# Patient Record
Sex: FEMALE | Race: WHITE | NOT HISPANIC OR LATINO | Employment: FULL TIME | URBAN - METROPOLITAN AREA
[De-identification: names, ages, dates, MRNs, and addresses within clinical notes are randomized per-mention and may not be internally consistent; named-entity substitution may affect disease eponyms.]

---

## 2017-01-18 ENCOUNTER — CONVERSION ENCOUNTER (OUTPATIENT)
Dept: FAMILY MEDICINE CLINIC | Facility: CLINIC | Age: 42
End: 2017-01-18

## 2017-06-16 ENCOUNTER — CONVERSION ENCOUNTER (OUTPATIENT)
Dept: FAMILY MEDICINE CLINIC | Facility: CLINIC | Age: 42
End: 2017-06-16

## 2017-07-18 ENCOUNTER — HOSPITAL ENCOUNTER (OUTPATIENT)
Dept: CARDIOLOGY | Facility: HOSPITAL | Age: 42
Discharge: HOME | End: 2017-07-18
Attending: FAMILY MEDICINE | Admitting: FAMILY MEDICINE

## 2018-03-22 ENCOUNTER — CONVERSION ENCOUNTER (OUTPATIENT)
Dept: FAMILY MEDICINE CLINIC | Facility: CLINIC | Age: 43
End: 2018-03-22

## 2018-03-30 ENCOUNTER — HOSPITAL ENCOUNTER (OUTPATIENT)
Dept: MAMMOGRAPHY | Facility: HOSPITAL | Age: 43
Discharge: HOME | End: 2018-03-30
Attending: FAMILY MEDICINE | Admitting: FAMILY MEDICINE

## 2018-04-05 ENCOUNTER — CONVERSION ENCOUNTER (OUTPATIENT)
Dept: FAMILY MEDICINE CLINIC | Facility: CLINIC | Age: 43
End: 2018-04-05

## 2018-04-06 ENCOUNTER — HOSPITAL ENCOUNTER (OUTPATIENT)
Dept: FAMILY MEDICINE CLINIC | Facility: CLINIC | Age: 43
Setting detail: SPECIMEN
Discharge: HOME | End: 2018-04-06
Attending: FAMILY MEDICINE | Admitting: FAMILY MEDICINE

## 2018-06-06 ENCOUNTER — HOSPITAL ENCOUNTER (OUTPATIENT)
Dept: LAB | Facility: HOSPITAL | Age: 43
Discharge: HOME | End: 2018-06-06
Attending: FAMILY MEDICINE | Admitting: FAMILY MEDICINE

## 2018-06-06 ENCOUNTER — CONVERSION ENCOUNTER (OUTPATIENT)
Dept: FAMILY MEDICINE CLINIC | Facility: CLINIC | Age: 43
End: 2018-06-06

## 2018-06-06 LAB — D DIMER PPP FEU-MCNC: 0.25 MG/L FEU (ref 0.17–0.59)

## 2019-02-06 ENCOUNTER — CONVERSION ENCOUNTER (OUTPATIENT)
Dept: FAMILY MEDICINE CLINIC | Facility: CLINIC | Age: 44
End: 2019-02-06

## 2019-06-03 VITALS
OXYGEN SATURATION: 97 % | DIASTOLIC BLOOD PRESSURE: 82 MMHG | BODY MASS INDEX: 28.88 KG/M2 | DIASTOLIC BLOOD PRESSURE: 79 MMHG | DIASTOLIC BLOOD PRESSURE: 87 MMHG | HEART RATE: 86 BPM | WEIGHT: 160 LBS | SYSTOLIC BLOOD PRESSURE: 125 MMHG | HEART RATE: 69 BPM | OXYGEN SATURATION: 100 % | WEIGHT: 159 LBS | HEIGHT: 63 IN | HEART RATE: 68 BPM | OXYGEN SATURATION: 100 % | WEIGHT: 165 LBS | WEIGHT: 162.13 LBS | DIASTOLIC BLOOD PRESSURE: 76 MMHG | HEART RATE: 77 BPM | BODY MASS INDEX: 28.35 KG/M2 | HEIGHT: 63 IN | HEIGHT: 63 IN | BODY MASS INDEX: 29.23 KG/M2 | DIASTOLIC BLOOD PRESSURE: 78 MMHG | OXYGEN SATURATION: 100 % | DIASTOLIC BLOOD PRESSURE: 91 MMHG | SYSTOLIC BLOOD PRESSURE: 130 MMHG | HEART RATE: 70 BPM | SYSTOLIC BLOOD PRESSURE: 123 MMHG | OXYGEN SATURATION: 99 % | SYSTOLIC BLOOD PRESSURE: 145 MMHG | SYSTOLIC BLOOD PRESSURE: 112 MMHG | WEIGHT: 163 LBS | SYSTOLIC BLOOD PRESSURE: 149 MMHG | HEIGHT: 63 IN | BODY MASS INDEX: 29.41 KG/M2 | WEIGHT: 166 LBS | HEART RATE: 93 BPM | OXYGEN SATURATION: 97 %

## 2019-08-14 ENCOUNTER — OFFICE VISIT (OUTPATIENT)
Dept: FAMILY MEDICINE CLINIC | Facility: CLINIC | Age: 44
End: 2019-08-14

## 2019-08-14 VITALS
SYSTOLIC BLOOD PRESSURE: 159 MMHG | HEIGHT: 63 IN | BODY MASS INDEX: 28.17 KG/M2 | OXYGEN SATURATION: 99 % | WEIGHT: 159 LBS | DIASTOLIC BLOOD PRESSURE: 112 MMHG | HEART RATE: 70 BPM

## 2019-08-14 DIAGNOSIS — R12 HEARTBURN: ICD-10-CM

## 2019-08-14 DIAGNOSIS — IMO0002 MASS: Primary | ICD-10-CM

## 2019-08-14 DIAGNOSIS — R07.9 CHEST PAIN, UNSPECIFIED TYPE: ICD-10-CM

## 2019-08-14 DIAGNOSIS — R21 RASH: ICD-10-CM

## 2019-08-14 PROBLEM — R87.613 PAPANICOLAOU SMEAR OF CERVIX WITH HIGH GRADE SQUAMOUS INTRAEPITHELIAL LESION (HGSIL): Status: ACTIVE | Noted: 2018-04-16

## 2019-08-14 PROBLEM — R60.0 EDEMA LEG: Status: ACTIVE | Noted: 2018-06-06

## 2019-08-14 PROBLEM — R42 DIZZINESS: Status: ACTIVE | Noted: 2018-03-22

## 2019-08-14 PROBLEM — M62.81 MUSCLE WEAKNESS OF LOWER EXTREMITY: Status: ACTIVE | Noted: 2017-06-16

## 2019-08-14 PROBLEM — N39.46 URINARY INCONTINENCE, MIXED: Status: ACTIVE | Noted: 2017-06-16

## 2019-08-14 PROBLEM — Z72.0 TOBACCO USE: Status: ACTIVE | Noted: 2017-06-16

## 2019-08-14 PROBLEM — R06.09 DYSPNEA ON EXERTION: Status: ACTIVE | Noted: 2017-06-16

## 2019-08-14 PROCEDURE — 99214 OFFICE O/P EST MOD 30 MIN: CPT | Performed by: NURSE PRACTITIONER

## 2019-08-14 RX ORDER — SULFAMETHOXAZOLE AND TRIMETHOPRIM 800; 160 MG/1; MG/1
1 TABLET ORAL 2 TIMES DAILY
Qty: 20 TABLET | Refills: 0 | Status: SHIPPED | OUTPATIENT
Start: 2019-08-14 | End: 2019-08-24

## 2019-08-14 RX ORDER — OMEPRAZOLE 40 MG/1
40 CAPSULE, DELAYED RELEASE ORAL DAILY
Qty: 30 CAPSULE | Refills: 0 | Status: SHIPPED | OUTPATIENT
Start: 2019-08-14 | End: 2019-09-08 | Stop reason: SDUPTHER

## 2019-08-14 NOTE — PATIENT INSTRUCTIONS
Finish antibiotic  Call if cyst does not go down   Apply OTC hydrocortisone cream to hands for rash  Complete echo  Start omeprazole  Avoid overeating, eat small portions at meals and snacks. Avoid chewing gum, chocolate, caffeine, spicy foods, alcohol, coffee, peppermint, and acidic foods.  Elevate head of bed. Avoid eating within 2 hours of bedtime.  Sit up for 30 minutes after eating meals.

## 2019-08-14 NOTE — PROGRESS NOTES
"Subjective   Eleanor Bonds is a 43 y.o. female.     Pt is here today with c/o of a nodule on her left back and rash on both hands that is itching. The nodule appeared about 1 month ago.  It started small and she initially thought it was a pimple.  It has since increased and is causing pain.  An pressure or movement affects it.  She does not recall any insect bites in that area. Denies any fever, chills, fatigue.  She also has has some itchy bumps on her hand for the last couple of days.  She had been working in the yard.  Patient also reports that she has been having increased chest pain at times.  She is also experiencing burning that is going up into her throat.  She has never been told that she has acid reflux in the past.  She does have a history of cardiomyopathy and states that she typically has an echo done completed yearly but has not had one in a couple of years.         The following portions of the patient's history were reviewed and updated as appropriate: allergies, current medications, past family history, past medical history, past social history, past surgical history and problem list.    Review of Systems   Constitutional: Negative for chills, fatigue and fever.   Eyes: Negative for blurred vision and double vision.   Respiratory: Negative for chest tightness and shortness of breath.    Cardiovascular: Positive for chest pain. Negative for palpitations.   Gastrointestinal: Positive for indigestion.   Musculoskeletal: Negative for myalgias.   Skin: Positive for rash and skin lesions.   Neurological: Negative for dizziness and headache.   Psychiatric/Behavioral: Negative for depressed mood.       Objective   BP (!) 159/112 (BP Location: Right arm, Patient Position: Sitting, Cuff Size: Adult)   Pulse 70   Ht 160 cm (63\")   Wt 72.1 kg (159 lb)   SpO2 99%   BMI 28.17 kg/m²   Physical Exam   Constitutional: She is oriented to person, place, and time. She appears well-developed and well-nourished. "   HENT:   Head: Normocephalic and atraumatic.   Neck: Normal range of motion. Neck supple.   Cardiovascular: Normal rate and regular rhythm.   Pulmonary/Chest: Effort normal and breath sounds normal.   Abdominal: Soft. Bowel sounds are normal.   Neurological: She is oriented to person, place, and time.   Skin: Skin is warm and dry. There is erythema.   3 to 4 cm Erithmatic mass on the left upper back  Small papule rash on fingers   Psychiatric: She has a normal mood and affect. Her behavior is normal.         Assessment/Plan   Problems Addressed this Visit     None      Visit Diagnoses     Mass    -  Primary    Appears to be an infected sebaceous cyst  We will treat with antibiotic  Call if symptoms do not improve    Relevant Medications    sulfamethoxazole-trimethoprim (BACTRIM DS) 800-160 MG per tablet    Rash        Papular rash of unknown origin  Use hydrocortisone cream  Call if symptoms not improving    Heartburn        Start omeprazole  GERD precautions given      Relevant Medications    omeprazole (priLOSEC) 40 MG capsule    Chest pain, unspecified type        Obtain echo due to history of cardiomyopathy    Relevant Orders    Adult Transthoracic Echo Complete W/ Cont if Necessary Per Protocol              Diagnoses and all orders for this visit:    1. Mass (Primary)  Comments:  Appears to be an infected sebaceous cyst  We will treat with antibiotic  Call if symptoms do not improve  Orders:  -     sulfamethoxazole-trimethoprim (BACTRIM DS) 800-160 MG per tablet; Take 1 tablet by mouth 2 (Two) Times a Day for 10 days.  Dispense: 20 tablet; Refill: 0    2. Rash  Comments:  Papular rash of unknown origin  Use hydrocortisone cream  Call if symptoms not improving    3. Heartburn  Comments:  Start omeprazole  GERD precautions given    Orders:  -     omeprazole (priLOSEC) 40 MG capsule; Take 1 capsule by mouth Daily.  Dispense: 30 capsule; Refill: 0    4. Chest pain, unspecified type  Comments:  Obtain echo due to  history of cardiomyopathy  Orders:  -     Adult Transthoracic Echo Complete W/ Cont if Necessary Per Protocol; Future

## 2019-08-23 ENCOUNTER — OFFICE VISIT (OUTPATIENT)
Dept: FAMILY MEDICINE CLINIC | Facility: CLINIC | Age: 44
End: 2019-08-23

## 2019-08-23 VITALS
TEMPERATURE: 97.9 F | BODY MASS INDEX: 29.59 KG/M2 | SYSTOLIC BLOOD PRESSURE: 133 MMHG | DIASTOLIC BLOOD PRESSURE: 94 MMHG | OXYGEN SATURATION: 99 % | WEIGHT: 167 LBS | HEART RATE: 74 BPM | HEIGHT: 63 IN

## 2019-08-23 DIAGNOSIS — IMO0002 MASS: Primary | ICD-10-CM

## 2019-08-23 PROCEDURE — 99213 OFFICE O/P EST LOW 20 MIN: CPT | Performed by: NURSE PRACTITIONER

## 2019-08-23 PROCEDURE — 87205 SMEAR GRAM STAIN: CPT | Performed by: NURSE PRACTITIONER

## 2019-08-23 PROCEDURE — 87070 CULTURE OTHR SPECIMN AEROBIC: CPT | Performed by: NURSE PRACTITIONER

## 2019-08-23 RX ORDER — TRAMADOL HYDROCHLORIDE 50 MG/1
50 TABLET ORAL EVERY 6 HOURS PRN
Qty: 10 TABLET | Refills: 0 | Status: SHIPPED | OUTPATIENT
Start: 2019-08-23 | End: 2019-08-24

## 2019-08-23 RX ORDER — SULFAMETHOXAZOLE AND TRIMETHOPRIM 800; 160 MG/1; MG/1
1 TABLET ORAL 2 TIMES DAILY
Qty: 20 TABLET | Refills: 0 | Status: SHIPPED | OUTPATIENT
Start: 2019-08-23 | End: 2019-08-29

## 2019-08-23 NOTE — PATIENT INSTRUCTIONS
Keep area clean and dry  Pull 1/2 inch of packing out each day.  Follow up early next week for reevaluation  Watch for signs of infection- increased pain, redness, swelling, fever  Use a clear tegaderm dressing when showering  Complete antibiotic  Tramadol as needed for pain

## 2019-08-23 NOTE — PROGRESS NOTES
"Subjective   Eleanor Bonds is a 43 y.o. female.     Pt is here today with a mass on her left upper back that appears to be an infected sebaceous cyst or boil.  She was seen last week and was treated with bactrim.  She has her last dose today.  She reports that it is getting more inflamed and more painful. She has not ran any fevers or had body aches.  Denies any itching          The following portions of the patient's history were reviewed and updated as appropriate: allergies, current medications, past family history, past medical history, past social history, past surgical history and problem list.    Review of Systems   Constitutional: Negative for chills and fever.   Respiratory: Negative for chest tightness and shortness of breath.    Cardiovascular: Positive for chest pain.   Skin: Positive for skin lesions.       Objective   /94 (BP Location: Right arm, Patient Position: Sitting, Cuff Size: Large Adult)   Pulse 74   Temp 97.9 °F (36.6 °C) (Oral)   Ht 160 cm (63\")   Wt 75.8 kg (167 lb)   SpO2 99%   BMI 29.58 kg/m²   Physical Exam   Constitutional: She is oriented to person, place, and time. She appears well-developed and well-nourished.   Cardiovascular: Normal rate and regular rhythm.   Pulmonary/Chest: Effort normal and breath sounds normal.   Musculoskeletal: Normal range of motion.   Neurological: She is alert and oriented to person, place, and time.   Skin: There is erythema.   Erythematic 3-4cm mass on left upper back.   Psychiatric: She has a normal mood and affect. Her behavior is normal. Judgment and thought content normal.         Assessment/Plan   Problems Addressed this Visit     None      Visit Diagnoses     Mass    -  Primary    infected sebaceous cyst vs boil  I&D and wound culture  purulent drainage.  packed with iodoform.  Return on Monday for reeval  10 more days bactrim    Relevant Medications    traMADol (ULTRAM) 50 MG tablet    sulfamethoxazole-trimethoprim (BACTRIM DS) " 800-160 MG per tablet    Other Relevant Orders    Wound Culture - Wound, Back, Upper (Completed)              Diagnoses and all orders for this visit:    1. Mass (Primary)  Comments:  infected sebaceous cyst vs boil  I&D and wound culture  purulent drainage.  packed with iodoform.  Return on Monday for reeval  10 more days bactrim  Orders:  -     traMADol (ULTRAM) 50 MG tablet; Take 1 tablet by mouth Every 6 (Six) Hours As Needed for Moderate Pain .  Dispense: 10 tablet; Refill: 0  -     sulfamethoxazole-trimethoprim (BACTRIM DS) 800-160 MG per tablet; Take 1 tablet by mouth 2 (Two) Times a Day for 10 days.  Dispense: 20 tablet; Refill: 0  -     Wound Culture - Wound, Back, Upper; Future  -     Wound Culture - Wound, Back, Upper      Incision & Drainage  Date/Time: 8/24/2019 9:06 AM  Performed by: Lizz Moraes APRN  Authorized by: Lizz Moraes APRN   Body area: trunk  Location details: back  Anesthesia: local infiltration    Anesthesia:  Local Anesthetic: lidocaine 1% with epinephrine    Sedation:  Patient sedated: no    Scalpel size: 15  Needle gauge: 22  Incision type: single straight  Drainage: purulent and  bloody  Wound treatment: wound left open  Packing material: 1/2 in iodoform gauze  Patient tolerance: Patient tolerated the procedure well with no immediate complications        Keep area clean and dry  Pull 1/2 inch of packing out each day.  Follow up early next week for reevaluation  Watch for signs of infection- increased pain, redness, swelling, fever  Use a clear tegaderm dressing when showering  Complete antibiotic  Tramadol as needed for pain

## 2019-08-24 RX ORDER — TRAMADOL HYDROCHLORIDE 50 MG/1
50 TABLET ORAL EVERY 6 HOURS PRN
Qty: 10 TABLET | Refills: 0 | Status: SHIPPED | OUTPATIENT
Start: 2019-08-24 | End: 2020-01-29

## 2019-08-27 ENCOUNTER — OFFICE VISIT (OUTPATIENT)
Dept: FAMILY MEDICINE CLINIC | Facility: CLINIC | Age: 44
End: 2019-08-27

## 2019-08-27 VITALS
HEART RATE: 72 BPM | TEMPERATURE: 98 F | BODY MASS INDEX: 29.59 KG/M2 | HEIGHT: 63 IN | SYSTOLIC BLOOD PRESSURE: 114 MMHG | DIASTOLIC BLOOD PRESSURE: 78 MMHG | OXYGEN SATURATION: 98 % | WEIGHT: 167 LBS

## 2019-08-27 DIAGNOSIS — IMO0002 MASS: Primary | ICD-10-CM

## 2019-08-27 LAB
BACTERIA SPEC AEROBE CULT: ABNORMAL
GRAM STN SPEC: ABNORMAL
GRAM STN SPEC: ABNORMAL

## 2019-08-27 PROCEDURE — 99212 OFFICE O/P EST SF 10 MIN: CPT | Performed by: NURSE PRACTITIONER

## 2019-08-27 NOTE — PROGRESS NOTES
"Subjective   Eleanor Bonds is a 43 y.o. female.     Patient is here today for reevaluation of her I&D that was done late last week to a mass on her left upper back.  Packing was to be removed and dressing changed.  Patient denies any fevers chills.  She has been changing the dressing every day.  No complaints at this time other than the incision itching some.         The following portions of the patient's history were reviewed and updated as appropriate: allergies, current medications, past family history, past medical history, past social history, past surgical history and problem list.    Review of Systems   Constitutional: Negative for chills, fatigue and fever.   Respiratory: Negative for cough.    Gastrointestinal: Negative for abdominal pain.   Skin: Positive for skin lesions.   Neurological: Negative for dizziness and headache.       Objective   /78 (BP Location: Right arm, Patient Position: Sitting, Cuff Size: Large Adult)   Pulse 72   Temp 98 °F (36.7 °C) (Oral)   Ht 160 cm (63\")   Wt 75.8 kg (167 lb)   SpO2 98%   BMI 29.58 kg/m²   Physical Exam   Constitutional: She is oriented to person, place, and time. She appears well-developed and well-nourished. No distress.   Cardiovascular: Normal rate and regular rhythm.   Pulmonary/Chest: Effort normal and breath sounds normal. No respiratory distress.   Neurological: She is alert and oriented to person, place, and time.   Skin:        Packing was removed from the left upper back I&D site.  Purulent drainage noted on packing.  Surrounding erythema resolved.  Wound seems to be healing well.  Reapplied packing and coverderm.  Advised patient to come back later this week for dressing change and reevaluation.   Psychiatric: She has a normal mood and affect. Her behavior is normal. Judgment and thought content normal.         Assessment/Plan   Problems Addressed this Visit     None      Visit Diagnoses     Mass    -  Primary    Healing well  Packing " removed and repacked  No surrounding erythema  Continue to change external dressing daily  Come back later this week for packing removal                Diagnoses and all orders for this visit:    1. Mass (Primary)  Comments:  Healing well  Packing removed and repacked  No surrounding erythema  Continue to change external dressing daily  Come back later this week for packing removal        Area cleaned with Betadine.  Wound packed with half-inch iodoform.  Cover Derm applied over wound

## 2019-08-29 ENCOUNTER — OFFICE VISIT (OUTPATIENT)
Dept: FAMILY MEDICINE CLINIC | Facility: CLINIC | Age: 44
End: 2019-08-29

## 2019-08-29 VITALS
OXYGEN SATURATION: 100 % | SYSTOLIC BLOOD PRESSURE: 137 MMHG | WEIGHT: 176 LBS | HEIGHT: 63 IN | HEART RATE: 88 BPM | BODY MASS INDEX: 31.18 KG/M2 | DIASTOLIC BLOOD PRESSURE: 82 MMHG

## 2019-08-29 DIAGNOSIS — IMO0002 MASS: Primary | ICD-10-CM

## 2019-08-29 PROCEDURE — 99212 OFFICE O/P EST SF 10 MIN: CPT | Performed by: NURSE PRACTITIONER

## 2019-08-29 NOTE — PROGRESS NOTES
"Subjective   Eleanor Bonds is a 43 y.o. female.     Pt is here today for her second follow up on a boil that had been lanced and packed.  Pt's wound culture came back indicating normal bacteria so she was advised to stop taking her antibiotic.  She is doing well and denies any fever or chills. She reports that the wound has been itchy.  She has been changing her dressing daily.  Denies any surrounding redness    Pt reports that her heartburn is doing better since she started taking omeprazole.  She is having a echo completed next week to monitor her cardiomyopathy.         The following portions of the patient's history were reviewed and updated as appropriate: allergies, current medications, past family history, past medical history, past social history, past surgical history and problem list.    Review of Systems   Constitutional: Negative for chills, fatigue and fever.   Respiratory: Positive for shortness of breath (with exertion). Negative for chest tightness.    Cardiovascular: Negative for chest pain and palpitations.   Gastrointestinal: Negative for diarrhea, nausea and vomiting.   Musculoskeletal: Negative for myalgias.   Neurological: Negative for dizziness and headache.       Objective   /82 (BP Location: Left arm, Patient Position: Sitting, Cuff Size: Adult)   Pulse 88   Ht 160 cm (63\")   Wt 79.8 kg (176 lb)   SpO2 100%   BMI 31.18 kg/m²   Physical Exam   Constitutional: She is oriented to person, place, and time. She appears well-developed and well-nourished. No distress.   Cardiovascular: Normal rate and regular rhythm.   Pulmonary/Chest: Effort normal and breath sounds normal.   Abdominal: Soft.   Neurological: She is alert and oriented to person, place, and time.   Skin: No erythema.        Packing was removed from the left upper back I&D site.  Purulent drainage noted on packing.  No surrounding erythema.  Wound seems to be healing well. Flushed with normal saline  Reapplied packing " and coverderm.  Advised patient to remove packing on Sunday and to cont to place guaze over site.     Psychiatric: She has a normal mood and affect. Her behavior is normal. Judgment and thought content normal.         Assessment/Plan   Problems Addressed this Visit     None      Visit Diagnoses     Mass    -  Primary    healing well  packing removed  cleaned with normal saline  reaaplied 1/2 iodoform guaze  pt to remove on sunday morn  apply dry dressing  call for signs of inf              Diagnoses and all orders for this visit:    1. Mass (Primary)  Comments:  healing well  packing removed  cleaned with normal saline  reaaplied 1/2 iodoform guaze  pt to remove on sunday morn  apply dry dressing  call for signs of inf

## 2019-08-29 NOTE — PATIENT INSTRUCTIONS
Ok to cancel appointment next week  Remove packing on Sunday  Apply dry dressing daily  Call for any signs of infection- redness, purulent drainage, fever

## 2019-09-04 ENCOUNTER — HOSPITAL ENCOUNTER (OUTPATIENT)
Dept: CARDIOLOGY | Facility: HOSPITAL | Age: 44
Discharge: HOME | End: 2019-09-04
Admitting: NURSE PRACTITIONER

## 2019-09-04 VITALS
HEIGHT: 63 IN | BODY MASS INDEX: 31.36 KG/M2 | SYSTOLIC BLOOD PRESSURE: 146 MMHG | DIASTOLIC BLOOD PRESSURE: 84 MMHG | WEIGHT: 177 LBS

## 2019-09-04 DIAGNOSIS — R07.9 CHEST PAIN, UNSPECIFIED TYPE: ICD-10-CM

## 2019-09-04 LAB
BH CV ECHO MEAS - % IVS THICK: 53.3 %
BH CV ECHO MEAS - % LVPW THICK: 61 %
BH CV ECHO MEAS - ACS: 2 CM
BH CV ECHO MEAS - AO MAX PG (FULL): 2.6 MMHG
BH CV ECHO MEAS - AO MAX PG: 5.9 MMHG
BH CV ECHO MEAS - AO MEAN PG (FULL): 0.99 MMHG
BH CV ECHO MEAS - AO MEAN PG: 2.8 MMHG
BH CV ECHO MEAS - AO ROOT AREA (BSA CORRECTED): 1.7
BH CV ECHO MEAS - AO ROOT AREA: 7.4 CM^2
BH CV ECHO MEAS - AO ROOT DIAM: 3.1 CM
BH CV ECHO MEAS - AO V2 MAX: 121.5 CM/SEC
BH CV ECHO MEAS - AO V2 MEAN: 77.9 CM/SEC
BH CV ECHO MEAS - AO V2 VTI: 24 CM
BH CV ECHO MEAS - ASC AORTA: 2.6 CM
BH CV ECHO MEAS - AVA(I,A): 2.5 CM^2
BH CV ECHO MEAS - AVA(I,D): 2.5 CM^2
BH CV ECHO MEAS - AVA(V,A): 2.1 CM^2
BH CV ECHO MEAS - AVA(V,D): 2.1 CM^2
BH CV ECHO MEAS - BSA(HAYCOCK): 1.9 M^2
BH CV ECHO MEAS - BSA: 1.8 M^2
BH CV ECHO MEAS - BZI_BMI: 31.4 KILOGRAMS/M^2
BH CV ECHO MEAS - BZI_METRIC_HEIGHT: 160 CM
BH CV ECHO MEAS - BZI_METRIC_WEIGHT: 80.3 KG
BH CV ECHO MEAS - EDV(CUBED): 202.6 ML
BH CV ECHO MEAS - EDV(MOD-SP2): 71 ML
BH CV ECHO MEAS - EDV(MOD-SP4): 90.3 ML
BH CV ECHO MEAS - EDV(TEICH): 171.4 ML
BH CV ECHO MEAS - EF(CUBED): 67.4 %
BH CV ECHO MEAS - EF(MOD-BP): 55 %
BH CV ECHO MEAS - EF(MOD-SP2): 48.8 %
BH CV ECHO MEAS - EF(MOD-SP4): 61.9 %
BH CV ECHO MEAS - EF(TEICH): 58.1 %
BH CV ECHO MEAS - ESV(CUBED): 66.1 ML
BH CV ECHO MEAS - ESV(MOD-SP2): 36.3 ML
BH CV ECHO MEAS - ESV(MOD-SP4): 34.4 ML
BH CV ECHO MEAS - ESV(TEICH): 71.8 ML
BH CV ECHO MEAS - FS: 31.1 %
BH CV ECHO MEAS - IVS/LVPW: 0.96
BH CV ECHO MEAS - IVSD: 0.83 CM
BH CV ECHO MEAS - IVSS: 1.3 CM
BH CV ECHO MEAS - LA DIMENSION(2D): 4 CM
BH CV ECHO MEAS - LV DIASTOLIC VOL/BSA (35-75): 49.2 ML/M^2
BH CV ECHO MEAS - LV MASS(C)D: 192.1 GRAMS
BH CV ECHO MEAS - LV MASS(C)DI: 104.6 GRAMS/M^2
BH CV ECHO MEAS - LV MASS(C)S: 196.1 GRAMS
BH CV ECHO MEAS - LV MASS(C)SI: 106.8 GRAMS/M^2
BH CV ECHO MEAS - LV MAX PG: 3.3 MMHG
BH CV ECHO MEAS - LV MEAN PG: 1.8 MMHG
BH CV ECHO MEAS - LV SYSTOLIC VOL/BSA (12-30): 18.7 ML/M^2
BH CV ECHO MEAS - LV V1 MAX: 90.6 CM/SEC
BH CV ECHO MEAS - LV V1 MEAN: 64.6 CM/SEC
BH CV ECHO MEAS - LV V1 VTI: 21.2 CM
BH CV ECHO MEAS - LVIDD: 5.9 CM
BH CV ECHO MEAS - LVIDS: 4 CM
BH CV ECHO MEAS - LVOT AREA: 2.8 CM^2
BH CV ECHO MEAS - LVOT DIAM: 1.9 CM
BH CV ECHO MEAS - LVPWD: 0.86 CM
BH CV ECHO MEAS - LVPWS: 1.4 CM
BH CV ECHO MEAS - MV A MAX VEL: 55.6 CM/SEC
BH CV ECHO MEAS - MV DEC SLOPE: 500.8 CM/SEC^2
BH CV ECHO MEAS - MV DEC TIME: 0.15 SEC
BH CV ECHO MEAS - MV E MAX VEL: 72.9 CM/SEC
BH CV ECHO MEAS - MV E/A: 1.3
BH CV ECHO MEAS - MV MAX PG: 3.5 MMHG
BH CV ECHO MEAS - MV MEAN PG: 1.2 MMHG
BH CV ECHO MEAS - MV V2 MAX: 94.2 CM/SEC
BH CV ECHO MEAS - MV V2 MEAN: 50.1 CM/SEC
BH CV ECHO MEAS - MV V2 VTI: 28.6 CM
BH CV ECHO MEAS - MVA(VTI): 2.1 CM^2
BH CV ECHO MEAS - PA ACC TIME: 0.11 SEC
BH CV ECHO MEAS - PA MAX PG (FULL): 1.1 MMHG
BH CV ECHO MEAS - PA MAX PG: 2.9 MMHG
BH CV ECHO MEAS - PA MEAN PG (FULL): 0.88 MMHG
BH CV ECHO MEAS - PA MEAN PG: 1.7 MMHG
BH CV ECHO MEAS - PA PR(ACCEL): 29.2 MMHG
BH CV ECHO MEAS - PA V2 MAX: 85.3 CM/SEC
BH CV ECHO MEAS - PA V2 MEAN: 61.5 CM/SEC
BH CV ECHO MEAS - PA V2 VTI: 21.1 CM
BH CV ECHO MEAS - PI END-D VEL: 108 CM/SEC
BH CV ECHO MEAS - PI MAX PG: 8.3 MMHG
BH CV ECHO MEAS - PI MAX VEL: 144.2 CM/SEC
BH CV ECHO MEAS - PULM A REVS DUR: 0.09 SEC
BH CV ECHO MEAS - PULM A REVS VEL: 28.8 CM/SEC
BH CV ECHO MEAS - PULM DIAS VEL: 46.7 CM/SEC
BH CV ECHO MEAS - PULM S/D: 0.77
BH CV ECHO MEAS - PULM SYS VEL: 35.9 CM/SEC
BH CV ECHO MEAS - PVA(I,A): 3.4 CM^2
BH CV ECHO MEAS - PVA(I,D): 3.4 CM^2
BH CV ECHO MEAS - PVA(V,A): 3.6 CM^2
BH CV ECHO MEAS - PVA(V,D): 3.6 CM^2
BH CV ECHO MEAS - QP/QS: 1.2
BH CV ECHO MEAS - RAP SYSTOLE: 8 MMHG
BH CV ECHO MEAS - RV MAX PG: 1.8 MMHG
BH CV ECHO MEAS - RV MEAN PG: 0.79 MMHG
BH CV ECHO MEAS - RV V1 MAX: 67.8 CM/SEC
BH CV ECHO MEAS - RV V1 MEAN: 40.8 CM/SEC
BH CV ECHO MEAS - RV V1 VTI: 15.8 CM
BH CV ECHO MEAS - RVDD: 2.6 CM
BH CV ECHO MEAS - RVOT AREA: 4.5 CM^2
BH CV ECHO MEAS - RVOT DIAM: 2.4 CM
BH CV ECHO MEAS - RVSP: 31.8 MMHG
BH CV ECHO MEAS - SI(AO): 96.5 ML/M^2
BH CV ECHO MEAS - SI(CUBED): 74.3 ML/M^2
BH CV ECHO MEAS - SI(LVOT): 32.4 ML/M^2
BH CV ECHO MEAS - SI(MOD-SP2): 18.9 ML/M^2
BH CV ECHO MEAS - SI(MOD-SP4): 30.5 ML/M^2
BH CV ECHO MEAS - SI(TEICH): 54.2 ML/M^2
BH CV ECHO MEAS - SV(AO): 177.2 ML
BH CV ECHO MEAS - SV(CUBED): 136.5 ML
BH CV ECHO MEAS - SV(LVOT): 59.5 ML
BH CV ECHO MEAS - SV(MOD-SP2): 34.7 ML
BH CV ECHO MEAS - SV(MOD-SP4): 55.9 ML
BH CV ECHO MEAS - SV(RVOT): 70.9 ML
BH CV ECHO MEAS - SV(TEICH): 99.6 ML
BH CV ECHO MEAS - TR MAX VEL: 243.7 CM/SEC

## 2019-09-04 PROCEDURE — 93306 TTE W/DOPPLER COMPLETE: CPT

## 2019-09-04 PROCEDURE — 93306 TTE W/DOPPLER COMPLETE: CPT | Performed by: INTERNAL MEDICINE

## 2019-09-08 DIAGNOSIS — R12 HEARTBURN: ICD-10-CM

## 2019-09-08 RX ORDER — OMEPRAZOLE 40 MG/1
40 CAPSULE, DELAYED RELEASE ORAL DAILY
Qty: 30 CAPSULE | Refills: 0 | Status: SHIPPED | OUTPATIENT
Start: 2019-09-08 | End: 2021-09-08

## 2020-01-29 ENCOUNTER — LAB (OUTPATIENT)
Dept: FAMILY MEDICINE CLINIC | Facility: CLINIC | Age: 45
End: 2020-01-29

## 2020-01-29 ENCOUNTER — OFFICE VISIT (OUTPATIENT)
Dept: FAMILY MEDICINE CLINIC | Facility: CLINIC | Age: 45
End: 2020-01-29

## 2020-01-29 VITALS
TEMPERATURE: 97.9 F | BODY MASS INDEX: 29.41 KG/M2 | RESPIRATION RATE: 16 BRPM | SYSTOLIC BLOOD PRESSURE: 151 MMHG | HEIGHT: 63 IN | HEART RATE: 71 BPM | WEIGHT: 166 LBS | DIASTOLIC BLOOD PRESSURE: 89 MMHG | OXYGEN SATURATION: 98 %

## 2020-01-29 DIAGNOSIS — R39.198 DIFFICULTY URINATING: ICD-10-CM

## 2020-01-29 DIAGNOSIS — M25.521 RIGHT ELBOW PAIN: Primary | ICD-10-CM

## 2020-01-29 LAB
ALBUMIN SERPL-MCNC: 4.4 G/DL (ref 3.5–5.2)
ALBUMIN/GLOB SERPL: 1.6 G/DL
ALP SERPL-CCNC: 74 U/L (ref 39–117)
ALT SERPL W P-5'-P-CCNC: 15 U/L (ref 1–33)
ANION GAP SERPL CALCULATED.3IONS-SCNC: 15.8 MMOL/L (ref 5–15)
AST SERPL-CCNC: 16 U/L (ref 1–32)
BASOPHILS # BLD AUTO: 0.05 10*3/MM3 (ref 0–0.2)
BASOPHILS NFR BLD AUTO: 0.4 % (ref 0–1.5)
BILIRUB BLD-MCNC: NEGATIVE MG/DL
BILIRUB SERPL-MCNC: 0.4 MG/DL (ref 0.2–1.2)
BUN BLD-MCNC: 8 MG/DL (ref 6–20)
BUN/CREAT SERPL: 12.3 (ref 7–25)
CALCIUM SPEC-SCNC: 8.8 MG/DL (ref 8.6–10.5)
CHLORIDE SERPL-SCNC: 98 MMOL/L (ref 98–107)
CHOLEST SERPL-MCNC: 166 MG/DL (ref 0–200)
CLARITY, POC: ABNORMAL
CO2 SERPL-SCNC: 24.2 MMOL/L (ref 22–29)
COLOR UR: ABNORMAL
CREAT BLD-MCNC: 0.65 MG/DL (ref 0.57–1)
DEPRECATED RDW RBC AUTO: 41.1 FL (ref 37–54)
EOSINOPHIL # BLD AUTO: 0.33 10*3/MM3 (ref 0–0.4)
EOSINOPHIL NFR BLD AUTO: 2.6 % (ref 0.3–6.2)
ERYTHROCYTE [DISTWIDTH] IN BLOOD BY AUTOMATED COUNT: 13.2 % (ref 12.3–15.4)
GFR SERPL CREATININE-BSD FRML MDRD: 99 ML/MIN/1.73
GLOBULIN UR ELPH-MCNC: 2.7 GM/DL
GLUCOSE BLD-MCNC: 102 MG/DL (ref 65–99)
GLUCOSE UR STRIP-MCNC: NEGATIVE MG/DL
HBA1C MFR BLD: 5 % (ref 3.5–5.6)
HCT VFR BLD AUTO: 42 % (ref 34–46.6)
HDLC SERPL-MCNC: 47 MG/DL (ref 40–60)
HGB BLD-MCNC: 13.7 G/DL (ref 12–15.9)
IMM GRANULOCYTES # BLD AUTO: 0.04 10*3/MM3 (ref 0–0.05)
IMM GRANULOCYTES NFR BLD AUTO: 0.3 % (ref 0–0.5)
KETONES UR QL: NEGATIVE
LDLC SERPL CALC-MCNC: 98 MG/DL (ref 0–100)
LDLC/HDLC SERPL: 2.09 {RATIO}
LEUKOCYTE EST, POC: ABNORMAL
LYMPHOCYTES # BLD AUTO: 2.57 10*3/MM3 (ref 0.7–3.1)
LYMPHOCYTES NFR BLD AUTO: 20.4 % (ref 19.6–45.3)
MCH RBC QN AUTO: 27.7 PG (ref 26.6–33)
MCHC RBC AUTO-ENTMCNC: 32.6 G/DL (ref 31.5–35.7)
MCV RBC AUTO: 85 FL (ref 79–97)
MONOCYTES # BLD AUTO: 0.67 10*3/MM3 (ref 0.1–0.9)
MONOCYTES NFR BLD AUTO: 5.3 % (ref 5–12)
NEUTROPHILS # BLD AUTO: 8.96 10*3/MM3 (ref 1.7–7)
NEUTROPHILS NFR BLD AUTO: 71 % (ref 42.7–76)
NITRITE UR-MCNC: NEGATIVE MG/ML
NRBC BLD AUTO-RTO: 0 /100 WBC (ref 0–0.2)
PH UR: 5.5 [PH] (ref 5–8)
PLATELET # BLD AUTO: 377 10*3/MM3 (ref 140–450)
PMV BLD AUTO: 11.7 FL (ref 6–12)
POTASSIUM BLD-SCNC: 3.7 MMOL/L (ref 3.5–5.2)
PROT SERPL-MCNC: 7.1 G/DL (ref 6–8.5)
PROT UR STRIP-MCNC: NEGATIVE MG/DL
RBC # BLD AUTO: 4.94 10*6/MM3 (ref 3.77–5.28)
RBC # UR STRIP: NEGATIVE /UL
SODIUM BLD-SCNC: 138 MMOL/L (ref 136–145)
SP GR UR: 1.03 (ref 1–1.03)
TRIGL SERPL-MCNC: 104 MG/DL (ref 0–150)
UROBILINOGEN UR QL: NORMAL
VLDLC SERPL-MCNC: 20.8 MG/DL (ref 5–40)
WBC NRBC COR # BLD: 12.62 10*3/MM3 (ref 3.4–10.8)

## 2020-01-29 PROCEDURE — 99214 OFFICE O/P EST MOD 30 MIN: CPT | Performed by: NURSE PRACTITIONER

## 2020-01-29 PROCEDURE — 85025 COMPLETE CBC W/AUTO DIFF WBC: CPT | Performed by: NURSE PRACTITIONER

## 2020-01-29 PROCEDURE — 83036 HEMOGLOBIN GLYCOSYLATED A1C: CPT | Performed by: NURSE PRACTITIONER

## 2020-01-29 PROCEDURE — 80061 LIPID PANEL: CPT | Performed by: NURSE PRACTITIONER

## 2020-01-29 PROCEDURE — 87086 URINE CULTURE/COLONY COUNT: CPT | Performed by: NURSE PRACTITIONER

## 2020-01-29 PROCEDURE — 80053 COMPREHEN METABOLIC PANEL: CPT | Performed by: NURSE PRACTITIONER

## 2020-01-29 PROCEDURE — 81003 URINALYSIS AUTO W/O SCOPE: CPT | Performed by: NURSE PRACTITIONER

## 2020-01-29 PROCEDURE — 36415 COLL VENOUS BLD VENIPUNCTURE: CPT

## 2020-01-29 RX ORDER — MELOXICAM 15 MG/1
15 TABLET ORAL DAILY
Qty: 30 TABLET | Refills: 0 | Status: SHIPPED | OUTPATIENT
Start: 2020-01-29 | End: 2020-02-18 | Stop reason: SDUPTHER

## 2020-01-29 NOTE — PROGRESS NOTES
"Subjective   Eleanor Bonds is a 44 y.o. female.     Pt is here today with c/o right elbow pain and difficulty with urination.  Reports that pain started 3 weeks ago.  Denies any known injury, but states that she repetitively lifts heavy objects at work.  She woke up one morning with it hurting.  She has tried applying a support brace and has tried taking ibuprofen without relief.  The lateral side of the elbow is tender to touch.  Has some swelling while she is working.  She has difficulty gripping objects and experiences pain with straightening of arm.  Pt reports that she has been having difficulties with urination.  She states that it is hard to get her urine started at times.  She does not have any dysuria at this time.  Denies fever or chills.  She is concerned something could be off with her kidneys.  She has not had blood work in a long time.  Patient reports that she consumes a large amount of sodas and sugary drinks.       The following portions of the patient's history were reviewed and updated as appropriate: allergies, current medications, past family history, past medical history, past social history, past surgical history and problem list.    Review of Systems   Constitutional: Negative for chills, fatigue and fever.   Respiratory: Negative for chest tightness and shortness of breath.    Cardiovascular: Negative for chest pain and palpitations.   Genitourinary: Positive for difficulty urinating. Negative for dysuria and urgency.   Musculoskeletal: Positive for joint swelling.   Neurological: Positive for headache. Negative for dizziness.       Objective   /89 (BP Location: Left arm, Patient Position: Sitting, Cuff Size: Large Adult)   Pulse 71   Temp 97.9 °F (36.6 °C) (Oral)   Resp 16   Ht 160 cm (63\")   Wt 75.3 kg (166 lb)   LMP 01/03/2020   SpO2 98%   Breastfeeding No   BMI 29.41 kg/m²   Physical Exam   Constitutional: She is oriented to person, place, and time. She appears " well-developed and well-nourished. No distress.   HENT:   Head: Normocephalic and atraumatic.   Cardiovascular: Normal rate, regular rhythm and normal heart sounds.   Pulmonary/Chest: Effort normal and breath sounds normal. No respiratory distress. She has no wheezes.   Abdominal: Soft. Bowel sounds are normal. There is no tenderness.   Musculoskeletal: Normal range of motion. She exhibits tenderness (lateral right elbow). She exhibits no edema.   Neurological: She is alert and oriented to person, place, and time.   Skin: Skin is warm and dry.   Psychiatric: She has a normal mood and affect. Her behavior is normal. Judgment and thought content normal.         Assessment/Plan     Diagnoses and all orders for this visit:    1. Right elbow pain (Primary)  Comments:  likely lateral epicondylitis  mobic- no other NSAIDs  get brace for elbow  work restrictions x1wk  if no imp, send to sports med    Orders:  -     meloxicam (MOBIC) 15 MG tablet; Take 1 tablet by mouth Daily.  Dispense: 30 tablet; Refill: 0    2. Difficulty urinating  Comments:  UA- trace leuks  send for culture  check labs  push water intake  Orders:  -     CBC & Differential  -     Comprehensive Metabolic Panel; Future  -     Lipid Panel; Future  -     POC Urinalysis Dipstick, Automated  -     Hemoglobin A1c; Future  -     Urine Culture - Urine, Urine, Clean Catch; Future  -     Urine Culture - Urine, Urine, Clean Catch        Brief Urine Lab Results  (Last result in the past 365 days)      Color   Clarity   Blood   Leuk Est   Nitrite   Protein   CREAT   Urine HCG        01/29/20 1020 Dark Yellow Cloudy Negative Trace Negative Negative

## 2020-01-29 NOTE — PATIENT INSTRUCTIONS
Start taking meloxicam daily-avoid other NSAIDs  Perform home exercises  Avoid heavy lifting-work restrictions until January 10  Wear brace  Obtain labs

## 2020-01-30 LAB — BACTERIA SPEC AEROBE CULT: NORMAL

## 2020-02-06 ENCOUNTER — TELEPHONE (OUTPATIENT)
Dept: FAMILY MEDICINE CLINIC | Facility: CLINIC | Age: 45
End: 2020-02-06

## 2020-02-06 ENCOUNTER — CLINICAL SUPPORT (OUTPATIENT)
Dept: FAMILY MEDICINE CLINIC | Facility: CLINIC | Age: 45
End: 2020-02-06

## 2020-02-06 DIAGNOSIS — R39.198 DIFFICULTY URINATING: Primary | ICD-10-CM

## 2020-02-06 LAB
BILIRUB BLD-MCNC: NEGATIVE MG/DL
CLARITY, POC: CLEAR
COLOR UR: YELLOW
GLUCOSE UR STRIP-MCNC: NEGATIVE MG/DL
KETONES UR QL: NEGATIVE
LEUKOCYTE EST, POC: ABNORMAL
NITRITE UR-MCNC: NEGATIVE MG/ML
PH UR: 7 [PH] (ref 5–8)
PROT UR STRIP-MCNC: ABNORMAL MG/DL
RBC # UR STRIP: ABNORMAL /UL
SP GR UR: 1.02 (ref 1–1.03)
UROBILINOGEN UR QL: NORMAL

## 2020-02-06 PROCEDURE — 81003 URINALYSIS AUTO W/O SCOPE: CPT | Performed by: NURSE PRACTITIONER

## 2020-02-06 PROCEDURE — 87086 URINE CULTURE/COLONY COUNT: CPT | Performed by: NURSE PRACTITIONER

## 2020-02-06 RX ORDER — NITROFURANTOIN 25; 75 MG/1; MG/1
100 CAPSULE ORAL 2 TIMES DAILY
Qty: 10 CAPSULE | Refills: 0 | Status: SHIPPED | OUTPATIENT
Start: 2020-02-06 | End: 2020-02-11

## 2020-02-06 NOTE — TELEPHONE ENCOUNTER
Please let pt know that her urine still showed some blood and some trace leukocytes.  We are going to culture it and I will send in antibiotic to start covering for a UTI

## 2020-02-07 LAB — BACTERIA SPEC AEROBE CULT: NORMAL

## 2020-02-18 ENCOUNTER — TELEPHONE (OUTPATIENT)
Dept: FAMILY MEDICINE CLINIC | Facility: CLINIC | Age: 45
End: 2020-02-18

## 2020-02-18 ENCOUNTER — OFFICE VISIT (OUTPATIENT)
Dept: FAMILY MEDICINE CLINIC | Facility: CLINIC | Age: 45
End: 2020-02-18

## 2020-02-18 VITALS
SYSTOLIC BLOOD PRESSURE: 141 MMHG | HEART RATE: 77 BPM | HEIGHT: 63 IN | DIASTOLIC BLOOD PRESSURE: 85 MMHG | RESPIRATION RATE: 16 BRPM | OXYGEN SATURATION: 100 % | BODY MASS INDEX: 30.16 KG/M2 | WEIGHT: 170.2 LBS | TEMPERATURE: 97.7 F

## 2020-02-18 DIAGNOSIS — M25.521 RIGHT ELBOW PAIN: Primary | ICD-10-CM

## 2020-02-18 PROCEDURE — 99213 OFFICE O/P EST LOW 20 MIN: CPT | Performed by: NURSE PRACTITIONER

## 2020-02-18 RX ORDER — MELOXICAM 15 MG/1
15 TABLET ORAL DAILY
Qty: 30 TABLET | Refills: 0 | Status: SHIPPED | OUTPATIENT
Start: 2020-02-18 | End: 2020-08-19

## 2020-02-18 NOTE — PROGRESS NOTES
"Subjective   Eleanor Bonds is a 44 y.o. female.     Patient is here today with continued complaints of right elbow pain.  Last visit she was started on meloxicam.  She reports that she continues to pain.  She reports that she can hardly move her elbow in the mornings.  She is having to wear the elbow brace, which helps.  She reports that the meloxicam helps as well.  She has had work restrictions, but those are up.  She is willing to go see a specialist at this time.  Pain comes and goes and she rates it a 7/10.  Reports decreased , she drops items at work.  She tried doing the home exercises she was given, but they have not helped.       The following portions of the patient's history were reviewed and updated as appropriate: allergies, current medications, past family history, past medical history, past social history, past surgical history and problem list.    Review of Systems   Constitutional: Negative for chills, fatigue and fever.   Eyes: Negative for blurred vision and double vision.   Respiratory: Negative for chest tightness and shortness of breath.    Musculoskeletal:        Right elbow pain with movement   Neurological: Positive for weakness (right arm) and numbness (right fingers). Negative for dizziness and headache.       Objective   /85 (BP Location: Left arm, Patient Position: Sitting, Cuff Size: Large Adult)   Pulse 77   Temp 97.7 °F (36.5 °C) (Oral)   Resp 16   Ht 160 cm (63\")   Wt 77.2 kg (170 lb 3.2 oz)   LMP 02/01/2020   SpO2 100%   Breastfeeding No   BMI 30.15 kg/m²   Physical Exam   Constitutional: She is oriented to person, place, and time. She appears well-developed and well-nourished.   HENT:   Head: Normocephalic and atraumatic.   Right Ear: External ear normal.   Left Ear: External ear normal.   Eyes: Pupils are equal, round, and reactive to light. EOM are normal.   Neck: Normal range of motion. Neck supple.   Musculoskeletal: Normal range of motion. She exhibits " tenderness. She exhibits no edema.   Pain with reaching and trying to touch hair.   Neurological: She is alert and oriented to person, place, and time.   Skin: Skin is warm.   Psychiatric: She has a normal mood and affect. Her behavior is normal. Judgment and thought content normal.         Assessment/Plan     Diagnoses and all orders for this visit:    1. Right elbow pain (Primary)  Comments:  likely lateral epicondylitis  continue meloxicam  PT ordered  referral to sports med for further eval  Orders:  -     Ambulatory Referral to Orthopedic Surgery  -     Ambulatory Referral to Physical Therapy Evaluate and treat  -     meloxicam (MOBIC) 15 MG tablet; Take 1 tablet by mouth Daily.  Dispense: 30 tablet; Refill: 0

## 2020-02-18 NOTE — TELEPHONE ENCOUNTER
[facility]. THEY HAS A THERAPIST THAT SPECIALIZES IN THE [place]OW. THEY WOULD LIKE THE REFERRAL CHANGED TO OCCUPATIONAL THERAPY. SHE WILL BE GOING TO [facility].

## 2020-02-21 ENCOUNTER — OFFICE VISIT (OUTPATIENT)
Dept: ORTHOPEDIC SURGERY | Facility: CLINIC | Age: 45
End: 2020-02-21

## 2020-02-21 VITALS
SYSTOLIC BLOOD PRESSURE: 148 MMHG | BODY MASS INDEX: 29.95 KG/M2 | HEIGHT: 63 IN | WEIGHT: 169 LBS | HEART RATE: 76 BPM | DIASTOLIC BLOOD PRESSURE: 90 MMHG

## 2020-02-21 DIAGNOSIS — M77.11 LATERAL EPICONDYLITIS OF RIGHT ELBOW: ICD-10-CM

## 2020-02-21 DIAGNOSIS — M25.521 RIGHT ELBOW PAIN: Primary | ICD-10-CM

## 2020-02-21 PROCEDURE — 99213 OFFICE O/P EST LOW 20 MIN: CPT | Performed by: FAMILY MEDICINE

## 2020-02-21 RX ORDER — METHYLPREDNISOLONE 4 MG/1
TABLET ORAL
Qty: 21 TABLET | Refills: 0 | Status: SHIPPED | OUTPATIENT
Start: 2020-02-21 | End: 2020-03-03

## 2020-02-21 NOTE — PROGRESS NOTES
Primary Care Sports Medicine Office Visit Note     Patient ID: Eleanor Bonds is a 44 y.o. female.    Chief Complaint:  Chief Complaint   Patient presents with   • Right Elbow - Initial Evaluation     HPI:    Ms. Eleanor Bonds is a 44 y.o. female who presents to the clinic today for R elbow pain. Started insidiously. Works at a [employer], and does repetitive pulling and twisting motions daily. She has been on work restrictions with this arm for one month now from her PCP, but unfortunately this elbow is still causing her pain. Is taking meloxicam and wearing tennis elbow strap brace as well. Is attempting exercises she was given as well. Seems to help at work, but unfortunately pain returns fairly quickly with use and     Past Medical History:   Diagnosis Date   • GERD (gastroesophageal reflux disease)    • Low back pain        Past Surgical History:   Procedure Laterality Date   • BACK SURGERY         Family History   Problem Relation Age of Onset   • Diabetes Mother    • Hyperlipidemia Mother    • Hypertension Mother    • Arthritis Mother    • Heart disease Father    • Hyperlipidemia Father    • Hypertension Father    • COPD Father    • Emphysema Father      Social History     Occupational History   • Not on file   Tobacco Use   • Smoking status: Former Smoker   • Smokeless tobacco: Never Used   Substance and Sexual Activity   • Alcohol use: Yes     Comment: occ   • Drug use: No   • Sexual activity: Yes      Review of Systems   Constitutional: Negative for activity change and fever.   Respiratory: Negative for cough and shortness of breath.    Cardiovascular: Negative for chest pain.   Gastrointestinal: Negative for constipation, diarrhea, nausea and vomiting.   Musculoskeletal: Positive for arthralgias.   Skin: Negative for color change and rash.   Neurological: Negative for weakness.   Hematological: Does not bruise/bleed easily.       Objective:    /90 (BP Location: Left arm, Patient Position:  "Sitting, Cuff Size: Large Adult)   Pulse 76   Ht 160 cm (63\")   Wt 76.7 kg (169 lb)   LMP 02/01/2020   BMI 29.94 kg/m²     Physical Examination:  Physical Exam   Constitutional: She appears well-developed and well-nourished. No distress.   HENT:   Head: Normocephalic and atraumatic.   Eyes: Conjunctivae are normal.   Cardiovascular: Intact distal pulses.   Pulmonary/Chest: Effort normal. No respiratory distress.   Neurological: She is alert.   Skin: Skin is warm. Capillary refill takes less than 2 seconds. She is not diaphoretic.   Nursing note and vitals reviewed.    Right Elbow Exam     Tenderness   The patient is experiencing tenderness in the lateral epicondyle.     Range of Motion   Extension: normal   Flexion: normal   Pronation: normal   Supination: normal     Muscle Strength   Pronation:  5/5   Supination:  5/5     Tests   Tinel's sign (cubital tunnel): positive    Other   Erythema: absent  Sensation: normal          Imaging and other tests:  Three-view XR of the right elbow shows no obvious fracture, malalignment, or dislocation.    Assessment and Plan:    1. Right elbow pain  - XR Elbow 2 View Right  - methylPREDNISolone (MEDROL) 4 MG tablet; Take as directed on package instructions.  Dispense: 21 tablet; Refill: 0    2. Lateral epicondylitis of right elbow    I discussed treatment options with this patient today.  She is already doing excellent and the fact that she has discontinued significant use at work, wearing a lateral tennis elbow strap, and taking anti-inflammatories.  We could improve this with a corticosteroid taper as I feel this would not only help her lateral epicondylitis, but some mild ulnar neuropathy that she is having on exam today. RTC 1 month, could consider injection at that time if symptoms have not improved.    Adam AHUMADA \"Chance\" En ANGUIANO DO, CAQSM  02/25/20  11:35 AM    Disclaimer: Please note that areas of this note were completed with computer voice recognition software.  " Quite often unanticipated grammatical, syntax, homophones, and other interpretive errors are inadvertently transcribed by the computer software. Please excuse any errors that have escaped final proofreading.

## 2020-02-24 ENCOUNTER — TREATMENT (OUTPATIENT)
Dept: PHYSICAL THERAPY | Facility: CLINIC | Age: 45
End: 2020-02-24

## 2020-02-24 DIAGNOSIS — M25.521 RIGHT ELBOW PAIN: Primary | ICD-10-CM

## 2020-02-24 PROCEDURE — 97166 OT EVAL MOD COMPLEX 45 MIN: CPT | Performed by: OCCUPATIONAL THERAPIST

## 2020-02-24 PROCEDURE — 97140 MANUAL THERAPY 1/> REGIONS: CPT | Performed by: OCCUPATIONAL THERAPIST

## 2020-02-24 NOTE — PROGRESS NOTES
Occupational Therapy Initial Evaluation    Patient: Eleanor Bonds   : 1975  Diagnosis/ICD-10 Code:  Right elbow pain [M25.521]  Referring practitioner: HOLLEY Valdez  Date of Initial Visit: 2020  Today's Date: 2020  Patient seen for 1 sessions               Subjective Questionnaire: QuickDASH: 56      Subjective pt is a 44 yr old female.   She reports that she awoke when day and her elbow hurt and it has hurt ever since   She has seen orthopedic doctor who feels that she has tendonitis or possible nerve issue.    She is wearing an elbow splint and she reports that she does exercises even at work.  She reports that she is on restricted duty at work   She works at [employer] as a  ,she has been there 3 yrs.    She is also on anti inflammatory meds Moloxin 15 mg       Objective   Wrist range of motion is WFL   Supination /pronation and forearm all WFL   MMT of the wrist is 3t/5  Forearm also 3t/5  During MMT of forearm pt did report pain at 3-4/10   strength on the right is average is 30 lbs but with reported pain      Left  is average is 45 lbs   Dexterity on 9 hole on right is 24 sec and left is 21 sec   Prehension   Right             Left   10   Lat       17  6     3pt    10  circumferential measurements at elbow are same at 26 cm         Assessment & Plan     Assessment  Impairments: activity intolerance, impaired physical strength, pain with function and weight-bearing intolerance  Assessment details: Pt reports that she has difficulty with all those movements at work   She reports that her elbow does not bother her much at home, Maybe a little when she vacuums.  Pt did report pain with MMT of forearm and gripping   She also reported tenderness at elbow with palpation but no pain with pressure from US      Barriers to therapy: pain   Prognosis: fair  Functional Limitations: lifting, pulling, pushing, uncomfortable because of pain and unable to perform repetitive  tasks  Goals  Plan Goals: STG   Review  HEP   STG   Isometric exercises for forearm     LTG nerve glides   LTG ergonomic  review for work   LTG   Pain relief measures     Pt is indicated for skilled OT    Plan  Planned modality interventions: ultrasound  Planned therapy interventions: body mechanics training, fine motor coordination training, flexibility, home exercise program, strengthening, stretching, therapeutic activities, postural training and manual therapy  Frequency: 2x week  Plan details: 30        History # of Personal Factors and/or Comorbidities: MODERATE (1-2)  Examination of Body System(s): # of elements: MODERATE (3)  Clinical Presentation: EVOLVING  Clinical Decision Making: MODERATE      Timed Codes        Manual Therapy:    15     mins  10420;    Therapeutic Exercise:         mins  92309;     Neuromuscular Nell:        mins  31411;    Therapeutic Activity:          mins  13349;      Ultrasound:          mins  19384;    Community/ work         mins  22518  Self Care/ Sharon         mins  38411  Sensory Re-Int.                  mins   51487  Cognitve Re-train         mins  79673     Un-timed Codes  Electrical Stimulation:         mins 9 88032 ( );  OT low eval          mins  15683  OT mod eval.              45     mins   00253  OT high eval          mins 13344        Timed Treatment:   15   mins   Total Treatment:     60   mins    OT SIGNATURE: Rita Cunningham OT   DATE TREATMENT INITIATED: 2/24/2020    Initial Certification  Certification Period: 5/24/2020  I certify that the therapy services are furnished while this patient is under my care.  The services outlined above are required by this patient, and will be reviewed every 90 days.     PHYSICIAN: Lizz Moraes, HOLLEY      DATE:     Please sign and return via fax to [phone].. Thank you, [facility] Occupational Therapy.

## 2020-03-02 ENCOUNTER — TREATMENT (OUTPATIENT)
Dept: PHYSICAL THERAPY | Facility: CLINIC | Age: 45
End: 2020-03-02

## 2020-03-02 DIAGNOSIS — M25.521 RIGHT ELBOW PAIN: Primary | ICD-10-CM

## 2020-03-02 PROCEDURE — 97110 THERAPEUTIC EXERCISES: CPT | Performed by: OCCUPATIONAL THERAPIST

## 2020-03-02 PROCEDURE — 97140 MANUAL THERAPY 1/> REGIONS: CPT | Performed by: OCCUPATIONAL THERAPIST

## 2020-03-02 NOTE — PROGRESS NOTES
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   Pt reports pain and issues with squeezing    Continue with isometric strengthening around elbow fle/ext   Use of interferential for pain control     Assessment/Plan    Progress strengthening /stabilization /functional activity           Timed Codes        Manual Therapy:    15     mins  56983;    Therapeutic Exercise:    15     mins  70795;     Neuromuscular Nell:        mins  92879;    Therapeutic Activity:          mins  53530;      Ultrasound:          mins  79289;    Community/ work         mins  50909  Self Care/ Sharon         mins  60308  Sensory Re-Int.                  mins   05435  Cognitve Re-train         mins  97279     Un-timed Codes  Electrical Stimulation:         mins 9 90859 ( );      Timed Treatment:  30   mins   Total Treatment:    30   mins    Rita Cunningham OT  Occupational Therapist

## 2020-03-03 ENCOUNTER — OFFICE VISIT (OUTPATIENT)
Dept: FAMILY MEDICINE CLINIC | Facility: CLINIC | Age: 45
End: 2020-03-03

## 2020-03-03 VITALS
TEMPERATURE: 98.2 F | DIASTOLIC BLOOD PRESSURE: 84 MMHG | RESPIRATION RATE: 16 BRPM | OXYGEN SATURATION: 100 % | HEIGHT: 63 IN | BODY MASS INDEX: 28.74 KG/M2 | WEIGHT: 162.2 LBS | SYSTOLIC BLOOD PRESSURE: 125 MMHG | HEART RATE: 74 BPM

## 2020-03-03 DIAGNOSIS — R50.9 FEVER AND CHILLS: ICD-10-CM

## 2020-03-03 DIAGNOSIS — R11.10 VOMITING AND DIARRHEA: Primary | ICD-10-CM

## 2020-03-03 DIAGNOSIS — R19.7 VOMITING AND DIARRHEA: Primary | ICD-10-CM

## 2020-03-03 LAB
EXPIRATION DATE: NORMAL
FLUAV AG NPH QL: NEGATIVE
FLUBV AG NPH QL: NEGATIVE
INTERNAL CONTROL: NORMAL
Lab: NORMAL

## 2020-03-03 PROCEDURE — 99213 OFFICE O/P EST LOW 20 MIN: CPT | Performed by: NURSE PRACTITIONER

## 2020-03-03 PROCEDURE — 87804 INFLUENZA ASSAY W/OPTIC: CPT | Performed by: NURSE PRACTITIONER

## 2020-03-03 RX ORDER — ONDANSETRON 4 MG/1
4 TABLET, ORALLY DISINTEGRATING ORAL EVERY 8 HOURS PRN
Qty: 30 TABLET | Refills: 0 | Status: SHIPPED | OUTPATIENT
Start: 2020-03-03 | End: 2020-08-19

## 2020-03-03 NOTE — PATIENT INSTRUCTIONS
Please give note for work for yesterday and today  Push fluid intake  Follow BRAT diet- bananas, rice, applesauce, and toast  Call if no improvement  zofran as needed

## 2020-03-03 NOTE — PROGRESS NOTES
"Subjective   Eleanor Bonds is a 44 y.o. female.     Patient is here today with complaints of vomiting, nausea, diarrhea, and chills.  Symptoms started yesterday.  Last night she had a fever of 101.4.  She reports that she started vomiting after lunch yesterday around 1pm and continued vomiting every couple of hours until midnight.  She has not vomited since.  Diarrhea developed yesterday around 4pm.  She had diarrhea early this morning.  She continues to be nauseas.  She has not been able to eat or drink because she is nervous to get sick.  She is now starting to have hot flashes.  She was exposed to Flu B and a recent GI virus.  She is started to feel a little better.  She has tried pepto bismuth, but vomited it.         The following portions of the patient's history were reviewed and updated as appropriate: allergies, current medications, past family history, past medical history, past social history, past surgical history and problem list.    Review of Systems   Constitutional: Positive for chills, diaphoresis and fatigue. Negative for fever.   HENT: Negative for congestion, ear pain, sinus pressure and sore throat.    Respiratory: Negative for chest tightness and shortness of breath.    Cardiovascular: Negative for chest pain and palpitations.   Gastrointestinal: Positive for abdominal pain (before vomiting), diarrhea, nausea and vomiting.   Musculoskeletal: Positive for myalgias.   Neurological: Positive for headache. Negative for dizziness.       Objective   /84 (BP Location: Left arm, Patient Position: Sitting, Cuff Size: Large Adult)   Pulse 74   Temp 98.2 °F (36.8 °C) (Oral)   Resp 16   Ht 160 cm (63\")   Wt 73.6 kg (162 lb 3.2 oz)   LMP 03/03/2020   SpO2 100%   Breastfeeding No   BMI 28.73 kg/m²   Physical Exam   Constitutional: She is oriented to person, place, and time. She appears well-developed and well-nourished. No distress.   HENT:   Head: Normocephalic and atraumatic. "   Cardiovascular: Normal rate, regular rhythm and normal heart sounds.   Pulmonary/Chest: Effort normal and breath sounds normal. No respiratory distress. She has no wheezes.   Abdominal: Soft. She exhibits no mass. There is no tenderness.   Hyperactive bowel sounds   Neurological: She is alert and oriented to person, place, and time.   Psychiatric: She has a normal mood and affect. Her behavior is normal. Judgment and thought content normal.         Assessment/Plan     Diagnoses and all orders for this visit:    1. Vomiting and diarrhea (Primary)  Comments:  appears to be viral  improving  zofran as needed  push fluids  BRAT diet  return if no better  Orders:  -     ondansetron ODT (ZOFRAN ODT) 4 MG disintegrating tablet; Take 1 tablet by mouth Every 8 (Eight) Hours As Needed for Nausea or Vomiting.  Dispense: 30 tablet; Refill: 0    2. Fever and chills  Comments:  likely due to GI virus  ibuprofen or tylenol prn  push fluids  Orders:  -     POCT Influenza A/B

## 2020-03-09 ENCOUNTER — TREATMENT (OUTPATIENT)
Dept: PHYSICAL THERAPY | Facility: CLINIC | Age: 45
End: 2020-03-09

## 2020-03-09 DIAGNOSIS — M25.521 RIGHT ELBOW PAIN: Primary | ICD-10-CM

## 2020-03-09 PROCEDURE — 97110 THERAPEUTIC EXERCISES: CPT | Performed by: OCCUPATIONAL THERAPIST

## 2020-03-09 PROCEDURE — 97140 MANUAL THERAPY 1/> REGIONS: CPT | Performed by: OCCUPATIONAL THERAPIST

## 2020-03-09 NOTE — PROGRESS NOTES
OccupationalTherapy Daily Progress Note        Patient: Eleanor Bonds   : 1975  Diagnosis/ICD-10 Code:  Right elbow pain [M25.521]  Referring practitioner: HOLLEY Valdez  Date of Initial Visit: Type: THERAPY  Noted: 2020  Today's Date: 3/9/2020  Patient seen for 3 sessions               Subjective  Pt stating that maybe she did too much over the weekend   She is tired and sore in the elbow   Objective   Pt reporting upon arrival that she is 7/10  Following outlined program of strengthening   Pt reports 7/10 pain    Reduced to 5/10   Ended program with pain relief measures Ice compression n  See Exercise, Manual, and Modality Logs for complete treatment.       Assessment/Plan    Progress per Plan of Care           Timed Codes        Manual Therapy:    15     mins  61929;    Therapeutic Exercise:    15     mins  78510;     Neuromuscular Nell:        mins  54879;    Therapeutic Activity:          mins  11024;      Ultrasound:          mins  44278;    Community/ work         mins  41702  Self Care/ Sharon         mins  23747  Sensory Re-Int.                  mins   61674  Cognitve Re-train         mins  26047     Un-timed Codes  Electrical Stimulation:         mins 9 41505 ( );      Timed Treatment:   30   mins   Total Treatment:    30    mins    Rita Cunningham OT  Occupational Therapist

## 2020-03-16 ENCOUNTER — TREATMENT (OUTPATIENT)
Dept: PHYSICAL THERAPY | Facility: CLINIC | Age: 45
End: 2020-03-16

## 2020-03-16 DIAGNOSIS — M25.521 RIGHT ELBOW PAIN: Primary | ICD-10-CM

## 2020-03-16 PROCEDURE — 97110 THERAPEUTIC EXERCISES: CPT | Performed by: OCCUPATIONAL THERAPIST

## 2020-03-16 PROCEDURE — 97140 MANUAL THERAPY 1/> REGIONS: CPT | Performed by: OCCUPATIONAL THERAPIST

## 2020-03-16 NOTE — PROGRESS NOTES
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       Assessment/Plan    Progress per Plan of Care           Timed Codes        Manual Therapy:    15     mins  10946;    Therapeutic Exercise:    15     mins  83603;     Neuromuscular Nell:        mins  02816;    Therapeutic Activity:          mins  74408;      Ultrasound:          mins  88352;    Community/ work         mins  03136  Self Care/ Sharon         mins  18841  Sensory Re-Int.                  mins   47849  Cognitve Re-train         mins  79632     Un-timed Codes  Electrical Stimulation:         mins 9 28239 ( );      Timed Treatment:  30    mins   Total Treatment:     30   mins    Rita Cunningham OT  Occupational Therapist

## 2020-03-20 ENCOUNTER — OFFICE VISIT (OUTPATIENT)
Dept: ORTHOPEDIC SURGERY | Facility: CLINIC | Age: 45
End: 2020-03-20

## 2020-03-20 VITALS
BODY MASS INDEX: 29.41 KG/M2 | SYSTOLIC BLOOD PRESSURE: 128 MMHG | WEIGHT: 166 LBS | HEART RATE: 62 BPM | TEMPERATURE: 98.1 F | HEIGHT: 63 IN | DIASTOLIC BLOOD PRESSURE: 85 MMHG

## 2020-03-20 DIAGNOSIS — G56.01 CARPAL TUNNEL SYNDROME ON RIGHT: ICD-10-CM

## 2020-03-20 DIAGNOSIS — M77.11 LATERAL EPICONDYLITIS OF RIGHT ELBOW: Primary | ICD-10-CM

## 2020-03-20 PROCEDURE — 20550 NJX 1 TENDON SHEATH/LIGAMENT: CPT | Performed by: FAMILY MEDICINE

## 2020-03-20 PROCEDURE — 99214 OFFICE O/P EST MOD 30 MIN: CPT | Performed by: FAMILY MEDICINE

## 2020-03-20 RX ORDER — TRIAMCINOLONE ACETONIDE 40 MG/ML
40 INJECTION, SUSPENSION INTRA-ARTICULAR; INTRAMUSCULAR
Status: COMPLETED | OUTPATIENT
Start: 2020-03-20 | End: 2020-03-20

## 2020-03-20 RX ADMIN — TRIAMCINOLONE ACETONIDE 40 MG: 40 INJECTION, SUSPENSION INTRA-ARTICULAR; INTRAMUSCULAR at 12:07

## 2020-03-20 NOTE — PROGRESS NOTES
Procedure   Injection Tendon or Ligament  Date/Time: 3/20/2020 12:07 PM  Performed by: Adam Gatica II, DO  Authorized by: Adam Gatica II, DO   Local anesthesia used: no    Anesthesia:  Local anesthesia used: no    Sedation:  Patient sedated: no    Comments: A R lateral epicondyle ECRB tendon injection was performed today.  After risks and benefits were discussed, and patient was identified, the R lateral elbow was prepped and draped in usual fashion.  Alcohol and Betadine was used to cleanse the skin.  Ethyl chloride spray was used for skin anesthesia. A 22-gauge 1/2 inch needle was then used to infiltrate the Tendinous insertion.  1cc of 1% lidocaine without epinephrine and 1cc of Kenalog 40 mg was then injected into the insertion. Due to calcific changes felt on needle inflitration, it was left inserted and 60 sec was passed for anesthesia. The needle was then used to fenestrate the calcific debris within the tendon. It was then removed without complication.  Blood loss negligible.  Patient admits to near immediate pain relief with gentle range of motion post injection.     Medications administered: 40 mg triamcinolone acetonide 40 MG/ML

## 2020-03-20 NOTE — PROGRESS NOTES
"Primary Care Sports Medicine Office Visit Note     Patient ID: Eleanor Bonds is a 44 y.o. female.    Chief Complaint:  Chief Complaint   Patient presents with   • Right Elbow - Follow-up     HPI:    Ms. Eleanor Bonds is a 44 y.o. female who returns to the clinic today for right elbow pain.  She states that after her visit 1 month ago, she took a Medrol Dosepak without much relief.  She continues to have moderate achy pain about the lateral aspect of the right elbow.  She also now is having some tingling and numbness in the third and fourth digits of this finger as well.  This is a new symptom.  We discussed radiating pain from her lateral elbow in a radial nerve fashion, but at this time she also is having some radiating pain from her wrist as well.  Worse at night, waking her up from sleep.    Past Medical History:   Diagnosis Date   • GERD (gastroesophageal reflux disease)    • Low back pain        Past Surgical History:   Procedure Laterality Date   • BACK SURGERY         Family History   Problem Relation Age of Onset   • Diabetes Mother    • Hyperlipidemia Mother    • Hypertension Mother    • Arthritis Mother    • Heart disease Father    • Hyperlipidemia Father    • Hypertension Father    • COPD Father    • Emphysema Father      Social History     Occupational History   • Not on file   Tobacco Use   • Smoking status: Former Smoker   • Smokeless tobacco: Never Used   Substance and Sexual Activity   • Alcohol use: Yes     Comment: occ   • Drug use: No   • Sexual activity: Yes      Review of Systems   Constitutional: Negative for activity change and fever.   Musculoskeletal: Positive for arthralgias.   Skin: Negative for color change and rash.   Neurological: Positive for numbness. Negative for weakness.       Objective:    /85   Pulse 62   Temp 98.1 °F (36.7 °C) (Oral)   Ht 160 cm (63\")   Wt 75.3 kg (166 lb)   LMP 03/03/2020   BMI 29.41 kg/m²     Physical Examination:  Physical Exam " "  Constitutional: She appears well-developed and well-nourished. No distress.   HENT:   Head: Normocephalic and atraumatic.   Eyes: Conjunctivae are normal.   Cardiovascular: Intact distal pulses.   Pulmonary/Chest: Effort normal. No respiratory distress.   Neurological: She is alert.   Skin: Skin is warm. Capillary refill takes less than 2 seconds. She is not diaphoretic.   Nursing note and vitals reviewed.    Right Hand Exam     Tests   Phalen’s sign: positive  Tinel's sign (median nerve): negative      Right Elbow Exam     Tenderness   The patient is experiencing tenderness in the lateral epicondyle.     Range of Motion   Extension: normal   Flexion: normal   Pronation: normal   Supination: normal     Muscle Strength   Pronation:  5/5   Supination:  5/5     Tests   Tinel's sign (cubital tunnel): positive    Other   Erythema: absent  Sensation: normal    Comments:  Pain with resisted external rotation, resisted middle finger extension.          Imaging and other tests:  No new imaging today.    Assessment and Plan:    1. Lateral epicondylitis of right elbow    2. Carpal tunnel syndrome on right    After discussing risk and benefits, and the fact that the patient has relatively failed conservative management, she elected to proceed with corticosteroid injection to the lateral condyle.  We discussed the fact that this is not a repetitive procedure, and this will likely be her only injection today.  She is comfortable with this.  She tolerated the procedure well without complaints or problems.  RTC in 4 weeks if no improvement.    Otherwise we did also discuss the numbness that she is having, which I feel is related to more of a median neuropathy.  I would like to see her attempt at nighttime bracing for the next 1 month until follow-up.    Adam AHUMADA \"Chance\" En ANGUIANO DO, CAQSM  03/20/20  11:53    Disclaimer: Please note that areas of this note were completed with computer voice recognition software.  Quite often " unanticipated grammatical, syntax, homophones, and other interpretive errors are inadvertently transcribed by the computer software. Please excuse any errors that have escaped final proofreading.

## 2020-04-27 ENCOUNTER — OFFICE VISIT (OUTPATIENT)
Dept: ORTHOPEDIC SURGERY | Facility: CLINIC | Age: 45
End: 2020-04-27

## 2020-04-27 VITALS
WEIGHT: 172.2 LBS | HEIGHT: 63 IN | TEMPERATURE: 98 F | BODY MASS INDEX: 30.51 KG/M2 | SYSTOLIC BLOOD PRESSURE: 130 MMHG | HEART RATE: 80 BPM | DIASTOLIC BLOOD PRESSURE: 82 MMHG

## 2020-04-27 DIAGNOSIS — M77.11 LATERAL EPICONDYLITIS OF RIGHT ELBOW: Primary | ICD-10-CM

## 2020-04-27 PROCEDURE — 99213 OFFICE O/P EST LOW 20 MIN: CPT | Performed by: FAMILY MEDICINE

## 2020-04-27 NOTE — PROGRESS NOTES
"Primary Care Sports Medicine Office Visit Note     Patient ID: Eleanor Bonds is a 44 y.o. female.    Chief Complaint:  Chief Complaint   Patient presents with   • Right Elbow - Follow-up     HPI:    Ms. Eleanor Bonds is a 44 y.o. female who returns to the clinic today for tennis elbow follow-up.  The patient states she is doing incredibly well, and injection helped significantly.  She has not returned to any strenuous amount of physical activity, as currently she has been furloughed from her position during COVID-19 pandemic.  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  Otherwise she seems to be doing well.    Past Medical History:   Diagnosis Date   • GERD (gastroesophageal reflux disease)    • Low back pain        Past Surgical History:   Procedure Laterality Date   • BACK SURGERY         Family History   Problem Relation Age of Onset   • Diabetes Mother    • Hyperlipidemia Mother    • Hypertension Mother    • Arthritis Mother    • Heart disease Father    • Hyperlipidemia Father    • Hypertension Father    • COPD Father    • Emphysema Father      Social History     Occupational History   • Not on file   Tobacco Use   • Smoking status: Former Smoker   • Smokeless tobacco: Never Used   Substance and Sexual Activity   • Alcohol use: Yes     Comment: occ   • Drug use: No   • Sexual activity: Yes      Review of Systems   Constitutional: Negative for activity change and fever.   Musculoskeletal: Negative for arthralgias.   Skin: Negative for color change and rash.   Neurological: Negative for weakness.       Objective:    /82 (BP Location: Left arm, Patient Position: Sitting, Cuff Size: Adult)   Pulse 80   Temp 98 °F (36.7 °C) (Oral)   Ht 160 cm (63\")   Wt 78.1 kg (172 lb 3.2 oz)   BMI 30.50 kg/m²     Physical Examination:  Physical Exam   Constitutional: She appears well-developed and well-nourished. No " "distress.   HENT:   Head: Normocephalic and atraumatic.   Eyes: Conjunctivae are normal.   Cardiovascular: Intact distal pulses.   Pulmonary/Chest: Effort normal. No respiratory distress.   Neurological: She is alert.   Skin: Skin is warm. Capillary refill takes less than 2 seconds. She is not diaphoretic.   Nursing note and vitals reviewed.    Right Elbow Exam     Tenderness   The patient is experiencing no tenderness.     Range of Motion   Extension: normal   Flexion: normal   Pronation: normal   Supination: normal     Muscle Strength   Supination:  5/5     Tests   Tinel's sign (cubital tunnel): negative    Other   Erythema: absent  Sensation: normal  Pulse: present          Imaging and other tests:  No new imaging today.    Assessment and Plan:    1. Lateral epicondylitis of right elbow    Patient seems to be doing incredibly well status post injection.  Continue conservative measures.  Fortunately she will not be returning to as much physical labor at work with her promotion.  Hopefully she will continue to do well.  We discussed reasons for return.  RTC PRN.    Adam AHUMADA \"Chance\" En ANGUIANO DO, CAQSM  04/27/20  15:21    Disclaimer: Please note that areas of this note were completed with computer voice recognition software.  Quite often unanticipated grammatical, syntax, homophones, and other interpretive errors are inadvertently transcribed by the computer software. Please excuse any errors that have escaped final proofreading.  "

## 2020-08-19 ENCOUNTER — OFFICE VISIT (OUTPATIENT)
Dept: ORTHOPEDIC SURGERY | Facility: CLINIC | Age: 45
End: 2020-08-19

## 2020-08-19 VITALS
SYSTOLIC BLOOD PRESSURE: 148 MMHG | DIASTOLIC BLOOD PRESSURE: 101 MMHG | HEIGHT: 63 IN | HEART RATE: 62 BPM | BODY MASS INDEX: 30.48 KG/M2 | WEIGHT: 172 LBS

## 2020-08-19 DIAGNOSIS — M77.12 LATERAL EPICONDYLITIS OF LEFT ELBOW: ICD-10-CM

## 2020-08-19 DIAGNOSIS — M25.522 LEFT ELBOW PAIN: Primary | ICD-10-CM

## 2020-08-19 PROCEDURE — 99213 OFFICE O/P EST LOW 20 MIN: CPT | Performed by: FAMILY MEDICINE

## 2020-08-19 PROCEDURE — 20550 NJX 1 TENDON SHEATH/LIGAMENT: CPT | Performed by: FAMILY MEDICINE

## 2020-08-19 RX ORDER — TRIAMCINOLONE ACETONIDE 40 MG/ML
40 INJECTION, SUSPENSION INTRA-ARTICULAR; INTRAMUSCULAR
Status: COMPLETED | OUTPATIENT
Start: 2020-08-19 | End: 2020-08-19

## 2020-08-19 RX ADMIN — TRIAMCINOLONE ACETONIDE 40 MG: 40 INJECTION, SUSPENSION INTRA-ARTICULAR; INTRAMUSCULAR at 14:48

## 2020-08-19 NOTE — PROGRESS NOTES
Procedure   Injection Tendon or Ligament  Date/Time: 8/19/2020 2:48 PM  Performed by: Adam Gatica II, DO  Authorized by: Adam Gatica II, DO   Preparation: Patient was prepped and draped in the usual sterile fashion.  Local anesthesia used: no    Anesthesia:  Local anesthesia used: no    Sedation:  Patient sedated: no    Medications administered: 40 mg triamcinolone acetonide 40 MG/ML      A L lateral epicondyle ECRB tendon injection was performed today.  After risks and benefits were discussed, and patient was identified, the L lateral elbow was prepped and draped in usual fashion.  Alcohol and Betadine was used to cleanse the skin.  Ethyl chloride spray was used for skin anesthesia. A 22-gauge 1/2 inch needle was then used to infiltrate the Tendinous insertion.  1cc of 1% lidocaine without epinephrine and 1cc of Kenalog 40 mg was then injected into the insertion. Due to calcific changes felt on needle inflitration, it was left inserted and 60 sec was passed for anesthesia. The needle was then used to fenestrate the calcific debris within the tendon. It was then removed without complication.  Blood loss negligible.  Patient admits to near immediate pain relief with gentle range of motion post injection.

## 2020-08-19 NOTE — PROGRESS NOTES
"[facility] Sports Medicine Office Visit Note     Patient ID: Eleanor Bonds is a 44 y.o. female.    Chief Complaint:  Chief Complaint   Patient presents with   • Left Elbow - Follow-up, Initial Evaluation     HPI:    Ms. Eleanor Bonds is a 44 y.o. female who presents to the clinic today for new L sided elbow pain. She states that pain in her lateral L elbow is near exactly the same as the pain was in her R elbow. She was previously seen and treated for lateral epicondylitis, and received corticosteroid injection. This seemed to work incredibly well and she has no had any further pain or problems with R elbow at all. Unfortunately now she has a significant amount of pain in her L elbow. Has attempted braces, IBU, icing etc, but little to no relief. Requests repeat injection today.     Past Medical History:   Diagnosis Date   • GERD (gastroesophageal reflux disease)    • Low back pain        Past Surgical History:   Procedure Laterality Date   • BACK SURGERY         Family History   Problem Relation Age of Onset   • Diabetes Mother    • Hyperlipidemia Mother    • Hypertension Mother    • Arthritis Mother    • Heart disease Father    • Hyperlipidemia Father    • Hypertension Father    • COPD Father    • Emphysema Father      Social History     Occupational History   • Not on file   Tobacco Use   • Smoking status: Former Smoker   • Smokeless tobacco: Never Used   Substance and Sexual Activity   • Alcohol use: Yes     Comment: occ   • Drug use: No   • Sexual activity: Yes      Review of Systems   Constitutional: Negative for activity change and fever.   Musculoskeletal: Positive for arthralgias.   Skin: Negative for color change and rash.   Neurological: Negative for weakness.       Objective:    BP (!) 148/101 (BP Location: Right arm, Patient Position: Sitting, Cuff Size: Adult)   Pulse 62   Ht 160 cm (63\")   Wt 78 kg (172 lb)   BMI 30.47 kg/m²     Physical Examination:  Physical Exam   Constitutional: " "She appears well-developed and well-nourished. No distress.   HENT:   Head: Normocephalic and atraumatic.   Eyes: Conjunctivae are normal.   Cardiovascular: Intact distal pulses.   Pulmonary/Chest: Effort normal. No respiratory distress.   Neurological: She is alert.   Skin: Skin is warm. Capillary refill takes less than 2 seconds. She is not diaphoretic.   Nursing note and vitals reviewed.    Left Elbow Exam     Tenderness   The patient is experiencing no tenderness.     Range of Motion   Extension: normal   Flexion: normal   Pronation: normal   Supination: normal     Muscle Strength   Pronation:  5/5   Supination:  5/5     Other   Erythema: absent  Sensation: normal  Pulse: present    Comments:  Pain with resisted middle finger extension, supination        Imaging and other tests:  3v XR L elbow shows no obvious fracture, malalignment, or dislocation. Essentially negative XR L Elbow.     Assessment and Plan:    1. Left elbow pain  - XR Elbow 2 View Left    2. Lateral epicondylitis of left elbow    After discussion of risks and benefits, the patient elected to proceed with corticosteroid injection to the lateral epicondyle.  The patient tolerated this procedure well without any complaints or problems.  I recommended continuation of conservative management as previous, RTC in 3-6 months or sooner if symptoms recur.    Adam AHUMADA \"Chance\" En ANGUIANO DO, CAQSM  08/19/20  14:46    Disclaimer: Please note that areas of this note were completed with computer voice recognition software.  Quite often unanticipated grammatical, syntax, homophones, and other interpretive errors are inadvertently transcribed by the computer software. Please excuse any errors that have escaped final proofreading.  "

## 2020-09-21 ENCOUNTER — DOCUMENTATION (OUTPATIENT)
Dept: PHYSICAL THERAPY | Facility: CLINIC | Age: 45
End: 2020-09-21

## 2020-09-21 NOTE — PROGRESS NOTES
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

## 2020-11-19 ENCOUNTER — TRANSCRIBE ORDERS (OUTPATIENT)
Dept: ADMINISTRATIVE | Facility: HOSPITAL | Age: 45
End: 2020-11-19

## 2020-11-19 ENCOUNTER — LAB (OUTPATIENT)
Dept: LAB | Facility: HOSPITAL | Age: 45
End: 2020-11-19

## 2020-11-19 DIAGNOSIS — Z01.818 PREOP EXAMINATION: Primary | ICD-10-CM

## 2020-11-19 DIAGNOSIS — Z01.818 PREOP EXAMINATION: ICD-10-CM

## 2020-11-19 LAB
ABO GROUP BLD: NORMAL
BACTERIA UR QL AUTO: ABNORMAL /HPF
BASOPHILS # BLD AUTO: 0.04 10*3/MM3 (ref 0–0.2)
BASOPHILS NFR BLD AUTO: 0.4 % (ref 0–1.5)
BILIRUB UR QL STRIP: NEGATIVE
BLD GP AB SCN SERPL QL: NEGATIVE
CLARITY UR: ABNORMAL
COLOR UR: YELLOW
DEPRECATED RDW RBC AUTO: 39.4 FL (ref 37–54)
EOSINOPHIL # BLD AUTO: 0.22 10*3/MM3 (ref 0–0.4)
EOSINOPHIL NFR BLD AUTO: 1.9 % (ref 0.3–6.2)
ERYTHROCYTE [DISTWIDTH] IN BLOOD BY AUTOMATED COUNT: 13.4 % (ref 12.3–15.4)
GLUCOSE UR STRIP-MCNC: NEGATIVE MG/DL
HCT VFR BLD AUTO: 34.8 % (ref 34–46.6)
HGB BLD-MCNC: 11.6 G/DL (ref 12–15.9)
HGB UR QL STRIP.AUTO: NEGATIVE
HYALINE CASTS UR QL AUTO: ABNORMAL /LPF
IMM GRANULOCYTES # BLD AUTO: 0.03 10*3/MM3 (ref 0–0.05)
IMM GRANULOCYTES NFR BLD AUTO: 0.3 % (ref 0–0.5)
KETONES UR QL STRIP: ABNORMAL
LEUKOCYTE ESTERASE UR QL STRIP.AUTO: ABNORMAL
LYMPHOCYTES # BLD AUTO: 3.31 10*3/MM3 (ref 0.7–3.1)
LYMPHOCYTES NFR BLD AUTO: 29.1 % (ref 19.6–45.3)
MCH RBC QN AUTO: 27.4 PG (ref 26.6–33)
MCHC RBC AUTO-ENTMCNC: 33.3 G/DL (ref 31.5–35.7)
MCV RBC AUTO: 82.1 FL (ref 79–97)
MONOCYTES # BLD AUTO: 0.64 10*3/MM3 (ref 0.1–0.9)
MONOCYTES NFR BLD AUTO: 5.6 % (ref 5–12)
NEUTROPHILS NFR BLD AUTO: 62.7 % (ref 42.7–76)
NEUTROPHILS NFR BLD AUTO: 7.14 10*3/MM3 (ref 1.7–7)
NITRITE UR QL STRIP: NEGATIVE
NRBC BLD AUTO-RTO: 0 /100 WBC (ref 0–0.2)
PH UR STRIP.AUTO: 6.5 [PH] (ref 5–8)
PLATELET # BLD AUTO: 353 10*3/MM3 (ref 140–450)
PMV BLD AUTO: 11.8 FL (ref 6–12)
PROT UR QL STRIP: ABNORMAL
RBC # BLD AUTO: 4.24 10*6/MM3 (ref 3.77–5.28)
RBC # UR: ABNORMAL /HPF
REF LAB TEST METHOD: ABNORMAL
RH BLD: POSITIVE
SP GR UR STRIP: 1.03 (ref 1–1.03)
SQUAMOUS #/AREA URNS HPF: ABNORMAL /HPF
T&S EXPIRATION DATE: NORMAL
UROBILINOGEN UR QL STRIP: ABNORMAL
WBC # BLD AUTO: 11.38 10*3/MM3 (ref 3.4–10.8)
WBC UR QL AUTO: ABNORMAL /HPF

## 2020-11-19 PROCEDURE — 85025 COMPLETE CBC W/AUTO DIFF WBC: CPT

## 2020-11-19 PROCEDURE — 87086 URINE CULTURE/COLONY COUNT: CPT

## 2020-11-19 PROCEDURE — 86901 BLOOD TYPING SEROLOGIC RH(D): CPT

## 2020-11-19 PROCEDURE — 86922 COMPATIBILITY TEST ANTIGLOB: CPT

## 2020-11-19 PROCEDURE — 81001 URINALYSIS AUTO W/SCOPE: CPT

## 2020-11-19 PROCEDURE — 86900 BLOOD TYPING SEROLOGIC ABO: CPT

## 2020-11-19 PROCEDURE — 86850 RBC ANTIBODY SCREEN: CPT

## 2020-11-19 PROCEDURE — 36415 COLL VENOUS BLD VENIPUNCTURE: CPT

## 2020-11-19 PROCEDURE — 86902 BLOOD TYPE ANTIGEN DONOR EA: CPT

## 2020-11-21 LAB — BACTERIA SPEC AEROBE CULT: NORMAL

## 2020-12-02 PROCEDURE — 86902 BLOOD TYPE ANTIGEN DONOR EA: CPT

## 2020-12-03 PROCEDURE — 88309 TISSUE EXAM BY PATHOLOGIST: CPT | Performed by: OBSTETRICS & GYNECOLOGY

## 2020-12-04 ENCOUNTER — LAB REQUISITION (OUTPATIENT)
Dept: LAB | Facility: HOSPITAL | Age: 45
End: 2020-12-04

## 2020-12-04 DIAGNOSIS — N39.3 STRESS INCONTINENCE (FEMALE) (MALE): ICD-10-CM

## 2020-12-04 LAB
BASOPHILS # BLD AUTO: 0.1 10*3/MM3 (ref 0–0.2)
BASOPHILS NFR BLD AUTO: 0.4 % (ref 0–1.5)
DEPRECATED RDW RBC AUTO: 41.6 FL (ref 37–54)
EOSINOPHIL # BLD AUTO: 0 10*3/MM3 (ref 0–0.4)
EOSINOPHIL NFR BLD AUTO: 0 % (ref 0.3–6.2)
ERYTHROCYTE [DISTWIDTH] IN BLOOD BY AUTOMATED COUNT: 14.4 % (ref 12.3–15.4)
HCT VFR BLD AUTO: 34.9 % (ref 34–46.6)
HGB BLD-MCNC: 11.3 G/DL (ref 12–15.9)
LYMPHOCYTES # BLD AUTO: 1.5 10*3/MM3 (ref 0.7–3.1)
LYMPHOCYTES NFR BLD AUTO: 7.5 % (ref 19.6–45.3)
MCH RBC QN AUTO: 26.5 PG (ref 26.6–33)
MCHC RBC AUTO-ENTMCNC: 32.4 G/DL (ref 31.5–35.7)
MCV RBC AUTO: 81.7 FL (ref 79–97)
MONOCYTES # BLD AUTO: 0.8 10*3/MM3 (ref 0.1–0.9)
MONOCYTES NFR BLD AUTO: 4 % (ref 5–12)
NEUTROPHILS NFR BLD AUTO: 17.5 10*3/MM3 (ref 1.7–7)
NEUTROPHILS NFR BLD AUTO: 88.1 % (ref 42.7–76)
NRBC BLD AUTO-RTO: 0 /100 WBC (ref 0–0.2)
PLATELET # BLD AUTO: 366 10*3/MM3 (ref 140–450)
PMV BLD AUTO: 9.6 FL (ref 6–12)
RBC # BLD AUTO: 4.28 10*6/MM3 (ref 3.77–5.28)
WBC # BLD AUTO: 19.9 10*3/MM3 (ref 3.4–10.8)

## 2020-12-04 PROCEDURE — 85025 COMPLETE CBC W/AUTO DIFF WBC: CPT | Performed by: OBSTETRICS & GYNECOLOGY

## 2020-12-09 LAB
LAB AP CASE REPORT: NORMAL
LAB AP SYNOPTIC CHECKLIST: NORMAL
PATH REPORT.FINAL DX SPEC: NORMAL
PATH REPORT.GROSS SPEC: NORMAL

## 2021-05-28 ENCOUNTER — HOSPITAL ENCOUNTER (OUTPATIENT)
Dept: CARDIOLOGY | Facility: HOSPITAL | Age: 46
Discharge: HOME | End: 2021-05-28
Admitting: PHYSICIAN ASSISTANT

## 2021-05-28 ENCOUNTER — OFFICE VISIT (OUTPATIENT)
Dept: FAMILY MEDICINE CLINIC | Facility: CLINIC | Age: 46
End: 2021-05-28

## 2021-05-28 VITALS
TEMPERATURE: 97.7 F | SYSTOLIC BLOOD PRESSURE: 157 MMHG | BODY MASS INDEX: 31.18 KG/M2 | OXYGEN SATURATION: 100 % | HEART RATE: 73 BPM | WEIGHT: 176 LBS | DIASTOLIC BLOOD PRESSURE: 91 MMHG

## 2021-05-28 DIAGNOSIS — M79.89 PAIN AND SWELLING OF LEFT LOWER LEG: Primary | ICD-10-CM

## 2021-05-28 DIAGNOSIS — R03.0 BLOOD PRESSURE ELEVATED WITHOUT HISTORY OF HTN: ICD-10-CM

## 2021-05-28 DIAGNOSIS — M79.662 PAIN AND SWELLING OF LEFT LOWER LEG: Primary | ICD-10-CM

## 2021-05-28 LAB
BH CV LOW VAS LEFT COMMON FEMORAL SPONT: 1
BH CV LOW VAS LEFT POPLITEAL SPONT: 1
BH CV LOW VAS LEFT PROXIMAL FEMORAL SPONT: 1
BH CV LOWER VASCULAR LEFT COMMON FEMORAL AUGMENT: NORMAL
BH CV LOWER VASCULAR LEFT COMMON FEMORAL COMPETENT: NORMAL
BH CV LOWER VASCULAR LEFT COMMON FEMORAL COMPRESS: NORMAL
BH CV LOWER VASCULAR LEFT COMMON FEMORAL PHASIC: NORMAL
BH CV LOWER VASCULAR LEFT COMMON FEMORAL SPONT: NORMAL
BH CV LOWER VASCULAR LEFT COMMON FEMORAL THROMBUS: NORMAL
BH CV LOWER VASCULAR LEFT DISTAL FEMORAL COMPRESS: NORMAL
BH CV LOWER VASCULAR LEFT GASTRONEMIUS COMPRESS: NORMAL
BH CV LOWER VASCULAR LEFT GREATER SAPH AK COMPRESS: NORMAL
BH CV LOWER VASCULAR LEFT GREATER SAPH BK COMPRESS: NORMAL
BH CV LOWER VASCULAR LEFT LESSER SAPH COMPRESS: NORMAL
BH CV LOWER VASCULAR LEFT MID FEMORAL AUGMENT: NORMAL
BH CV LOWER VASCULAR LEFT MID FEMORAL COMPETENT: NORMAL
BH CV LOWER VASCULAR LEFT MID FEMORAL COMPRESS: NORMAL
BH CV LOWER VASCULAR LEFT MID FEMORAL PHASIC: NORMAL
BH CV LOWER VASCULAR LEFT MID FEMORAL SPONT: NORMAL
BH CV LOWER VASCULAR LEFT PERONEAL COMPRESS: NORMAL
BH CV LOWER VASCULAR LEFT POPLITEAL AUGMENT: NORMAL
BH CV LOWER VASCULAR LEFT POPLITEAL COMPETENT: NORMAL
BH CV LOWER VASCULAR LEFT POPLITEAL COMPRESS: NORMAL
BH CV LOWER VASCULAR LEFT POPLITEAL PHASIC: NORMAL
BH CV LOWER VASCULAR LEFT POPLITEAL SPONT: NORMAL
BH CV LOWER VASCULAR LEFT POPLITEAL THROMBUS: NORMAL
BH CV LOWER VASCULAR LEFT POSTERIOR TIBIAL COMPRESS: NORMAL
BH CV LOWER VASCULAR LEFT PROXIMAL FEMORAL AUGMENT: NORMAL
BH CV LOWER VASCULAR LEFT PROXIMAL FEMORAL COMPETENT: NORMAL
BH CV LOWER VASCULAR LEFT PROXIMAL FEMORAL COMPRESS: NORMAL
BH CV LOWER VASCULAR LEFT PROXIMAL FEMORAL PHASIC: NORMAL
BH CV LOWER VASCULAR LEFT PROXIMAL FEMORAL SPONT: NORMAL
BH CV LOWER VASCULAR LEFT PROXIMAL FEMORAL THROMBUS: NORMAL
BH CV LOWER VASCULAR LEFT SAPHENOFEMORAL JUNCTION COMPRESS: NORMAL
BH CV LOWER VASCULAR LEFT VARICOSITY BK COMPRESS: NORMAL
BH CV LOWER VASCULAR RIGHT COMMON FEMORAL AUGMENT: NORMAL
BH CV LOWER VASCULAR RIGHT COMMON FEMORAL COMPETENT: NORMAL
BH CV LOWER VASCULAR RIGHT COMMON FEMORAL COMPRESS: NORMAL
BH CV LOWER VASCULAR RIGHT COMMON FEMORAL PHASIC: NORMAL
BH CV LOWER VASCULAR RIGHT COMMON FEMORAL SPONT: NORMAL
MAXIMAL PREDICTED HEART RATE: 175 BPM
STRESS TARGET HR: 149 BPM

## 2021-05-28 PROCEDURE — 99214 OFFICE O/P EST MOD 30 MIN: CPT | Performed by: PHYSICIAN ASSISTANT

## 2021-05-28 PROCEDURE — 93971 EXTREMITY STUDY: CPT

## 2021-05-28 NOTE — PROGRESS NOTES
Subjective   Eleanor Bonds is a 45 y.o. female.     Has had swelling in left lower leg for past 2.5 weeks and it has gradually become more painful.  She had full hysterectomy in December due to cervical cancer stage 1.  She didn't have to do any radiation or chemo.  She sees oncologist annually.  She had kidney stones after that and has had to have surgery to remove stones. Last surgery in March was to remove stents.  Hasn't smoked in 5 years.  She denies any chest pain or shortness of breath.  No swelling in right leg.       The following portions of the patient's history were reviewed and updated as appropriate: allergies, current medications, past family history, past medical history, past social history, past surgical history and problem list.  Past Medical History:   Diagnosis Date   • Cervical cancer (CMS/HCC)    • GERD (gastroesophageal reflux disease)    • Low back pain      Past Surgical History:   Procedure Laterality Date   • BACK SURGERY     • HYSTERECTOMY  12/2020     Family History   Problem Relation Age of Onset   • Diabetes Mother    • Hyperlipidemia Mother    • Hypertension Mother    • Arthritis Mother    • Heart disease Father    • Hyperlipidemia Father    • Hypertension Father    • COPD Father    • Emphysema Father      Social History     Socioeconomic History   • Marital status:      Spouse name: Not on file   • Number of children: Not on file   • Years of education: Not on file   • Highest education level: Not on file   Tobacco Use   • Smoking status: Former Smoker   • Smokeless tobacco: Never Used   Substance and Sexual Activity   • Alcohol use: Yes     Comment: occ   • Drug use: No   • Sexual activity: Yes         Current Outpatient Medications:   •  omeprazole (priLOSEC) 40 MG capsule, TAKE 1 CAPSULE BY MOUTH DAILY, Disp: 30 capsule, Rfl: 0    Review of Systems   Constitutional: Negative for activity change, appetite change, chills, diaphoresis, fatigue, fever, unexpected weight  gain and unexpected weight loss.   Eyes: Negative for visual disturbance.   Respiratory: Negative for chest tightness, shortness of breath and wheezing.    Cardiovascular: Positive for leg swelling. Negative for chest pain and palpitations.   Musculoskeletal: Positive for myalgias. Negative for gait problem.   Skin: Negative for rash.   Neurological: Negative for dizziness, weakness, light-headedness, numbness, headache and confusion.     /91   Pulse 73   Temp 97.7 °F (36.5 °C) (Temporal)   Wt 79.8 kg (176 lb)   SpO2 100%   BMI 31.18 kg/m²       Objective   Physical Exam  Vitals and nursing note reviewed.   Constitutional:       Appearance: Normal appearance.   Cardiovascular:      Rate and Rhythm: Normal rate and regular rhythm.      Pulses: Normal pulses.      Heart sounds: Normal heart sounds.   Pulmonary:      Effort: Pulmonary effort is normal.      Breath sounds: Normal breath sounds.   Musculoskeletal:      Right lower leg: No edema.      Left lower leg: Tenderness present. No deformity or lacerations. 2+ Pitting Edema present.      Comments: + Garrett's test left leg   Skin:     General: Skin is warm and dry.   Neurological:      Mental Status: She is alert and oriented to person, place, and time.   Psychiatric:         Mood and Affect: Mood normal.         Behavior: Behavior normal.         Thought Content: Thought content normal.         Procedures     Assessment/Plan   Diagnoses and all orders for this visit:    1. Pain and swelling of left lower leg (Primary)  -     Duplex Venous Lower Extremity - Left CAR    2. Blood pressure elevated without history of HTN          Pt to get STAT ultrasound of left leg to r/o DVT due to exam and hx of DVT 18 years ago.  Pt doesn't want to start blood pressure medication due to going too low when she was put on medication in the past.  Encouraged to work on low sodium diet and monitor blood pressure over the weekend.  She will let me know how it is doing early  next week and if still elevated, will try on very low dose medication.        I spent 30 minutes of patient care including reviewing pt's previous hx, reviewing current symptoms, performing exam, ordering tests, discussing treatment plan and completing my note.

## 2021-05-28 NOTE — PATIENT INSTRUCTIONS
Please get ultrasound of left lower leg done to rule out blood clot.  We will call you with results.  Please monitor blood pressure over the weekend and let me know how it is doing next week.

## 2021-05-31 DIAGNOSIS — I82.592 CHRONIC DEEP VEIN THROMBOSIS (DVT) OF OTHER VEIN OF LEFT LOWER EXTREMITY (HCC): Primary | ICD-10-CM

## 2021-07-20 ENCOUNTER — APPOINTMENT (OUTPATIENT)
Dept: CT IMAGING | Facility: HOSPITAL | Age: 46
End: 2021-07-20

## 2021-07-20 ENCOUNTER — HOSPITAL ENCOUNTER (EMERGENCY)
Facility: HOSPITAL | Age: 46
Discharge: HOME | End: 2021-07-20
Attending: EMERGENCY MEDICINE | Admitting: EMERGENCY MEDICINE

## 2021-07-20 VITALS
BODY MASS INDEX: 31.8 KG/M2 | HEART RATE: 60 BPM | HEIGHT: 63 IN | SYSTOLIC BLOOD PRESSURE: 150 MMHG | TEMPERATURE: 98.3 F | WEIGHT: 179.45 LBS | DIASTOLIC BLOOD PRESSURE: 83 MMHG | OXYGEN SATURATION: 100 % | RESPIRATION RATE: 15 BRPM

## 2021-07-20 DIAGNOSIS — M54.50 ACUTE LEFT-SIDED LOW BACK PAIN WITHOUT SCIATICA: Primary | ICD-10-CM

## 2021-07-20 LAB
ALBUMIN SERPL-MCNC: 4.1 G/DL (ref 3.5–5.2)
ALBUMIN/GLOB SERPL: 1.5 G/DL
ALP SERPL-CCNC: 74 U/L (ref 39–117)
ALT SERPL W P-5'-P-CCNC: 16 U/L (ref 1–33)
ANION GAP SERPL CALCULATED.3IONS-SCNC: 13 MMOL/L (ref 5–15)
AST SERPL-CCNC: 14 U/L (ref 1–32)
BASOPHILS # BLD AUTO: 0.1 10*3/MM3 (ref 0–0.2)
BASOPHILS NFR BLD AUTO: 0.4 % (ref 0–1.5)
BILIRUB SERPL-MCNC: 0.3 MG/DL (ref 0–1.2)
BILIRUB UR QL STRIP: NEGATIVE
BUN SERPL-MCNC: 8 MG/DL (ref 6–20)
BUN/CREAT SERPL: 10.3 (ref 7–25)
CALCIUM SPEC-SCNC: 8.6 MG/DL (ref 8.6–10.5)
CHLORIDE SERPL-SCNC: 103 MMOL/L (ref 98–107)
CLARITY UR: CLEAR
CO2 SERPL-SCNC: 22 MMOL/L (ref 22–29)
COLOR UR: YELLOW
CREAT SERPL-MCNC: 0.78 MG/DL (ref 0.57–1)
DEPRECATED RDW RBC AUTO: 42.9 FL (ref 37–54)
EOSINOPHIL # BLD AUTO: 0.2 10*3/MM3 (ref 0–0.4)
EOSINOPHIL NFR BLD AUTO: 1.4 % (ref 0.3–6.2)
ERYTHROCYTE [DISTWIDTH] IN BLOOD BY AUTOMATED COUNT: 14.4 % (ref 12.3–15.4)
GFR SERPL CREATININE-BSD FRML MDRD: 80 ML/MIN/1.73
GLOBULIN UR ELPH-MCNC: 2.8 GM/DL
GLUCOSE SERPL-MCNC: 107 MG/DL (ref 65–99)
GLUCOSE UR STRIP-MCNC: NEGATIVE MG/DL
HCT VFR BLD AUTO: 43.7 % (ref 34–46.6)
HGB BLD-MCNC: 14.9 G/DL (ref 12–15.9)
HGB UR QL STRIP.AUTO: NEGATIVE
KETONES UR QL STRIP: ABNORMAL
LEUKOCYTE ESTERASE UR QL STRIP.AUTO: NEGATIVE
LIPASE SERPL-CCNC: 26 U/L (ref 13–60)
LYMPHOCYTES # BLD AUTO: 2.5 10*3/MM3 (ref 0.7–3.1)
LYMPHOCYTES NFR BLD AUTO: 17.8 % (ref 19.6–45.3)
MCH RBC QN AUTO: 28.6 PG (ref 26.6–33)
MCHC RBC AUTO-ENTMCNC: 34.2 G/DL (ref 31.5–35.7)
MCV RBC AUTO: 83.8 FL (ref 79–97)
MONOCYTES # BLD AUTO: 0.7 10*3/MM3 (ref 0.1–0.9)
MONOCYTES NFR BLD AUTO: 5.2 % (ref 5–12)
NEUTROPHILS NFR BLD AUTO: 10.5 10*3/MM3 (ref 1.7–7)
NEUTROPHILS NFR BLD AUTO: 75.2 % (ref 42.7–76)
NITRITE UR QL STRIP: NEGATIVE
NRBC BLD AUTO-RTO: 0 /100 WBC (ref 0–0.2)
PH UR STRIP.AUTO: 7.5 [PH] (ref 5–8)
PLATELET # BLD AUTO: 374 10*3/MM3 (ref 140–450)
PMV BLD AUTO: 8.6 FL (ref 6–12)
POTASSIUM SERPL-SCNC: 4.1 MMOL/L (ref 3.5–5.2)
PROT SERPL-MCNC: 6.9 G/DL (ref 6–8.5)
PROT UR QL STRIP: NEGATIVE
RBC # BLD AUTO: 5.22 10*6/MM3 (ref 3.77–5.28)
SODIUM SERPL-SCNC: 138 MMOL/L (ref 136–145)
SP GR UR STRIP: 1.01 (ref 1–1.03)
UROBILINOGEN UR QL STRIP: ABNORMAL
WBC # BLD AUTO: 13.9 10*3/MM3 (ref 3.4–10.8)

## 2021-07-20 PROCEDURE — 99283 EMERGENCY DEPT VISIT LOW MDM: CPT

## 2021-07-20 PROCEDURE — 96375 TX/PRO/DX INJ NEW DRUG ADDON: CPT

## 2021-07-20 PROCEDURE — 80053 COMPREHEN METABOLIC PANEL: CPT | Performed by: EMERGENCY MEDICINE

## 2021-07-20 PROCEDURE — 74176 CT ABD & PELVIS W/O CONTRAST: CPT

## 2021-07-20 PROCEDURE — 83690 ASSAY OF LIPASE: CPT | Performed by: EMERGENCY MEDICINE

## 2021-07-20 PROCEDURE — 85025 COMPLETE CBC W/AUTO DIFF WBC: CPT | Performed by: EMERGENCY MEDICINE

## 2021-07-20 PROCEDURE — 25010000002 ONDANSETRON PER 1 MG: Performed by: EMERGENCY MEDICINE

## 2021-07-20 PROCEDURE — 96374 THER/PROPH/DIAG INJ IV PUSH: CPT

## 2021-07-20 PROCEDURE — 25010000002 MORPHINE PER 10 MG: Performed by: EMERGENCY MEDICINE

## 2021-07-20 PROCEDURE — 81003 URINALYSIS AUTO W/O SCOPE: CPT | Performed by: EMERGENCY MEDICINE

## 2021-07-20 RX ORDER — MORPHINE SULFATE 4 MG/ML
4 INJECTION, SOLUTION INTRAMUSCULAR; INTRAVENOUS ONCE
Status: COMPLETED | OUTPATIENT
Start: 2021-07-20 | End: 2021-07-20

## 2021-07-20 RX ORDER — SODIUM CHLORIDE 0.9 % (FLUSH) 0.9 %
10 SYRINGE (ML) INJECTION AS NEEDED
Status: DISCONTINUED | OUTPATIENT
Start: 2021-07-20 | End: 2021-07-20 | Stop reason: HOSPADM

## 2021-07-20 RX ORDER — HYDROCODONE BITARTRATE AND ACETAMINOPHEN 5; 325 MG/1; MG/1
1 TABLET ORAL EVERY 6 HOURS PRN
Qty: 12 TABLET | Refills: 0 | Status: SHIPPED | OUTPATIENT
Start: 2021-07-20 | End: 2021-08-08

## 2021-07-20 RX ORDER — ONDANSETRON 2 MG/ML
4 INJECTION INTRAMUSCULAR; INTRAVENOUS ONCE
Status: COMPLETED | OUTPATIENT
Start: 2021-07-20 | End: 2021-07-20

## 2021-07-20 RX ORDER — CYCLOBENZAPRINE HCL 10 MG
10 TABLET ORAL 3 TIMES DAILY PRN
Qty: 15 TABLET | Refills: 0 | Status: SHIPPED | OUTPATIENT
Start: 2021-07-20 | End: 2021-07-27 | Stop reason: SDUPTHER

## 2021-07-20 RX ADMIN — MORPHINE SULFATE 4 MG: 4 INJECTION INTRAVENOUS at 12:28

## 2021-07-20 RX ADMIN — ONDANSETRON 4 MG: 2 INJECTION INTRAMUSCULAR; INTRAVENOUS at 12:28

## 2021-07-20 RX ADMIN — SODIUM CHLORIDE 1000 ML: 9 INJECTION, SOLUTION INTRAVENOUS at 12:23

## 2021-07-20 NOTE — DISCHARGE INSTRUCTIONS
Follow-up as directed above.  He has some tiny little renal stones and some gallstones.  But otherwise kidney looked okay.  He has a disc protrusion at L3-L4 please follow-up with the spine back doctor as listed above.  Return for fever redness swelling rash vomiting blood black or bloody stool uncontrolled pain or any other new or worse problems or concerns.  Norco and Flexeril sent to your pharmacy  Heating pad Salonpas patches

## 2021-07-20 NOTE — ED PROVIDER NOTES
Subjective   Chief complaint left back pain    History of present illness a 45-year-old female with 3-week history of pain in her left flank nonradiating continuous severe in nature.  Worse with movement better with rest.  Denies any abdominal pain no urinary symptoms no chest pain neck arm jaw pain or shortness of breath.  No recent long car ride plane or immobilization no leg pain or swelling.  No associated fever chills weight loss or night sweats.  No black or bloody stool.  No other associated symptoms.          Review of Systems   Constitutional: Negative for chills and fever.   HENT: Negative for congestion and sinus pressure.    Eyes: Negative for photophobia and visual disturbance.   Respiratory: Negative for chest tightness and shortness of breath.    Cardiovascular: Negative for chest pain and leg swelling.   Gastrointestinal: Positive for constipation and nausea. Negative for abdominal pain and vomiting.   Endocrine: Negative for cold intolerance and heat intolerance.   Genitourinary: Positive for flank pain. Negative for difficulty urinating and dysuria.   Musculoskeletal: Positive for back pain. Negative for arthralgias.   Skin: Negative for color change and rash.   Neurological: Negative for dizziness and light-headedness.   Psychiatric/Behavioral: Negative for agitation and behavioral problems.       Past Medical History:   Diagnosis Date   • Cervical cancer (CMS/HCC)    • GERD (gastroesophageal reflux disease)    • Low back pain        Allergies   Allergen Reactions   • Aspirin GI Intolerance       Past Surgical History:   Procedure Laterality Date   • BACK SURGERY     • HYSTERECTOMY  12/2020       Family History   Problem Relation Age of Onset   • Diabetes Mother    • Hyperlipidemia Mother    • Hypertension Mother    • Arthritis Mother    • Heart disease Father    • Hyperlipidemia Father    • Hypertension Father    • COPD Father    • Emphysema Father        Social History     Socioeconomic History    • Marital status:      Spouse name: Not on file   • Number of children: Not on file   • Years of education: Not on file   • Highest education level: Not on file   Tobacco Use   • Smoking status: Former Smoker   • Smokeless tobacco: Never Used   Substance and Sexual Activity   • Alcohol use: Yes     Comment: occ   • Drug use: No   • Sexual activity: Yes     Prior to Admission medications    Medication Sig Start Date End Date Taking? Authorizing Provider   omeprazole (priLOSEC) 40 MG capsule TAKE 1 CAPSULE BY MOUTH DAILY 9/8/19   Maryam Houser MD           Objective   Physical Exam  45-year-old female awake alert no acute distress.  HEENT extraocular muscles are intact pupils equal react sclera clear mouth clear  neck supple no adenopathy no meningeal signs no JVD  lungs clear no retraction  heart regular without murmur  abdomen soft nontender good bowel sounds no peritoneal findings or pulsatile mass or bruits.  Extremities pulses are equal throughout upper and lower extremities no edema cords or Homans' sign or evidence of DVT.  Skin warm dry without rashes.  No cellulitic changes  back no acute cervical rest lumbar spine tenderness palpation percussion she has tenderness to the left paralumbar area and left CVA area there is no crepitus or subcutaneous air there is no redness or rash or signs of infection.  Neck straight leg testing toes up down including big toes dorsiflex plantarflex that difficulty.  No saddle anesthesia.  Reflexes 2+ symmetrical throughout knees and ankles.  No weakness in extremities.  Negative straight leg testing and negative cross leg straight testing.  Pain is positional and hurts to move and sit up.  Seems to be somewhat reproducible.  Neurologic awake alert orientated x4 no facial asymmetry normal speech without focal deficits  Procedures           ED Course      Results for orders placed or performed during the hospital encounter of 07/20/21   Comprehensive  Metabolic Panel    Specimen: Blood   Result Value Ref Range    Glucose 107 (H) 65 - 99 mg/dL    BUN 8 6 - 20 mg/dL    Creatinine 0.78 0.57 - 1.00 mg/dL    Sodium 138 136 - 145 mmol/L    Potassium 4.1 3.5 - 5.2 mmol/L    Chloride 103 98 - 107 mmol/L    CO2 22.0 22.0 - 29.0 mmol/L    Calcium 8.6 8.6 - 10.5 mg/dL    Total Protein 6.9 6.0 - 8.5 g/dL    Albumin 4.10 3.50 - 5.20 g/dL    ALT (SGPT) 16 1 - 33 U/L    AST (SGOT) 14 1 - 32 U/L    Alkaline Phosphatase 74 39 - 117 U/L    Total Bilirubin 0.3 0.0 - 1.2 mg/dL    eGFR Non African Amer 80 >60 mL/min/1.73    Globulin 2.8 gm/dL    A/G Ratio 1.5 g/dL    BUN/Creatinine Ratio 10.3 7.0 - 25.0    Anion Gap 13.0 5.0 - 15.0 mmol/L   Lipase    Specimen: Blood   Result Value Ref Range    Lipase 26 13 - 60 U/L   Urinalysis With Microscopic If Indicated (No Culture) - Urine, Clean Catch    Specimen: Urine, Clean Catch   Result Value Ref Range    Color, UA Yellow Yellow, Straw    Appearance, UA Clear Clear    pH, UA 7.5 5.0 - 8.0    Specific Gravity, UA 1.014 1.005 - 1.030    Glucose, UA Negative Negative    Ketones, UA Trace (A) Negative    Bilirubin, UA Negative Negative    Blood, UA Negative Negative    Protein, UA Negative Negative    Leuk Esterase, UA Negative Negative    Nitrite, UA Negative Negative    Urobilinogen, UA 0.2 E.U./dL 0.2 - 1.0 E.U./dL   CBC Auto Differential    Specimen: Blood   Result Value Ref Range    WBC 13.90 (H) 3.40 - 10.80 10*3/mm3    RBC 5.22 3.77 - 5.28 10*6/mm3    Hemoglobin 14.9 12.0 - 15.9 g/dL    Hematocrit 43.7 34.0 - 46.6 %    MCV 83.8 79.0 - 97.0 fL    MCH 28.6 26.6 - 33.0 pg    MCHC 34.2 31.5 - 35.7 g/dL    RDW 14.4 12.3 - 15.4 %    RDW-SD 42.9 37.0 - 54.0 fl    MPV 8.6 6.0 - 12.0 fL    Platelets 374 140 - 450 10*3/mm3    Neutrophil % 75.2 42.7 - 76.0 %    Lymphocyte % 17.8 (L) 19.6 - 45.3 %    Monocyte % 5.2 5.0 - 12.0 %    Eosinophil % 1.4 0.3 - 6.2 %    Basophil % 0.4 0.0 - 1.5 %    Neutrophils, Absolute 10.50 (H) 1.70 - 7.00 10*3/mm3     Lymphocytes, Absolute 2.50 0.70 - 3.10 10*3/mm3    Monocytes, Absolute 0.70 0.10 - 0.90 10*3/mm3    Eosinophils, Absolute 0.20 0.00 - 0.40 10*3/mm3    Basophils, Absolute 0.10 0.00 - 0.20 10*3/mm3    nRBC 0.0 0.0 - 0.2 /100 WBC     CT Abdomen Pelvis Stone Protocol    Result Date: 7/20/2021   1.  Punctate bilateral nonobstructing renal stones.  No evidence of ureteral stone or hydronephrosis. 2.  Cholelithiasis but no CT evidence of acute cholecystitis or biliary tract obstruction. 3.  Interval hysterectomy.  Electronically Signed By-Geo Fishman MD On:7/20/2021 2:45 PM This report was finalized on [id number] by  Geo Fishman MD.    Medications   sodium chloride 0.9 % bolus 1,000 mL (0 mL Intravenous Stopped 7/20/21 1253)   Morphine sulfate (PF) injection 4 mg (4 mg Intravenous Given 7/20/21 1228)   ondansetron (ZOFRAN) injection 4 mg (4 mg Intravenous Given 7/20/21 1228)                                            MDM  Number of Diagnoses or Management Options  Acute left-sided low back pain without sciatica: new and requires workup  Diagnosis management comments: Medical decision making.  Patient IV established given a liter saline morphine 4 IV for Zofran IV and had the above evaluation.  BC white count was 13.9 hemoglobin 14.9 urine lipase liver enzymes chemistries unremarkable gentleman CT scan showed punctate bilateral nonobstructing renal stones no evidence of a urethral stone or hydronephrosis patient had cholelithiasis but no CT evidence of acute cholecystitis or biliary tract obstruction and patient's had interval hysterectomy since last CT scan.  On repeat exam patient was resting comfortably she has a slightly elevated white count not sure where that is coming from at this point she has had symptoms for about 3 weeks this is reproducible seems to be musculoskeletal in nature CT scan does show some evidence of an L3-L4 disc which may be responsible for symptoms my clinical exam does not suggest any  type of epidural abscess no fever chills or night sweats.  No other risk factors for this.  He has had some previous back surgery..  I do not see any pneumonia I see no intra-abdominal process no UTI.  The patient has no saddle anesthesias she has no weakness in extremities reflexes are all intact motor strength normal no evidence of cord compression or cauda equina.  We talked about signs and symptoms of these and what to return for.  She voiced understanding she will follow-up as referred to outpatient back doctor and family doctor.  Inspect reviewed by my pharmacy staff which was otherwise unremarkable stable unremarkable improved ER course.  We did talk about this and she referred back to her primary care doctor and may need outpatient MRI to further delineate this out.       Amount and/or Complexity of Data Reviewed  Clinical lab tests: reviewed  Tests in the radiology section of CPT®: reviewed    Risk of Complications, Morbidity, and/or Mortality  Presenting problems: high  Diagnostic procedures: high  Management options: high    Patient Progress  Patient progress: stable      Final diagnoses:   Acute left-sided low back pain without sciatica       ED Disposition  ED Disposition     ED Disposition Condition Comment    Discharge Stable           Lizz Moraes, APRN  [address] [facility]  [address]  [place] [address]  [phone]    In 1 day      Zachariah Graham MD  [address] [facility]  [address]  [place] [address]  [phone]    In 1 week           Medication List      New Prescriptions    cyclobenzaprine 10 MG tablet  Commonly known as: FLEXERIL  Take 1 tablet by mouth 3 (Three) Times a Day As Needed for Muscle Spasms.     HYDROcodone-acetaminophen 5-325 MG per tablet  Commonly known as: NORCO  Take 1 tablet by mouth Every 6 (Six) Hours As Needed for Severe Pain .           Where to Get Your Medications      These medications were sent to [id number] - [place], [place] - [address]  AT [facility] [address]  [place] - [phone]  - [phone] FX  [address] [place] [address]    Phone: [phone]   · cyclobenzaprine 10 MG tablet  · HYDROcodone-acetaminophen 5-325 MG per tablet          Javi Corley MD  07/21/21 0017       Jaiv Corley MD  07/21/21 0017

## 2021-07-20 NOTE — ED NOTES
Pt reports left side pain x3 weeks, worse today. Pt has an hx of kidney stones and stent placement.     Susan Burton, RN  07/20/21 2005

## 2021-07-23 ENCOUNTER — OFFICE VISIT (OUTPATIENT)
Dept: FAMILY MEDICINE CLINIC | Facility: CLINIC | Age: 46
End: 2021-07-23

## 2021-07-23 VITALS
HEART RATE: 81 BPM | OXYGEN SATURATION: 99 % | TEMPERATURE: 96.8 F | DIASTOLIC BLOOD PRESSURE: 85 MMHG | SYSTOLIC BLOOD PRESSURE: 123 MMHG | WEIGHT: 179 LBS | BODY MASS INDEX: 31.71 KG/M2

## 2021-07-23 DIAGNOSIS — M54.42 ACUTE LEFT-SIDED LOW BACK PAIN WITH LEFT-SIDED SCIATICA: Primary | ICD-10-CM

## 2021-07-23 PROCEDURE — 99214 OFFICE O/P EST MOD 30 MIN: CPT | Performed by: NURSE PRACTITIONER

## 2021-07-23 RX ORDER — METHYLPREDNISOLONE 4 MG/1
TABLET ORAL
Qty: 1 EACH | Refills: 0 | Status: SHIPPED | OUTPATIENT
Start: 2021-07-23 | End: 2021-08-08

## 2021-07-23 NOTE — PROGRESS NOTES
Subjective   Eleanor Bonds is a 45 y.o. female.     Patient is here today for [facility] ER follow-up.  She went to the hospital on July 20 with complaints of left flank pain.  She reports that it was continuous and severe.  Symptoms have been persistent for 3 weeks at that time.  She had a CT stone protocol completed which showed nonobstructing stones and no hydronephrosis.  It did show some degenerative disc disease at L3-L4.  She was given morphine and Zofran, which helped with her symptoms.  White blood cell count was mildly elevated with an unknown cause.  She was sent home and instructed to follow-up with PCP for possible MRI of spine.  She has an appointment with spine surgery in mid august.  Pain is in left lower back and radiates into the left leg and buttocks.   Rates the pain 7/10.  Worse with movement.  She has had a lumbar discectomy on L4-5 previously- 8 yrs ago       The following portions of the patient's history were reviewed and updated as appropriate: allergies, current medications, past family history, past medical history, past social history, past surgical history and problem list.    Review of Systems   Constitutional: Negative for chills, fatigue and fever.   Respiratory: Negative for chest tightness and shortness of breath.    Cardiovascular: Negative for chest pain and palpitations.   Genitourinary: Negative for frequency and urgency.   Musculoskeletal: Positive for arthralgias and back pain.   Neurological: Positive for weakness and numbness. Negative for dizziness and headache.       Objective   /85 (BP Location: Left arm, Patient Position: Sitting, Cuff Size: Adult)   Pulse 81   Temp 96.8 °F (36 °C) (Tympanic)   Wt 81.2 kg (179 lb)   LMP 03/03/2020   SpO2 99%   BMI 31.71 kg/m²   Physical Exam  Constitutional:       Appearance: Normal appearance.   HENT:      Head: Normocephalic and atraumatic.   Pulmonary:      Effort: Pulmonary effort is normal. No respiratory  distress.   Musculoskeletal:         General: Tenderness (lower back) present.   Skin:     General: Skin is warm and dry.   Neurological:      General: No focal deficit present.      Mental Status: She is alert and oriented to person, place, and time.   Psychiatric:         Mood and Affect: Mood normal.         Behavior: Behavior normal.         Thought Content: Thought content normal.         Judgment: Judgment normal.           Assessment/Plan     Diagnoses and all orders for this visit:    1. Acute left-sided low back pain with left-sided sciatica (Primary)  Comments:  CT- partially calcified disc protrusion at the L3/4 level resulting in at  least moderate spinal canal stenosis  tx with medrol  flexeril  sees spine in Aug  PT  MRI spine ordered  Orders:  -     MRI Lumbar Spine Without Contrast; Future  -     methylPREDNISolone (MEDROL) 4 MG dose pack; Take as directed on package instructions.  Dispense: 1 each; Refill: 0  -     Ambulatory Referral to Physical Therapy Evaluate and treat

## 2021-07-27 ENCOUNTER — APPOINTMENT (OUTPATIENT)
Dept: VASCULAR SURGERY | Facility: HOSPITAL | Age: 46
End: 2021-07-27

## 2021-07-27 PROCEDURE — G0463 HOSPITAL OUTPT CLINIC VISIT: HCPCS

## 2021-07-27 RX ORDER — CYCLOBENZAPRINE HCL 10 MG
10 TABLET ORAL 3 TIMES DAILY PRN
Qty: 30 TABLET | Refills: 0 | Status: SHIPPED | OUTPATIENT
Start: 2021-07-27 | End: 2021-12-29

## 2021-08-08 ENCOUNTER — APPOINTMENT (OUTPATIENT)
Dept: CT IMAGING | Facility: HOSPITAL | Age: 46
End: 2021-08-08

## 2021-08-08 ENCOUNTER — HOSPITAL ENCOUNTER (OUTPATIENT)
Facility: HOSPITAL | Age: 46
Discharge: HOME | End: 2021-08-09
Attending: INTERNAL MEDICINE | Admitting: UROLOGY

## 2021-08-08 ENCOUNTER — ANESTHESIA EVENT (OUTPATIENT)
Dept: PERIOP | Facility: HOSPITAL | Age: 46
End: 2021-08-08

## 2021-08-08 ENCOUNTER — ANESTHESIA (OUTPATIENT)
Dept: PERIOP | Facility: HOSPITAL | Age: 46
End: 2021-08-08

## 2021-08-08 DIAGNOSIS — N20.1 RIGHT URETERAL STONE: ICD-10-CM

## 2021-08-08 DIAGNOSIS — D72.829 LEUKOCYTOSIS, UNSPECIFIED TYPE: ICD-10-CM

## 2021-08-08 DIAGNOSIS — N39.0 URINARY TRACT INFECTION WITH HEMATURIA, SITE UNSPECIFIED: ICD-10-CM

## 2021-08-08 DIAGNOSIS — R31.9 URINARY TRACT INFECTION WITH HEMATURIA, SITE UNSPECIFIED: ICD-10-CM

## 2021-08-08 DIAGNOSIS — Q62.11 HYDRONEPHROSIS WITH URETEROPELVIC JUNCTION (UPJ) OBSTRUCTION: Primary | ICD-10-CM

## 2021-08-08 LAB
ALBUMIN SERPL-MCNC: 4.5 G/DL (ref 3.5–5.2)
ALBUMIN/GLOB SERPL: 1.3 G/DL
ALP SERPL-CCNC: 93 U/L (ref 39–117)
ALT SERPL W P-5'-P-CCNC: 29 U/L (ref 1–33)
ANION GAP SERPL CALCULATED.3IONS-SCNC: 21 MMOL/L (ref 5–15)
AST SERPL-CCNC: 21 U/L (ref 1–32)
BACTERIA UR QL AUTO: ABNORMAL /HPF
BASOPHILS # BLD AUTO: 0.1 10*3/MM3 (ref 0–0.2)
BASOPHILS NFR BLD AUTO: 0.5 % (ref 0–1.5)
BILIRUB SERPL-MCNC: 1 MG/DL (ref 0–1.2)
BILIRUB UR QL STRIP: NEGATIVE
BUN SERPL-MCNC: 11 MG/DL (ref 6–20)
BUN/CREAT SERPL: 11.1 (ref 7–25)
CALCIUM SPEC-SCNC: 9.4 MG/DL (ref 8.6–10.5)
CHLORIDE SERPL-SCNC: 97 MMOL/L (ref 98–107)
CLARITY UR: ABNORMAL
CO2 SERPL-SCNC: 18 MMOL/L (ref 22–29)
COLOR UR: ABNORMAL
CREAT SERPL-MCNC: 0.99 MG/DL (ref 0.57–1)
DEPRECATED RDW RBC AUTO: 40.7 FL (ref 37–54)
EOSINOPHIL # BLD AUTO: 0 10*3/MM3 (ref 0–0.4)
EOSINOPHIL NFR BLD AUTO: 0 % (ref 0.3–6.2)
ERYTHROCYTE [DISTWIDTH] IN BLOOD BY AUTOMATED COUNT: 13.9 % (ref 12.3–15.4)
GFR SERPL CREATININE-BSD FRML MDRD: 61 ML/MIN/1.73
GLOBULIN UR ELPH-MCNC: 3.4 GM/DL
GLUCOSE SERPL-MCNC: 166 MG/DL (ref 65–99)
GLUCOSE UR STRIP-MCNC: NEGATIVE MG/DL
GRAN CASTS URNS QL MICRO: ABNORMAL /LPF
HCT VFR BLD AUTO: 43.8 % (ref 34–46.6)
HGB BLD-MCNC: 15 G/DL (ref 12–15.9)
HGB UR QL STRIP.AUTO: ABNORMAL
HOLD SPECIMEN: NORMAL
HOLD SPECIMEN: NORMAL
HYALINE CASTS UR QL AUTO: ABNORMAL /LPF
KETONES UR QL STRIP: ABNORMAL
LEUKOCYTE ESTERASE UR QL STRIP.AUTO: ABNORMAL
LIPASE SERPL-CCNC: 12 U/L (ref 13–60)
LYMPHOCYTES # BLD AUTO: 1.9 10*3/MM3 (ref 0.7–3.1)
LYMPHOCYTES NFR BLD AUTO: 8.9 % (ref 19.6–45.3)
MAGNESIUM SERPL-MCNC: 1.5 MG/DL (ref 1.6–2.6)
MCH RBC QN AUTO: 28.2 PG (ref 26.6–33)
MCHC RBC AUTO-ENTMCNC: 34.3 G/DL (ref 31.5–35.7)
MCV RBC AUTO: 82.3 FL (ref 79–97)
MONOCYTES # BLD AUTO: 0.9 10*3/MM3 (ref 0.1–0.9)
MONOCYTES NFR BLD AUTO: 4.2 % (ref 5–12)
NEUTROPHILS NFR BLD AUTO: 18.2 10*3/MM3 (ref 1.7–7)
NEUTROPHILS NFR BLD AUTO: 86.4 % (ref 42.7–76)
NITRITE UR QL STRIP: NEGATIVE
NRBC BLD AUTO-RTO: 0.1 /100 WBC (ref 0–0.2)
PH UR STRIP.AUTO: 5.5 [PH] (ref 5–8)
PLATELET # BLD AUTO: 483 10*3/MM3 (ref 140–450)
PMV BLD AUTO: 8.4 FL (ref 6–12)
POTASSIUM SERPL-SCNC: 3.2 MMOL/L (ref 3.5–5.2)
PROT SERPL-MCNC: 7.9 G/DL (ref 6–8.5)
PROT UR QL STRIP: ABNORMAL
RBC # BLD AUTO: 5.32 10*6/MM3 (ref 3.77–5.28)
RBC # UR: ABNORMAL /HPF
REF LAB TEST METHOD: ABNORMAL
SARS-COV-2 RNA PNL SPEC NAA+PROBE: NOT DETECTED
SODIUM SERPL-SCNC: 136 MMOL/L (ref 136–145)
SP GR UR STRIP: 1.03 (ref 1–1.03)
SQUAMOUS #/AREA URNS HPF: ABNORMAL /HPF
UROBILINOGEN UR QL STRIP: ABNORMAL
WBC # BLD AUTO: 21 10*3/MM3 (ref 3.4–10.8)
WBC UR QL AUTO: ABNORMAL /HPF

## 2021-08-08 PROCEDURE — 63710000001 ONDANSETRON PER 8 MG: Performed by: INTERNAL MEDICINE

## 2021-08-08 PROCEDURE — 25010000002 PROPOFOL 10 MG/ML EMULSION: Performed by: ANESTHESIOLOGY

## 2021-08-08 PROCEDURE — 0 IOHEXOL 300 MG/ML SOLUTION: Performed by: UROLOGY

## 2021-08-08 PROCEDURE — G0378 HOSPITAL OBSERVATION PER HR: HCPCS

## 2021-08-08 PROCEDURE — 25010000002 CEFTRIAXONE PER 250 MG: Performed by: PHYSICIAN ASSISTANT

## 2021-08-08 PROCEDURE — 25010000002 KETOROLAC TROMETHAMINE PER 15 MG: Performed by: PHYSICIAN ASSISTANT

## 2021-08-08 PROCEDURE — P9612 CATHETERIZE FOR URINE SPEC: HCPCS

## 2021-08-08 PROCEDURE — 87635 SARS-COV-2 COVID-19 AMP PRB: CPT | Performed by: PHYSICIAN ASSISTANT

## 2021-08-08 PROCEDURE — 25010000002 DEXAMETHASONE PER 1 MG: Performed by: ANESTHESIOLOGY

## 2021-08-08 PROCEDURE — 25010000002 FENTANYL CITRATE (PF) 50 MCG/ML SOLUTION: Performed by: ANESTHESIOLOGY

## 2021-08-08 PROCEDURE — C1758 CATHETER, URETERAL: HCPCS | Performed by: UROLOGY

## 2021-08-08 PROCEDURE — 25010000002 MORPHINE PER 10 MG: Performed by: UROLOGY

## 2021-08-08 PROCEDURE — 25010000002 FENTANYL CITRATE (PF) 100 MCG/2ML SOLUTION: Performed by: ANESTHESIOLOGY

## 2021-08-08 PROCEDURE — 74176 CT ABD & PELVIS W/O CONTRAST: CPT

## 2021-08-08 PROCEDURE — 87086 URINE CULTURE/COLONY COUNT: CPT | Performed by: PHYSICIAN ASSISTANT

## 2021-08-08 PROCEDURE — C9803 HOPD COVID-19 SPEC COLLECT: HCPCS

## 2021-08-08 PROCEDURE — 96374 THER/PROPH/DIAG INJ IV PUSH: CPT

## 2021-08-08 PROCEDURE — 85025 COMPLETE CBC W/AUTO DIFF WBC: CPT | Performed by: PHYSICIAN ASSISTANT

## 2021-08-08 PROCEDURE — 99220 PR INITIAL OBSERVATION CARE/DAY 70 MINUTES: CPT | Performed by: INTERNAL MEDICINE

## 2021-08-08 PROCEDURE — 96361 HYDRATE IV INFUSION ADD-ON: CPT

## 2021-08-08 PROCEDURE — C1769 GUIDE WIRE: HCPCS | Performed by: UROLOGY

## 2021-08-08 PROCEDURE — 99284 EMERGENCY DEPT VISIT MOD MDM: CPT

## 2021-08-08 PROCEDURE — 80053 COMPREHEN METABOLIC PANEL: CPT | Performed by: PHYSICIAN ASSISTANT

## 2021-08-08 PROCEDURE — 83690 ASSAY OF LIPASE: CPT | Performed by: PHYSICIAN ASSISTANT

## 2021-08-08 PROCEDURE — 96375 TX/PRO/DX INJ NEW DRUG ADDON: CPT

## 2021-08-08 PROCEDURE — 83735 ASSAY OF MAGNESIUM: CPT | Performed by: INTERNAL MEDICINE

## 2021-08-08 PROCEDURE — 81001 URINALYSIS AUTO W/SCOPE: CPT | Performed by: PHYSICIAN ASSISTANT

## 2021-08-08 PROCEDURE — 82365 CALCULUS SPECTROSCOPY: CPT | Performed by: UROLOGY

## 2021-08-08 PROCEDURE — 25010000002 ONDANSETRON PER 1 MG: Performed by: PHYSICIAN ASSISTANT

## 2021-08-08 PROCEDURE — 25010000002 ONDANSETRON PER 1 MG: Performed by: ANESTHESIOLOGY

## 2021-08-08 PROCEDURE — 25010000002 MIDAZOLAM PER 1 MG: Performed by: ANESTHESIOLOGY

## 2021-08-08 PROCEDURE — C2617 STENT, NON-COR, TEM W/O DEL: HCPCS | Performed by: UROLOGY

## 2021-08-08 PROCEDURE — 25010000002 HYDROMORPHONE PER 4 MG: Performed by: PHYSICIAN ASSISTANT

## 2021-08-08 DEVICE — VARIABLE LENGTH URETERAL STENT
Type: IMPLANTABLE DEVICE | Site: URETER | Status: FUNCTIONAL
Brand: CONTOUR VL™

## 2021-08-08 RX ORDER — SODIUM CHLORIDE 0.9 % (FLUSH) 0.9 %
3-10 SYRINGE (ML) INJECTION AS NEEDED
Status: DISCONTINUED | OUTPATIENT
Start: 2021-08-08 | End: 2021-08-09 | Stop reason: HOSPADM

## 2021-08-08 RX ORDER — MAGNESIUM SULFATE HEPTAHYDRATE 40 MG/ML
2 INJECTION, SOLUTION INTRAVENOUS AS NEEDED
Status: DISCONTINUED | OUTPATIENT
Start: 2021-08-08 | End: 2021-08-09 | Stop reason: HOSPADM

## 2021-08-08 RX ORDER — ACETAMINOPHEN 650 MG/1
650 SUPPOSITORY RECTAL EVERY 4 HOURS PRN
Status: DISCONTINUED | OUTPATIENT
Start: 2021-08-08 | End: 2021-08-09 | Stop reason: HOSPADM

## 2021-08-08 RX ORDER — ONDANSETRON 2 MG/ML
4 INJECTION INTRAMUSCULAR; INTRAVENOUS EVERY 6 HOURS PRN
Status: DISCONTINUED | OUTPATIENT
Start: 2021-08-08 | End: 2021-08-09 | Stop reason: HOSPADM

## 2021-08-08 RX ORDER — PHENAZOPYRIDINE HYDROCHLORIDE 200 MG/1
200 TABLET, FILM COATED ORAL
Status: DISCONTINUED | OUTPATIENT
Start: 2021-08-08 | End: 2021-08-09 | Stop reason: HOSPADM

## 2021-08-08 RX ORDER — SODIUM CHLORIDE 0.9 % (FLUSH) 0.9 %
3 SYRINGE (ML) INJECTION EVERY 12 HOURS SCHEDULED
Status: DISCONTINUED | OUTPATIENT
Start: 2021-08-08 | End: 2021-08-09 | Stop reason: HOSPADM

## 2021-08-08 RX ORDER — SODIUM CHLORIDE 0.9 % (FLUSH) 0.9 %
10 SYRINGE (ML) INJECTION AS NEEDED
Status: DISCONTINUED | OUTPATIENT
Start: 2021-08-08 | End: 2021-08-09 | Stop reason: HOSPADM

## 2021-08-08 RX ORDER — ONDANSETRON 2 MG/ML
INJECTION INTRAMUSCULAR; INTRAVENOUS AS NEEDED
Status: DISCONTINUED | OUTPATIENT
Start: 2021-08-08 | End: 2021-08-08 | Stop reason: SURG

## 2021-08-08 RX ORDER — CHOLECALCIFEROL (VITAMIN D3) 125 MCG
5 CAPSULE ORAL NIGHTLY PRN
Status: DISCONTINUED | OUTPATIENT
Start: 2021-08-08 | End: 2021-08-09 | Stop reason: HOSPADM

## 2021-08-08 RX ORDER — ONDANSETRON 4 MG/1
4 TABLET, FILM COATED ORAL EVERY 6 HOURS PRN
Status: DISCONTINUED | OUTPATIENT
Start: 2021-08-08 | End: 2021-08-09 | Stop reason: HOSPADM

## 2021-08-08 RX ORDER — FENTANYL CITRATE 50 UG/ML
50 INJECTION, SOLUTION INTRAMUSCULAR; INTRAVENOUS
Status: DISCONTINUED | OUTPATIENT
Start: 2021-08-08 | End: 2021-08-08 | Stop reason: HOSPADM

## 2021-08-08 RX ORDER — ACETAMINOPHEN 160 MG/5ML
650 SOLUTION ORAL EVERY 4 HOURS PRN
Status: DISCONTINUED | OUTPATIENT
Start: 2021-08-08 | End: 2021-08-09 | Stop reason: HOSPADM

## 2021-08-08 RX ORDER — ONDANSETRON 2 MG/ML
4 INJECTION INTRAMUSCULAR; INTRAVENOUS EVERY 4 HOURS PRN
Status: DISCONTINUED | OUTPATIENT
Start: 2021-08-08 | End: 2021-08-08

## 2021-08-08 RX ORDER — ONDANSETRON 2 MG/ML
4 INJECTION INTRAMUSCULAR; INTRAVENOUS ONCE AS NEEDED
Status: DISCONTINUED | OUTPATIENT
Start: 2021-08-08 | End: 2021-08-08 | Stop reason: HOSPADM

## 2021-08-08 RX ORDER — HYDROCODONE BITARTRATE AND ACETAMINOPHEN 7.5; 325 MG/1; MG/1
1 TABLET ORAL EVERY 6 HOURS PRN
Status: DISCONTINUED | OUTPATIENT
Start: 2021-08-08 | End: 2021-08-09 | Stop reason: HOSPADM

## 2021-08-08 RX ORDER — MIDAZOLAM HYDROCHLORIDE 1 MG/ML
INJECTION INTRAMUSCULAR; INTRAVENOUS AS NEEDED
Status: DISCONTINUED | OUTPATIENT
Start: 2021-08-08 | End: 2021-08-08 | Stop reason: SURG

## 2021-08-08 RX ORDER — AMOXICILLIN 250 MG
2 CAPSULE ORAL 2 TIMES DAILY
Status: DISCONTINUED | OUTPATIENT
Start: 2021-08-08 | End: 2021-08-09 | Stop reason: HOSPADM

## 2021-08-08 RX ORDER — BISACODYL 5 MG/1
5 TABLET, DELAYED RELEASE ORAL DAILY PRN
Status: DISCONTINUED | OUTPATIENT
Start: 2021-08-08 | End: 2021-08-09 | Stop reason: HOSPADM

## 2021-08-08 RX ORDER — POTASSIUM CHLORIDE 7.45 MG/ML
10 INJECTION INTRAVENOUS
Status: DISCONTINUED | OUTPATIENT
Start: 2021-08-08 | End: 2021-08-09 | Stop reason: HOSPADM

## 2021-08-08 RX ORDER — FENTANYL CITRATE 50 UG/ML
INJECTION, SOLUTION INTRAMUSCULAR; INTRAVENOUS AS NEEDED
Status: DISCONTINUED | OUTPATIENT
Start: 2021-08-08 | End: 2021-08-08 | Stop reason: SURG

## 2021-08-08 RX ORDER — BISACODYL 10 MG
10 SUPPOSITORY, RECTAL RECTAL DAILY PRN
Status: DISCONTINUED | OUTPATIENT
Start: 2021-08-08 | End: 2021-08-09 | Stop reason: HOSPADM

## 2021-08-08 RX ORDER — MORPHINE SULFATE 4 MG/ML
1 INJECTION, SOLUTION INTRAMUSCULAR; INTRAVENOUS
Status: DISCONTINUED | OUTPATIENT
Start: 2021-08-08 | End: 2021-08-09 | Stop reason: HOSPADM

## 2021-08-08 RX ORDER — POLYETHYLENE GLYCOL 3350 17 G/17G
17 POWDER, FOR SOLUTION ORAL DAILY PRN
Status: DISCONTINUED | OUTPATIENT
Start: 2021-08-08 | End: 2021-08-09 | Stop reason: HOSPADM

## 2021-08-08 RX ORDER — POTASSIUM CHLORIDE 20 MEQ/1
40 TABLET, EXTENDED RELEASE ORAL AS NEEDED
Status: DISCONTINUED | OUTPATIENT
Start: 2021-08-08 | End: 2021-08-09 | Stop reason: HOSPADM

## 2021-08-08 RX ORDER — MEPERIDINE HYDROCHLORIDE 25 MG/ML
12.5 INJECTION INTRAMUSCULAR; INTRAVENOUS; SUBCUTANEOUS
Status: DISCONTINUED | OUTPATIENT
Start: 2021-08-08 | End: 2021-08-08 | Stop reason: HOSPADM

## 2021-08-08 RX ORDER — DEXAMETHASONE SODIUM PHOSPHATE 4 MG/ML
INJECTION, SOLUTION INTRA-ARTICULAR; INTRALESIONAL; INTRAMUSCULAR; INTRAVENOUS; SOFT TISSUE AS NEEDED
Status: DISCONTINUED | OUTPATIENT
Start: 2021-08-08 | End: 2021-08-08 | Stop reason: SURG

## 2021-08-08 RX ORDER — ATROPA BELLADONNA AND OPIUM 16.2; 6 MG/1; MG/1
SUPPOSITORY RECTAL AS NEEDED
Status: DISCONTINUED | OUTPATIENT
Start: 2021-08-08 | End: 2021-08-08 | Stop reason: HOSPADM

## 2021-08-08 RX ORDER — SODIUM CHLORIDE 9 MG/ML
INJECTION, SOLUTION INTRAVENOUS CONTINUOUS PRN
Status: DISCONTINUED | OUTPATIENT
Start: 2021-08-08 | End: 2021-08-08 | Stop reason: SURG

## 2021-08-08 RX ORDER — HYDROMORPHONE HCL 110MG/55ML
0.5 PATIENT CONTROLLED ANALGESIA SYRINGE INTRAVENOUS
Status: DISCONTINUED | OUTPATIENT
Start: 2021-08-08 | End: 2021-08-08 | Stop reason: HOSPADM

## 2021-08-08 RX ORDER — PROPOFOL 10 MG/ML
VIAL (ML) INTRAVENOUS AS NEEDED
Status: DISCONTINUED | OUTPATIENT
Start: 2021-08-08 | End: 2021-08-08 | Stop reason: SURG

## 2021-08-08 RX ORDER — SODIUM CHLORIDE 9 MG/ML
125 INJECTION, SOLUTION INTRAVENOUS CONTINUOUS
Status: DISCONTINUED | OUTPATIENT
Start: 2021-08-08 | End: 2021-08-09 | Stop reason: HOSPADM

## 2021-08-08 RX ORDER — ACETAMINOPHEN 325 MG/1
650 TABLET ORAL EVERY 4 HOURS PRN
Status: DISCONTINUED | OUTPATIENT
Start: 2021-08-08 | End: 2021-08-09 | Stop reason: HOSPADM

## 2021-08-08 RX ORDER — PANTOPRAZOLE SODIUM 40 MG/1
40 TABLET, DELAYED RELEASE ORAL EVERY MORNING
Status: DISCONTINUED | OUTPATIENT
Start: 2021-08-09 | End: 2021-08-09 | Stop reason: HOSPADM

## 2021-08-08 RX ORDER — KETOROLAC TROMETHAMINE 15 MG/ML
15 INJECTION, SOLUTION INTRAMUSCULAR; INTRAVENOUS ONCE
Status: COMPLETED | OUTPATIENT
Start: 2021-08-08 | End: 2021-08-08

## 2021-08-08 RX ORDER — MAGNESIUM SULFATE HEPTAHYDRATE 40 MG/ML
4 INJECTION, SOLUTION INTRAVENOUS AS NEEDED
Status: DISCONTINUED | OUTPATIENT
Start: 2021-08-08 | End: 2021-08-09 | Stop reason: HOSPADM

## 2021-08-08 RX ORDER — POLYETHYLENE GLYCOL 3350 17 G/17G
17 POWDER, FOR SOLUTION ORAL DAILY
Status: DISCONTINUED | OUTPATIENT
Start: 2021-08-08 | End: 2021-08-09 | Stop reason: HOSPADM

## 2021-08-08 RX ORDER — ONDANSETRON 2 MG/ML
4 INJECTION INTRAMUSCULAR; INTRAVENOUS ONCE
Status: COMPLETED | OUTPATIENT
Start: 2021-08-08 | End: 2021-08-08

## 2021-08-08 RX ORDER — HYDROMORPHONE HCL 110MG/55ML
0.5 PATIENT CONTROLLED ANALGESIA SYRINGE INTRAVENOUS ONCE
Status: COMPLETED | OUTPATIENT
Start: 2021-08-08 | End: 2021-08-08

## 2021-08-08 RX ORDER — DOCUSATE SODIUM 100 MG/1
100 CAPSULE, LIQUID FILLED ORAL 2 TIMES DAILY
Status: DISCONTINUED | OUTPATIENT
Start: 2021-08-08 | End: 2021-08-09 | Stop reason: HOSPADM

## 2021-08-08 RX ORDER — CYCLOBENZAPRINE HCL 10 MG
10 TABLET ORAL 3 TIMES DAILY PRN
Status: DISCONTINUED | OUTPATIENT
Start: 2021-08-08 | End: 2021-08-09 | Stop reason: HOSPADM

## 2021-08-08 RX ORDER — KETOROLAC TROMETHAMINE 30 MG/ML
30 INJECTION, SOLUTION INTRAMUSCULAR; INTRAVENOUS EVERY 6 HOURS PRN
Status: DISCONTINUED | OUTPATIENT
Start: 2021-08-08 | End: 2021-08-09 | Stop reason: HOSPADM

## 2021-08-08 RX ORDER — POTASSIUM CHLORIDE 1.5 G/1.77G
40 POWDER, FOR SOLUTION ORAL AS NEEDED
Status: DISCONTINUED | OUTPATIENT
Start: 2021-08-08 | End: 2021-08-09 | Stop reason: HOSPADM

## 2021-08-08 RX ADMIN — ONDANSETRON 4 MG: 2 INJECTION INTRAMUSCULAR; INTRAVENOUS at 17:18

## 2021-08-08 RX ADMIN — Medication 3 ML: at 20:15

## 2021-08-08 RX ADMIN — MIDAZOLAM 2 MG: 1 INJECTION INTRAMUSCULAR; INTRAVENOUS at 16:59

## 2021-08-08 RX ADMIN — ONDANSETRON 4 MG: 2 INJECTION INTRAMUSCULAR; INTRAVENOUS at 11:27

## 2021-08-08 RX ADMIN — HYDROMORPHONE HYDROCHLORIDE 0.5 MG: 2 INJECTION, SOLUTION INTRAMUSCULAR; INTRAVENOUS; SUBCUTANEOUS at 11:43

## 2021-08-08 RX ADMIN — FENTANYL CITRATE 100 MCG: 50 INJECTION, SOLUTION INTRAMUSCULAR; INTRAVENOUS at 16:59

## 2021-08-08 RX ADMIN — ONDANSETRON HYDROCHLORIDE 4 MG: 4 TABLET, FILM COATED ORAL at 20:49

## 2021-08-08 RX ADMIN — WATER 1 G: 100 INJECTION, SOLUTION INTRAVENOUS at 13:59

## 2021-08-08 RX ADMIN — SODIUM CHLORIDE 125 ML/HR: 9 INJECTION, SOLUTION INTRAVENOUS at 19:13

## 2021-08-08 RX ADMIN — KETOROLAC TROMETHAMINE 15 MG: 15 INJECTION, SOLUTION INTRAMUSCULAR; INTRAVENOUS at 11:27

## 2021-08-08 RX ADMIN — SODIUM CHLORIDE: 0.9 INJECTION, SOLUTION INTRAVENOUS at 16:58

## 2021-08-08 RX ADMIN — PROPOFOL 200 MG: 10 INJECTION, EMULSION INTRAVENOUS at 17:02

## 2021-08-08 RX ADMIN — PHENAZOPYRIDINE HYDROCHLORIDE 200 MG: 200 TABLET ORAL at 20:48

## 2021-08-08 RX ADMIN — SODIUM CHLORIDE 1000 ML: 9 INJECTION, SOLUTION INTRAVENOUS at 11:28

## 2021-08-08 RX ADMIN — MORPHINE SULFATE 1 MG: 4 INJECTION INTRAVENOUS at 20:57

## 2021-08-08 RX ADMIN — DEXAMETHASONE SODIUM PHOSPHATE 4 MG: 4 INJECTION, SOLUTION INTRAMUSCULAR; INTRAVENOUS at 17:18

## 2021-08-08 RX ADMIN — FENTANYL CITRATE 50 MCG: 50 INJECTION, SOLUTION INTRAMUSCULAR; INTRAVENOUS at 18:23

## 2021-08-08 NOTE — ANESTHESIA POSTPROCEDURE EVALUATION
Patient: Eleanor Bonds    Procedure Summary     Date: 08/08/21 Room / Location: [facility] OR 01 / [facility] MAIN OR    Anesthesia Start: 1658 Anesthesia Stop: 1731    Procedure: CYSTOSCOPY, RIGHT URETEROSCOPY, STONE EXTRACTION, RETROGRADE AND STENT PLACEMENT RIGHT URETER (NO STRING) (Right ) Diagnosis:     Surgeons: Jose Maria Mcelroy MD Provider: Salvatore Pantoja MD    Anesthesia Type: general ASA Status: 2          Anesthesia Type: general    Vitals  Vitals Value Taken Time   /84 08/08/21 1822   Temp 97.9 °F (36.6 °C) 08/08/21 1735   Pulse 75 08/08/21 1825   Resp 22 08/08/21 1740   SpO2 99 % 08/08/21 1825   Vitals shown include unvalidated device data.        Post Anesthesia Care and Evaluation    Patient location during evaluation: PACU  Patient participation: complete - patient participated  Level of consciousness: awake  Pain scale: See nurse's notes for pain score.  Pain management: adequate  Airway patency: patent  Anesthetic complications: No anesthetic complications  PONV Status: none  Cardiovascular status: acceptable  Respiratory status: acceptable  Hydration status: acceptable    Comments: Patient seen and examined postoperatively; vital signs stable; SpO2 greater than or equal to 90%; cardiopulmonary status stable; nausea/vomiting adequately controlled; pain adequately controlled; no apparent anesthesia complications; patient discharged from anesthesia care when discharge criteria were met

## 2021-08-08 NOTE — H&P
[facility] Medicine Services      Patient Name: Eleanor Bonds  : 1975  MRN: [id number]  Primary Care Physician:  Lizz Moraes APRN  Date of admission: 2021      Subjective      Chief Complaint: Right flank pain and decreased urination    History of Present Illness:  Patient is a 45-year-old female with medical history significant for cervical cancer stage I, low back pain and GERD.  Has had previous  histories of renal stone.    Presented to [facility] ER on 8/3/2021 with complaints of right flank pain and decreased urination.  Stated problem started a day ago.  Describes right flank pain as constant, moderate intensity and radiating to the groin.  Pain is minimally relieved with Tylenol and aggravated with movements.  Had associated nausea and vomiting.  Denies any fever or chills.  No change in bowel movements.    Work-up in the ED was suggestive of leukocytosis and possible UTI.  CT abdomen/pelvis showed severe hydronephrosis on the right secondary to 4 x 8 mm calculus at ureterovesical junction.  Patient was also noted to have tiny bilateral nonobstructing intrarenal calculi.    Patient was started on IV antibiotics, urologist consulted and she was admitted for further care.      Review of Systems   Constitutional: Negative for chills and fever.   HENT: Negative for congestion.    Eyes: Negative for blurred vision.   Cardiovascular: Negative for chest pain.   Respiratory: Negative for cough and shortness of breath.    Endocrine: Negative for cold intolerance and heat intolerance.   Gastrointestinal: Positive for nausea and vomiting.   Genitourinary: Positive for flank pain.   Neurological: Negative for dizziness and light-headedness.   Psychiatric/Behavioral: Negative for altered mental status.       Personal History     Past Medical History:   Diagnosis Date   • Cervical cancer (CMS/[facility])    • GERD (gastroesophageal reflux disease)    • Low back pain         Past Surgical History:   Procedure Laterality Date   • BACK SURGERY     • HYSTERECTOMY  12/2020       Family History: family history includes Arthritis in her mother; COPD in her father; Diabetes in her mother; Emphysema in her father; Heart disease in her father; Hyperlipidemia in her father and mother; Hypertension in her father and mother. Otherwise pertinent FHx was reviewed and not pertinent to current issue.    Social History:  reports that she has quit smoking. She has never used smokeless tobacco. She reports current alcohol use. She reports that she does not use drugs.    Home Medications:  Prior to Admission Medications     Prescriptions Last Dose Informant Patient Reported? Taking?    omeprazole (priLOSEC) 40 MG capsule 8/7/2021  No Yes    TAKE 1 CAPSULE BY MOUTH DAILY    cyclobenzaprine (FLEXERIL) 10 MG tablet   No No    Take 1 tablet by mouth 3 (Three) Times a Day As Needed for Muscle Spasms.            Allergies:  Allergies   Allergen Reactions   • Aspirin GI Intolerance       Objective      Vitals:   Temp:  [97.6 °F (36.4 °C)] 97.6 °F (36.4 °C)  Heart Rate:  [75-86] 83  Resp:  [20] 20  BP: (124-147)/(63-96) 138/63    Physical Exam  Vitals reviewed.   Constitutional:       General: She is not in acute distress.  HENT:      Head: Normocephalic and atraumatic.      Nose: Nose normal.      Mouth/Throat:      Mouth: Mucous membranes are moist.   Eyes:      Extraocular Movements: Extraocular movements intact.      Conjunctiva/sclera: Conjunctivae normal.      Pupils: Pupils are equal, round, and reactive to light.   Cardiovascular:      Rate and Rhythm: Normal rate and regular rhythm.      Heart sounds: Normal heart sounds.   Pulmonary:      Effort: No respiratory distress.      Breath sounds: Normal breath sounds.   Abdominal:      General: Bowel sounds are normal.      Palpations: Abdomen is soft.      Tenderness: There is right CVA tenderness.   Musculoskeletal:         General: Normal range of  motion.      Cervical back: Neck supple.   Skin:     General: Skin is warm and dry.   Neurological:      Mental Status: She is alert and oriented to person, place, and time.   Psychiatric:         Mood and Affect: Mood normal.         Result Review    Result Review:  I have personally reviewed the results from the time of this admission to 8/8/2021 15:38 EDT and agree with these findings:  [x]  Laboratory  [x]  Microbiology  [x]  Radiology  []  EKG/Telemetry   []  Cardiology/Vascular   []  Pathology  [x]  Old records  []  Other:  Most notable findings include: severe hydronephrosis on the right secondary to 4 x 8 mm calculus at ureterovesical junction.  Patient was also noted to have tiny bilateral nonobstructing intrarenal calculi.    Assessment/Plan        Active Hospital Problems:  There are no active hospital problems to display for this patient.    Plan:     Right ureterovesical junction renal stone with severe right hydronephrosis  CT abdomen/pelvis reviewed-also showed tiny bilateral nonobstructing intra renal calculi  Patient has a history of previous renal stone.  Supportive care with IV fluid, analgesic and antiemetic  Urologist consulted    Possible UTI  UA suggestive of UTI  Urine culture pending  On empirical antibiotics with Rocephin    Leukocytosis-probably reactive  Repeat labs in the a.m.    GERD-on PPI    Hypokalemia-on replacement protocol  Check serum magnesium    DVT prophylaxis- SCD    DVT prophylaxis:  No DVT prophylaxis order currently exists.    CODE STATUS:       Admission Status:  I believe this patient meets observation status.    I discussed the patient's findings and my recommendations with patient      This patient has been examined with appropriate PPE . 08/08/21      Signature: Electronically signed by Ramesh Sloan MD, 08/08/21, 3:49 PM EDT.

## 2021-08-08 NOTE — ANESTHESIA PREPROCEDURE EVALUATION
Anesthesia Evaluation     Patient summary reviewed and Nursing notes reviewed   NPO Solid Status: > 8 hours  NPO Liquid Status: > 8 hours           Airway   Mallampati: I  TM distance: >3 FB  Neck ROM: full  No difficulty expected  Dental - normal exam     Pulmonary - negative pulmonary ROS and normal exam   Cardiovascular - negative cardio ROS and normal exam        Neuro/Psych- negative ROS  GI/Hepatic/Renal/Endo    (+)  GERD,  renal disease stones,     Musculoskeletal (-) negative ROS    Abdominal  - normal exam    Bowel sounds: normal.   Substance History - negative use     OB/GYN negative ob/gyn ROS         Other      history of cancer remission                    Anesthesia Plan    ASA 2     general     intravenous induction     Anesthetic plan, all risks, benefits, and alternatives have been provided, discussed and informed consent has been obtained with: patient.

## 2021-08-08 NOTE — OP NOTE
Operative Note      Eleanor Bonds  45 y.o.  1975  female  [id number]      8/8/2021  Surgeon(s) and Role:     * Jose Maria Mcelroy MD - Primary   Pre-operative Diagnosis: *Right UVJ stone  UTI  Post-operative Diagnosis: Same  Complications: Impacted stone      Description of procedure: After consent was obtained the patient was taken the operating room and placed supine on the operating room table.  After general anesthesia was administered she was placed in the dorsal lithotomy position and prepped and draped in the standard fashion.  A cystoscope was placed per urethra into her bladder.  The right ureteral orifice was attempted to be cannulated with a wire but we are unable to get it past the stone.  I did perform rigid ureteroscopy visualize the stone in the distal ureter it was impacted I was able to then get a wire past this and disimpact the stone it was a soft stone did fragment and I was able to grasp the fragments and with a stone basket and remove them reperformed ureteroscopy there is no further stones stones I performed a retrograde I placed a variable length 6 Stateless stent with good coil in the renal pelvis and the bladder she was extubated in the operating room taken recovery room stable.  Thank you  Procedure(s) and Anesthesia Type:     * CYSTOSCOPY, RIGHT URETEROSCOPY, STONE EXTRACTION, RETROGRADE AND STENT PLACEMENT RIGHT URETER (NO STRING) - General    Specimens: Stone      Estimated Blood Loss: Less than 10 cc  Jose Maria Mcelroy MD  8/8/2021

## 2021-08-08 NOTE — CONSULTS
Urology Consult  Patient Identification:  Name: Eleanor Bonds  Age: 45 y.o.  Sex: female  : 1975  MRN: [id number]   Chief Complaint: flank pain  History of Present Illness:     1. Stone - 6mm right uvj pain was 10/10, radiating to the back, worse with movement, improved with pain meds, wbc 21, ua +    Ct independently reviewed by myself = right hydro, 6mm right uvj stone      Problem List:  Active Hospital Problems    Diagnosis  POA   • Hydronephrosis with ureteropelvic junction (UPJ) obstruction [Q62.11]  Yes      Resolved Hospital Problems   No resolved problems to display.     Past Medical History:  Past Medical History:   Diagnosis Date   • Cervical cancer (CMS/HCC)    • GERD (gastroesophageal reflux disease)    • Low back pain      Past Surgical History:  Past Surgical History:   Procedure Laterality Date   • BACK SURGERY     • HYSTERECTOMY  2020      Home Meds:  Medications Prior to Admission   Medication Sig Dispense Refill Last Dose   • omeprazole (priLOSEC) 40 MG capsule TAKE 1 CAPSULE BY MOUTH DAILY 30 capsule 0 2021 at Unknown time   • cyclobenzaprine (FLEXERIL) 10 MG tablet Take 1 tablet by mouth 3 (Three) Times a Day As Needed for Muscle Spasms. 30 tablet 0      Current Meds:     Current Facility-Administered Medications:   •  [MAR Hold] Magnesium Sulfate 2 gram Bolus, followed by 8 gram infusion (total Mg dose 10 grams)- Mg less than or equal to 1mg/dL, 2 g, Intravenous, PRN **OR** [MAR Hold] Magnesium Sulfate 2 gram / 50mL Infusion (GIVE X 3 BAGS TO EQUAL 6GM TOTAL DOSE) - Mg 1.1 - 1.5 mg/dl, 2 g, Intravenous, PRN **OR** [MAR Hold] Magnesium Sulfate 4 gram infusion- Mg 1.6-1.9 mg/dL, 4 g, Intravenous, PRN, Ramesh Sloan MD  •  [MAR Hold] potassium chloride (K-DUR,KLOR-CON) CR tablet 40 mEq, 40 mEq, Oral, PRN **OR** [MAR Hold] potassium chloride (KLOR-CON) packet 40 mEq, 40 mEq, Oral, PRN **OR** [MAR Hold] potassium chloride 10 mEq in 100 mL IVPB, 10 mEq, Intravenous, Q1H PRN,  "Ramesh Sloan MD  •  [MAR Hold] sodium chloride 0.9 % flush 10 mL, 10 mL, Intravenous, PRN, Pascual Anna PA  Allergies:  Allergies   Allergen Reactions   • Aspirin GI Intolerance     Immunizations:    There is no immunization history on file for this patient.  Social History:   Social History     Tobacco Use   • Smoking status: Former Smoker   • Smokeless tobacco: Never Used   Substance Use Topics   • Alcohol use: Yes     Comment: occ      Family History:  Family History   Problem Relation Age of Onset   • Diabetes Mother    • Hyperlipidemia Mother    • Hypertension Mother    • Arthritis Mother    • Heart disease Father    • Hyperlipidemia Father    • Hypertension Father    • COPD Father    • Emphysema Father       Review of Systems  negative 12 point system review except:as stated in the hpi  Objective:  tMax 24 hrs: Temp (24hrs), Av.5 °F (36.4 °C), Min:97.4 °F (36.3 °C), Max:97.6 °F (36.4 °C)    Vitals Ranges:   Temp:  [97.4 °F (36.3 °C)-97.6 °F (36.4 °C)] 97.4 °F (36.3 °C)  Heart Rate:  [68-86] 68  Resp:  [12-20] 12  BP: (124-147)/(63-96) 144/67  Intake and Output Last 3 Shifts:   No intake/output data recorded.  Exam:  /67   Pulse 68   Temp 97.4 °F (36.3 °C) (Temporal)   Resp 12   Ht 160 cm (63\")   Wt 80.3 kg (177 lb)   LMP 2020   SpO2 100%   Breastfeeding No   BMI 31.35 kg/m²       General Appearance: Alert, cooperative, no distress, appears stated age   Head: Normocephalic, without obvious abnormality, atraumatic   ENT: Normal hearing, external inspection, ears and nose   Eyes: Normal pupils/irises, external inspection, conjunctivae, eyelids   Back: No CVA tenderness   Respiratory: Normal effort, palpation, auscultation   CV: Normal auscultation, carotid pulses, no edema   Abdomen: No hernia, soft, nontender, no masses   :    Musculoskeletal: Normal extremities, nails, digits   Skin: Normal skin color, texture, no rashes or lesion   Neurologic/psych: Normal orientation, " mood, affect           Data Review:  CBC:   Results from last 7 days   Lab Units 08/08/21  1125   WBC 10*3/mm3 21.00*   RBC 10*6/mm3 5.32*      Assessment:    Hydronephrosis with ureteropelvic junction (UPJ) obstruction    Right uvj stone uti      Plan:    ivf  Iv abx  Right stent placement possible stone removal      Jose Maria Mcelroy MD  8/8/2021

## 2021-08-08 NOTE — ED NOTES
Pt c/o right flank pain w/ nausea and vomiting since yesterday at 2000.    Pt sts she has not urinated since yesterday.     Karlie Dwyer, LPN  08/08/21 1132

## 2021-08-08 NOTE — ED PROVIDER NOTES
Subjective   Patient is a 45-year-old female who presents with complaints of right flank pain and decreased urination that started yesterday.  She describes the pain as a constant sharp type pain that does radiate into the right lower side of her abdomen.  Patient states she tried taking Tylenol with minimal relief of her symptoms she denies any alleviating or exacerbating factors of her symptoms.  She reports associated nausea and vomiting.  She denies any hematemesis.  She denies any chest pain.  Reports some shortness of breath secondary to her pain.  She denies any diarrhea or constipation.  Patient states she is unsure when the last time she voided was.  No dysuria or hematuria yesterday.  Reports history or stones in the past.  Patient states she has not been on any recent antibiotics.  No recent travel or known sick contacts.  Denies any other alleviating or exacerbating factors of her symptoms.  No fever chills          Review of Systems   Constitutional: Negative.    HENT: Negative.    Eyes: Negative for photophobia and visual disturbance.   Respiratory: Positive for shortness of breath. Negative for apnea, cough, choking, chest tightness, wheezing and stridor.    Cardiovascular: Negative.    Gastrointestinal: Positive for abdominal pain, nausea and vomiting. Negative for abdominal distention, blood in stool, constipation and diarrhea.   Genitourinary: Positive for decreased urine volume, difficulty urinating and flank pain. Negative for dysuria, frequency, hematuria, pelvic pain, urgency, vaginal bleeding, vaginal discharge and vaginal pain.   Musculoskeletal: Negative for back pain, neck pain and neck stiffness.   Skin: Negative.    Neurological: Negative.    Hematological: Negative.        Past Medical History:   Diagnosis Date   • Cervical cancer (CMS/[facility])    • GERD (gastroesophageal reflux disease)    • Low back pain        Allergies   Allergen Reactions   • Aspirin GI Intolerance       Past Surgical  History:   Procedure Laterality Date   • BACK SURGERY     • HYSTERECTOMY  12/2020       Family History   Problem Relation Age of Onset   • Diabetes Mother    • Hyperlipidemia Mother    • Hypertension Mother    • Arthritis Mother    • Heart disease Father    • Hyperlipidemia Father    • Hypertension Father    • COPD Father    • Emphysema Father        Social History     Socioeconomic History   • Marital status:      Spouse name: Not on file   • Number of children: Not on file   • Years of education: Not on file   • Highest education level: Not on file   Tobacco Use   • Smoking status: Former Smoker   • Smokeless tobacco: Never Used   Substance and Sexual Activity   • Alcohol use: Yes     Comment: occ   • Drug use: No   • Sexual activity: Yes           Objective   Physical Exam  Vitals and nursing note reviewed.   Constitutional:       General: She is not in acute distress.     Appearance: Normal appearance. She is well-developed. She is not ill-appearing, toxic-appearing or diaphoretic.   HENT:      Head: Normocephalic and atraumatic.      Mouth/Throat:      Mouth: Mucous membranes are moist.      Pharynx: Oropharynx is clear.   Eyes:      General: No scleral icterus.     Extraocular Movements: Extraocular movements intact.      Pupils: Pupils are equal, round, and reactive to light.   Cardiovascular:      Rate and Rhythm: Normal rate and regular rhythm.      Pulses: Normal pulses.      Heart sounds: No murmur heard.   No friction rub. No gallop.    Pulmonary:      Effort: Pulmonary effort is normal. No respiratory distress.      Breath sounds: Normal breath sounds. No stridor. No wheezing, rhonchi or rales.   Chest:      Chest wall: No tenderness.   Abdominal:      General: Bowel sounds are normal. There is distension. There are no signs of injury.      Palpations: Abdomen is soft. There is no fluid wave or mass.      Tenderness: There is abdominal tenderness in the right lower quadrant and suprapubic area.  "There is no left CVA tenderness, guarding or rebound. Negative signs include Meza's sign and McBurney's sign.      Hernia: No hernia is present.   Musculoskeletal:      Cervical back: Normal range of motion and neck supple. No rigidity.   Skin:     General: Skin is warm.      Capillary Refill: Capillary refill takes less than 2 seconds.      Coloration: Skin is not cyanotic, jaundiced or pale.      Findings: No rash.   Neurological:      General: No focal deficit present.      Mental Status: She is alert and oriented to person, place, and time.   Psychiatric:         Mood and Affect: Mood normal.         Behavior: Behavior normal.         Procedures           ED Course    /63 (BP Location: Left arm, Patient Position: Lying)   Pulse 83   Temp 97.6 °F (36.4 °C) (Oral)   Resp 20   Ht 160 cm (63\")   Wt 80.3 kg (177 lb)   LMP 03/03/2020   SpO2 100%   Breastfeeding No   BMI 31.35 kg/m²   Medications   sodium chloride 0.9 % flush 10 mL (has no administration in time range)   ondansetron (ZOFRAN) injection 4 mg (4 mg Intravenous Given 8/8/21 1127)   ketorolac (TORADOL) injection 15 mg (15 mg Intravenous Given 8/8/21 1127)   sodium chloride 0.9 % bolus 1,000 mL (0 mL Intravenous Stopped 8/8/21 1359)   HYDROmorphone (DILAUDID) injection 0.5 mg (0.5 mg Intravenous Given 8/8/21 1143)   cefTRIAXone (ROCEPHIN) in SWFI 1 gram/10ml IV PUSH syringe (1 g Intravenous Given 8/8/21 1359)     Labs Reviewed   COMPREHENSIVE METABOLIC PANEL - Abnormal; Notable for the following components:       Result Value    Glucose 166 (*)     Potassium 3.2 (*)     Chloride 97 (*)     CO2 18.0 (*)     Anion Gap 21.0 (*)     All other components within normal limits    Narrative:     GFR Normal >60  Chronic Kidney Disease <60  Kidney Failure <15     LIPASE - Abnormal; Notable for the following components:    Lipase 12 (*)     All other components within normal limits   URINALYSIS W/ CULTURE IF INDICATED - Abnormal; Notable for the " following components:    Color, UA Dark Yellow (*)     Appearance, UA Cloudy (*)     Ketones, UA 40 mg/dL (2+) (*)     Blood, UA Large (3+) (*)     Protein,  mg/dL (2+) (*)     Leuk Esterase, UA Small (1+) (*)     All other components within normal limits   CBC WITH AUTO DIFFERENTIAL - Abnormal; Notable for the following components:    WBC 21.00 (*)     RBC 5.32 (*)     Platelets 483 (*)     Neutrophil % 86.4 (*)     Lymphocyte % 8.9 (*)     Monocyte % 4.2 (*)     Eosinophil % 0.0 (*)     Neutrophils, Absolute 18.20 (*)     All other components within normal limits   URINALYSIS, MICROSCOPIC ONLY - Abnormal; Notable for the following components:    RBC, UA 31-50 (*)     WBC, UA 21-30 (*)     Bacteria, UA Trace (*)     Squamous Epithelial Cells, UA 3-6 (*)     All other components within normal limits   URINE CULTURE   COVID PRE-OP / PRE-PROCEDURE SCREENING ORDER (NO ISOLATION)    Narrative:     The following orders were created for panel order COVID PRE-OP / PRE-PROCEDURE SCREENING ORDER (NO ISOLATION) - Swab, Nasopharynx.  Procedure                               Abnormality         Status                     ---------                               -----------         ------                     COVID-19,CEPHEID/ANGEL/BD...[[id number]]                      In process                   Please view results for these tests on the individual orders.   COVID-19,CEPHEID/ANGEL/BDMAX,COR/ZHEN/PAD/BRAULIO IN-HOUSE,NP SWAB IN TRANSPORT MEDIA 3-4 HR TAT, RT-PCR   CBC AND DIFFERENTIAL    Narrative:     The following orders were created for panel order CBC & Differential.  Procedure                               Abnormality         Status                     ---------                               -----------         ------                     CBC Auto Differential[[id number]]        Abnormal            Final result                 Please view results for these tests on the individual orders.   EXTRA TUBES    Narrative:     The  following orders were created for panel order Extra Tubes.  Procedure                               Abnormality         Status                     ---------                               -----------         ------                     Gold Top - SST[[id number]]                                   Final result               Green Top (Gel)[[id number]]                                  Final result                 Please view results for these tests on the individual orders.   GOLD TOP - SST   GREEN TOP     CT Abdomen Pelvis Without Contrast    Result Date: 8/8/2021  Severe hydronephrosis on the right secondary to a 4 x 8 mm calculus at the ureterovesical junction. Tiny bilateral nonobstructing intrarenal calculi. Incomplete distention of the colon. Questionable wall thickening with fatty infiltration of the distal ileum, ascending and transverse colon could be seen with chronic colitis. Incomplete distention is also possible. Cholelithiasis without findings of acute cholecystitis.    Electronically Signed By-Nuha Rivas MD On:8/8/2021 12:25 PM This report was finalized on [id number] by  Nuha Rivas MD.                                           MDM  Number of Diagnoses or Management Options  Hydronephrosis with ureteropelvic junction (UPJ) obstruction  Leukocytosis, unspecified type  Urinary tract infection with hematuria, site unspecified  Diagnosis management comments: Chart Review:  Comorbidity: As per past medical history  Labs: CBC shows WBC 21 hemoglobin 15 hematocrit 43.8 platelets two eight 43.  CMP shows glucose 166 BUN 11 creatinine 0.9 and sodium 136 potassium 3.2 urinalysis significant for small leukocyte esterase trace bacteria large blood.  Lipase 12.  Imaging: Was interpreted by physician and reviewed by myself:  CT Abdomen Pelvis Without Contrast  Result Date: 8/8/2021  Severe hydronephrosis on the right secondary to a 4 x 8 mm calculus at the ureterovesical junction. Tiny bilateral nonobstructing  intrarenal calculi. Incomplete distention of the colon. Questionable wall thickening with fatty infiltration of the distal ileum, ascending and transverse colon could be seen with chronic colitis. Incomplete distention is also possible. Cholelithiasis without findings of acute cholecystitis.    Electronically Signed By-Nuha Rivas MD On:8/8/2021 12:25 PM This report was finalized on [id number] by  Nuha Rivas MD.    Disposition/Treatment:  Appropriate PPE was worn during exam and throughout all encounters with the patient.  When the ED IV was placed and labs were obtained patient was afebrile and appeared nontoxic she was given fluids Zofran Toradol and then additional Dilaudid with improvement of her pain.  Lab results were significant for leukocytosis with a WBC of 21 urinalysis was significant for UTI urine culture pending.  Patient had normal BUN and creatinine.  CT of abdomen and pelvis was significant for obstructing stone in the right UVJ as above case was discussed with on-call urologist  who states he will take the patient to the OR for stent placement today.  Patient will be admitted to hospitalist group.  Spoke to Dr. Sloan who agreed for admission.  Lab results and findings were discussed with the patient and family at bedside he voiced understanding of admission and was in agreement with plan.  Patient will remain n.p.o. preop Covid swab pending upon admission       Amount and/or Complexity of Data Reviewed  Clinical lab tests: reviewed  Tests in the radiology section of CPT®: reviewed        Final diagnoses:   Hydronephrosis with ureteropelvic junction (UPJ) obstruction   Urinary tract infection with hematuria, site unspecified   Leukocytosis, unspecified type       ED Disposition  ED Disposition     ED Disposition Condition Comment    Send to OR  Level of Care: Med/Surg [1]  Admitting Physician: AYLIN SLOAN [[id number]]            No follow-up provider specified.       Medication List       No changes were made to your prescriptions during this visit.          Pascual Anna PA  08/08/21 [id number]

## 2021-08-08 NOTE — ANESTHESIA PROCEDURE NOTES
Airway  Urgency: elective    Date/Time: 8/8/2021 5:03 PM  Airway not difficult    General Information and Staff    Patient location during procedure: OR  Anesthesiologist: Salvatore Pantoja MD    Indications and Patient Condition  Indications for airway management: airway protection    Preoxygenated: yes  MILS maintained throughout  Mask difficulty assessment: 0 - not attempted    Final Airway Details  Final airway type: supraglottic airway      Successful airway: LMA  Size 4    Number of attempts at approach: 1  Assessment: lips, teeth, and gum same as pre-op and atraumatic intubation

## 2021-08-08 NOTE — PROGRESS NOTES
Impacted right uvj stone    S/p removal and stent placement    Admitted for ivf and abx    D/c home with 10d abx      My office will call to schedule follow up in 7-10 days for stent removal    Please call if any questions

## 2021-08-09 ENCOUNTER — READMISSION MANAGEMENT (OUTPATIENT)
Dept: CALL CENTER | Facility: HOSPITAL | Age: 46
End: 2021-08-09

## 2021-08-09 VITALS
SYSTOLIC BLOOD PRESSURE: 145 MMHG | RESPIRATION RATE: 15 BRPM | OXYGEN SATURATION: 100 % | WEIGHT: 175.93 LBS | HEART RATE: 63 BPM | DIASTOLIC BLOOD PRESSURE: 87 MMHG | BODY MASS INDEX: 31.17 KG/M2 | HEIGHT: 63 IN | TEMPERATURE: 98.4 F

## 2021-08-09 LAB
ANION GAP SERPL CALCULATED.3IONS-SCNC: 9 MMOL/L (ref 5–15)
BACTERIA SPEC AEROBE CULT: NO GROWTH
BASOPHILS # BLD AUTO: 0.2 10*3/MM3 (ref 0–0.2)
BASOPHILS NFR BLD AUTO: 1 % (ref 0–1.5)
BUN SERPL-MCNC: 8 MG/DL (ref 6–20)
BUN/CREAT SERPL: 11.9 (ref 7–25)
CALCIUM SPEC-SCNC: 7.9 MG/DL (ref 8.6–10.5)
CHLORIDE SERPL-SCNC: 104 MMOL/L (ref 98–107)
CO2 SERPL-SCNC: 24 MMOL/L (ref 22–29)
CREAT SERPL-MCNC: 0.67 MG/DL (ref 0.57–1)
DEPRECATED RDW RBC AUTO: 41.6 FL (ref 37–54)
EOSINOPHIL # BLD AUTO: 0 10*3/MM3 (ref 0–0.4)
EOSINOPHIL NFR BLD AUTO: 0 % (ref 0.3–6.2)
ERYTHROCYTE [DISTWIDTH] IN BLOOD BY AUTOMATED COUNT: 14 % (ref 12.3–15.4)
GFR SERPL CREATININE-BSD FRML MDRD: 95 ML/MIN/1.73
GLUCOSE SERPL-MCNC: 131 MG/DL (ref 65–99)
HCT VFR BLD AUTO: 37.2 % (ref 34–46.6)
HGB BLD-MCNC: 12.5 G/DL (ref 12–15.9)
LYMPHOCYTES # BLD AUTO: 1.5 10*3/MM3 (ref 0.7–3.1)
LYMPHOCYTES NFR BLD AUTO: 8.5 % (ref 19.6–45.3)
MCH RBC QN AUTO: 28.2 PG (ref 26.6–33)
MCHC RBC AUTO-ENTMCNC: 33.5 G/DL (ref 31.5–35.7)
MCV RBC AUTO: 84.1 FL (ref 79–97)
MONOCYTES # BLD AUTO: 0.6 10*3/MM3 (ref 0.1–0.9)
MONOCYTES NFR BLD AUTO: 3.3 % (ref 5–12)
NEUTROPHILS NFR BLD AUTO: 15.5 10*3/MM3 (ref 1.7–7)
NEUTROPHILS NFR BLD AUTO: 87.2 % (ref 42.7–76)
NRBC BLD AUTO-RTO: 0.1 /100 WBC (ref 0–0.2)
PLATELET # BLD AUTO: 358 10*3/MM3 (ref 140–450)
PMV BLD AUTO: 8.5 FL (ref 6–12)
POTASSIUM SERPL-SCNC: 4 MMOL/L (ref 3.5–5.2)
RBC # BLD AUTO: 4.43 10*6/MM3 (ref 3.77–5.28)
SODIUM SERPL-SCNC: 137 MMOL/L (ref 136–145)
WBC # BLD AUTO: 17.7 10*3/MM3 (ref 3.4–10.8)

## 2021-08-09 PROCEDURE — 80048 BASIC METABOLIC PNL TOTAL CA: CPT | Performed by: INTERNAL MEDICINE

## 2021-08-09 PROCEDURE — 85025 COMPLETE CBC W/AUTO DIFF WBC: CPT | Performed by: UROLOGY

## 2021-08-09 PROCEDURE — 25010000002 CEFTRIAXONE PER 250 MG: Performed by: INTERNAL MEDICINE

## 2021-08-09 PROCEDURE — 99217 PR OBSERVATION CARE DISCHARGE MANAGEMENT: CPT | Performed by: HOSPITALIST

## 2021-08-09 PROCEDURE — G0378 HOSPITAL OBSERVATION PER HR: HCPCS

## 2021-08-09 RX ORDER — CEFDINIR 300 MG/1
300 CAPSULE ORAL 2 TIMES DAILY
Qty: 20 CAPSULE | Refills: 0 | Status: SHIPPED | OUTPATIENT
Start: 2021-08-09 | End: 2021-08-19

## 2021-08-09 RX ADMIN — PHENAZOPYRIDINE HYDROCHLORIDE 200 MG: 200 TABLET ORAL at 09:22

## 2021-08-09 RX ADMIN — POLYETHYLENE GLYCOL 3350 17 G: 17 POWDER, FOR SOLUTION ORAL at 09:22

## 2021-08-09 RX ADMIN — PANTOPRAZOLE SODIUM 40 MG: 40 TABLET, DELAYED RELEASE ORAL at 06:11

## 2021-08-09 RX ADMIN — HYDROCODONE BITARTRATE AND ACETAMINOPHEN 1 TABLET: 7.5; 325 TABLET ORAL at 09:22

## 2021-08-09 RX ADMIN — PHENAZOPYRIDINE HYDROCHLORIDE 200 MG: 200 TABLET ORAL at 13:27

## 2021-08-09 RX ADMIN — SODIUM CHLORIDE 125 ML/HR: 9 INJECTION, SOLUTION INTRAVENOUS at 11:28

## 2021-08-09 RX ADMIN — CEFTRIAXONE SODIUM 1 G: 1 INJECTION, POWDER, FOR SOLUTION INTRAMUSCULAR; INTRAVENOUS at 13:27

## 2021-08-09 RX ADMIN — DOCUSATE SODIUM 100 MG: 100 CAPSULE, LIQUID FILLED ORAL at 09:22

## 2021-08-09 NOTE — OUTREACH NOTE
Prep Survey      Responses   [facility], [place], [facility] by [facility] patient discharged from?  [facility]   Is LACE score < 7 ?  Yes   Emergency Room discharge w/ pulse ox?  No   Eligibility  [facility]   Date of Admission  08/08/21   Date of Discharge  08/09/21   Discharge Disposition  Home or Self Care   Discharge diagnosis  Hydronephrosis with ureteropelvic junction (UPJ) obstruction    Does the patient have one of the following disease processes/diagnoses(primary or secondary)?  Other   Does the patient have Home health ordered?  No   Is there a DME ordered?  No   Prep survey completed?  Yes          Lashonda Christensen RN

## 2021-08-09 NOTE — DISCHARGE SUMMARY
[facility] Medicine Services  DISCHARGE SUMMARY    Patient Name: Eleanor Bonds  : 1975  MRN: [id number]    Date of Admission: 2021  Date of Discharge: 2021  Primary Care Physician: Lizz Moraes APRN      Presenting Problem:   Hydronephrosis with ureteropelvic junction (UPJ) obstruction [Q62.11]  Urinary tract infection with hematuria, site unspecified [N39.0, R31.9]  Leukocytosis, unspecified type [D72.829]    Active and Resolved Hospital Problems:  Active Hospital Problems   No active problems to display.      Resolved Hospital Problems    Diagnosis POA   • Hydronephrosis with ureteropelvic junction (UPJ) obstruction [Q62.11] Yes         Hospital Course     Hospital Course:  Chief Complaint: Right flank pain and decreased urination     History of Present Illness:  Patient is a 45-year-old female with medical history significant for cervical cancer stage I, low back pain and GERD.  Has had previous  histories of renal stone.     Presented to [facility] ER on 8/3/2021 with complaints of right flank pain and decreased urination.  Stated problem started a day ago.  Describes right flank pain as constant, moderate intensity and radiating to the groin.  Pain is minimally relieved with Tylenol and aggravated with movements.  Had associated nausea and vomiting.  Denies any fever or chills.  No change in bowel movements.     Work-up in the ED was suggestive of leukocytosis and possible UTI.  CT abdomen/pelvis showed severe hydronephrosis on the right secondary to 4 x 8 mm calculus at ureterovesical junction.  Patient was also noted to have tiny bilateral nonobstructing intrarenal calculi.     Patient was started on IV antibiotics, urologist consulted and she was admitted for further care.       Hospital course and problem list     Right ureterovesical junction renal stone  She is asymptomatic today, feels much better  With severe hydronephrosis  CT abdomen  pelvis also showed tiny bilateral obstructing calculi  Urology was consulted patient underwent ureteroscopy and stent placement and stone removal  She was started on IV antibiotics namely ceftriaxone  Urology cleared for discharge on oral antibiotics. She is going to be discharged on a course of Omnicef  Follow-up with urology as an outpatient for follow-up on cultures and otherwise.  Recommended to come back to the hospital for any fevers or any worsening symptoms occur    UTI  Antibiotics as above  Follow-up with PCP and urology as an outpatient    Leukocytosis  Improving, likely due to infection, pain, nausea vomiting  She is afebrile  Antibiotics as above    GERD  Continue PPI    Hypokalemia  Replace per protocol    DVT PUD prophylaxis    Plan discharge home, stable condition follow-up with PCP and urology as an outpatient.            DISCHARGE Follow Up Recommendations for labs and diagnostics: Repeat CBC within a week. This is to be done per PCP. This was discussed with patient will discuss with PCP      Reasons For Change In Medications and Indications for New Medications:      Day of Discharge     Vital Signs:  Temp:  [97.4 °F (36.3 °C)-98.5 °F (36.9 °C)] 98.4 °F (36.9 °C)  Heart Rate:  [59-95] 63  Resp:  [12-22] 15  BP: (131-158)/(67-98) 145/87  Flow (L/min):  [10] 10    Physical Exam:  Physical Exam   Constitutional:  oriented to person, place, and time. No distress.   HENT:   Head: Normocephalic and atraumatic.   Eyes: Conjunctivae and EOM are normal. Pupils are equal, round, and reactive to light.   Neck: No JVD present. No thyromegaly present.   Cardiovascular: Normal rate, regular rhythm, normal heart sounds and intact distal pulses. Exam reveals no gallop and no friction rub.   No murmur heard.  Pulmonary/Chest: Effort normal and breath sounds normal. No stridor. No respiratory distress.  has no wheezes.  has no rales.  exhibits no tenderness.   Abdominal: Soft. Bowel sounds are normal.  no distension  and no mass. There is no tenderness. There is no rebound and no guarding. No hernia.   Musculoskeletal: Normal range of motion.   Lymphadenopathy:     no cervical adenopathy.   Neurological:  alert and oriented to person, place, and time. No cranial nerve deficit or sensory deficit. exhibits normal muscle tone.   Skin: No rash noted.  not diaphoretic.   Psychiatric:  normal mood and affect.   Vitals reviewed.        Pertinent  and/or Most Recent Results     LAB RESULTS:      Lab 08/09/21  0332 08/08/21  1125   WBC 17.70* 21.00*   HEMOGLOBIN 12.5 15.0   HEMATOCRIT 37.2 43.8   PLATELETS 358 483*   NEUTROS ABS 15.50* 18.20*   LYMPHS ABS 1.50 1.90   MONOS ABS 0.60 0.90   EOS ABS 0.00 0.00   MCV 84.1 82.3         Lab 08/09/21  0332 08/08/21  1124   SODIUM 137 136   POTASSIUM 4.0 3.2*   CHLORIDE 104 97*   CO2 24.0 18.0*   ANION GAP 9.0 21.0*   BUN 8 11   CREATININE 0.67 0.99   GLUCOSE 131* 166*   CALCIUM 7.9* 9.4   MAGNESIUM  --  1.5*         Lab 08/08/21  1124   TOTAL PROTEIN 7.9   ALBUMIN 4.50   GLOBULIN 3.4   ALT (SGPT) 29   AST (SGOT) 21   BILIRUBIN 1.0   ALK PHOS 93   LIPASE 12*                     Brief Urine Lab Results  (Last result in the past 365 days)      Color   Clarity   Blood   Leuk Est   Nitrite   Protein   CREAT   Urine HCG        08/08/21 1314 Dark Yellow  Comment:  Result checked  Cloudy  Comment:  Result checked  Large (3+) Small (1+) Negative 100 mg/dL (2+)             Microbiology Results (last 10 days)     Procedure Component Value - Date/Time    COVID PRE-OP / PRE-PROCEDURE SCREENING ORDER (NO ISOLATION) - Swab, Nasopharynx [[id number]]  (Normal) Collected: 08/08/21 1418    Lab Status: Final result Specimen: Swab from Nasopharynx Updated: 08/08/21 1457    Narrative:      The following orders were created for panel order COVID PRE-OP / PRE-PROCEDURE SCREENING ORDER (NO ISOLATION) - Swab, Nasopharynx.  Procedure                               Abnormality         Status                     ---------                                -----------         ------                     COVID-19,CEPHEID/ANGEL/BD...[[id number]]  Normal              Final result                 Please view results for these tests on the individual orders.    COVID-19,CEPHEID/ANGEL/BDMAX,COR/[facility]/PAD/BRAULIO IN-HOUSE(OR EMERGENT/ADD-ON),NP SWAB IN TRANSPORT MEDIA 3-4 HR TAT, RT-PCR - Swab, Nasopharynx [[id number]]  (Normal) Collected: 08/08/21 1418    Lab Status: Final result Specimen: Swab from Nasopharynx Updated: 08/08/21 1457     COVID19 Not Detected    Narrative:      Fact sheet for providers: https://www.fda.gov/media/921524/download     Fact sheet for patients: https://www.fda.gov/media/767501/download  Fact sheet for providers: https://www.fda.gov/media/981051/download    Fact sheet for patients: https://www.fda.gov/media/967352/download    Test performed by PCR.    Urine Culture - Urine, Urine, Catheter In/Out [[id number]]  (Normal) Collected: 08/08/21 1314    Lab Status: Preliminary result Specimen: Urine, Catheter In/Out Updated: 08/09/21 1042     Urine Culture No growth          CT Abdomen Pelvis Without Contrast    Result Date: 8/8/2021  Impression: Severe hydronephrosis on the right secondary to a 4 x 8 mm calculus at the ureterovesical junction. Tiny bilateral nonobstructing intrarenal calculi. Incomplete distention of the colon. Questionable wall thickening with fatty infiltration of the distal ileum, ascending and transverse colon could be seen with chronic colitis. Incomplete distention is also possible. Cholelithiasis without findings of acute cholecystitis.    Electronically Signed By-Nuha Rivas MD On:8/8/2021 12:25 PM This report was finalized on [id number] by  Nuha Rivas MD.    CT Abdomen Pelvis Stone Protocol    Result Date: 7/20/2021  Impression:  1.  Punctate bilateral nonobstructing renal stones.  No evidence of ureteral stone or hydronephrosis. 2.  Cholelithiasis but no CT evidence of acute cholecystitis or biliary tract  obstruction. 3.  Interval hysterectomy.  Electronically Signed By-Geo Fishman MD On:7/20/2021 2:45 PM This report was finalized on [id number] by  Geo Fishman MD.      Results for orders placed in visit on 05/28/21    Duplex Venous Lower Extremity - Left CAR    Interpretation Summary  · Chronic left lower extremity deep vein thrombosis noted in the common femoral, proximal femoral and popliteal.  · All other left sided veins appeared normal.      Results for orders placed in visit on 05/28/21    Duplex Venous Lower Extremity - Left CAR    Interpretation Summary  · Chronic left lower extremity deep vein thrombosis noted in the common femoral, proximal femoral and popliteal.  · All other left sided veins appeared normal.      Results for orders placed during the hospital encounter of 09/04/19    Adult Transthoracic Echo Complete W/ Cont if Necessary Per Protocol    Interpretation Summary  Indications  Chest pain    Technically satisfactory study.  Mitral valve is structurally normal.  Tricuspid valve is structurally normal.  Aortic valve is structurally normal.  Pulmonic valve could not be well visualized.  No evidence for mitral tricuspid or aortic regurgitation is seen by Doppler study.  Left atrium is normal in size.  Right atrium is normal in size.  Left ventricle is normal in size and contractility with ejection fraction of 60%.  Right ventricle is normal in size.  Atrial septum is intact.  Aorta is normal.  No pericardial effusion or intracardiac thrombus is seen.    Impression  Structurally and functionally normal cardiac valves.  Normal left ventricular size and contractility.  Normal echo Doppler study.  Left ventricular ejection fraction is 60%.      Labs Pending at Discharge:  Pending Labs     Order Current Status    STONE ANALYSIS - Calculus, Ureter, Right In process    Urine Culture - Urine, Urine, Catheter In/Out Preliminary result          Procedures Performed  Procedure(s):  CYSTOSCOPY, RIGHT  URETEROSCOPY, STONE EXTRACTION, RETROGRADE AND STENT PLACEMENT RIGHT URETER (NO STRING)         Consults:   Consults     Date and Time Order Name Status Description    8/8/2021  6:54 PM Inpatient Urology Consult      8/8/2021  2:11 PM Hospitalist (on-call MD unless specified) Completed     8/8/2021  1:54 PM Urology (on-call MD unless specified) Completed             Discharge Details        Discharge Medications      New Medications      Instructions Start Date   cefdinir 300 MG capsule  Commonly known as: OMNICEF   300 mg, Oral, 2 Times Daily         Continue These Medications      Instructions Start Date   cyclobenzaprine 10 MG tablet  Commonly known as: FLEXERIL   10 mg, Oral, 3 Times Daily PRN      omeprazole 40 MG capsule  Commonly known as: priLOSEC   40 mg, Oral, Daily             Allergies   Allergen Reactions   • Aspirin GI Intolerance         Discharge Disposition:  Home or Self Care    Diet:  Hospital:  Diet Order   Procedures   • Diet Regular         Discharge Activity:   Activity Instructions     Activity as Tolerated              CODE STATUS:  Code Status and Medical Interventions:   Ordered at: 08/08/21 1600     Level Of Support Discussed With:    Patient     Code Status:    CPR     Medical Interventions (Level of Support Prior to Arrest):    Full         Future Appointments   Date Time Provider Department Center   8/12/2021  1:30 PM Abdon Mauricio, PT [facility]   8/16/2021 10:00 AM Javi Campos IV, MD [facility]   8/17/2021  1:00 PM [facility]  [facility]       Additional Instructions for the Follow-ups that You Need to Schedule     Discharge Follow-up with PCP   As directed       Currently Documented PCP:    Lizz Moraes APRN    PCP Phone Number:    [phone]     Follow Up Details: 2-3days         Discharge Follow-up with Specialty: urology 7 days   As directed      Specialty: urology 7 days               Time spent on Discharge including face to face service: 38  minutes      Signature:   Electronically signed by Lelo Parry MD, 08/09/21, 3:39 PM EDT.

## 2021-08-09 NOTE — CASE MANAGEMENT/SOCIAL WORK
Case Management Discharge Note                Selected Continued Care - Admitted Since 8/8/2021      Final Discharge Disposition Code: 01 - home or self-care

## 2021-08-09 NOTE — PLAN OF CARE
Goal Outcome Evaluation:  Plan of Care Reviewed With: patient   Patient afebrile overnight with vital signs stable. Remains alert and oriented x 4. Pain managed with PRN morphine order - see MAR. Patient's only concern was slight hematuria, pt reassured this is expected following uretal stent placement and has the potential to keep occurring over the next few days. Otherwise, no other concerns. Denies shortness of breath/angina at this time. Will continue to monitor.      Respectfully,   Quentin Tapia RN          Problem: Adult Inpatient Plan of Care  Goal: Absence of Hospital-Acquired Illness or Injury  Intervention: Prevent Infection  Description: Maintain skin and mucous membrane integrity; promote hand, oral and pulmonary hygiene.  Optimize fluid balance, nutrition, sleep and glycemic control to maximize infection resistance.  Identify potential sources of infection early to prevent or mitigate progression of infection (e.g., wound, lines, devices).  Evaluate ongoing need for invasive devices; remove promptly when no longer indicated.  Recent Flowsheet Documentation  Taken 8/9/2021 0300 by Quentin Tapia RN  Infection Prevention:   hand hygiene promoted   rest/sleep promoted   single patient room provided  Taken 8/9/2021 0100 by Quentin Tapia RN  Infection Prevention:   hand hygiene promoted   rest/sleep promoted   single patient room provided  Taken 8/8/2021 2300 by Quentin Taipa RN  Infection Prevention:   hand hygiene promoted   rest/sleep promoted   single patient room provided  Taken 8/8/2021 2130 by Quentin Tapia RN  Infection Prevention:   hand hygiene promoted   rest/sleep promoted   single patient room provided  Taken 8/8/2021 2051 by Quentin Tapia RN  Infection Prevention:   hand hygiene promoted   rest/sleep promoted   single patient room provided  Taken 8/8/2021 1941 by Quentin Tapia RN  Infection Prevention:   hand hygiene promoted   rest/sleep promoted   single patient room provided    Problem:  Adult Inpatient Plan of Care  Goal: Optimal Comfort and Wellbeing  Intervention: Provide Person-Centered Care  Description: Use a family-focused approach to care.  Develop trust and rapport by proactively providing information, encouraging questions, addressing concerns and offering reassurance.  Acknowledge emotional response to hospitalization.  Recognize and utilize personal coping strategies.  Honor spiritual and cultural preferences.  Recent Flowsheet Documentation  Taken 8/8/2021 2051 by Quentin Tapia RN  Trust Relationship/Rapport:   care explained   choices provided   emotional support provided   empathic listening provided   questions answered   questions encouraged   reassurance provided   thoughts/feelings acknowledged

## 2021-08-09 NOTE — PLAN OF CARE
Goal Outcome Evaluation:              Outcome Summary: pt doing well and will discharge today to follow up with urology next week. Pt is tolerating pain meds well

## 2021-08-10 ENCOUNTER — TELEPHONE (OUTPATIENT)
Dept: FAMILY MEDICINE CLINIC | Facility: CLINIC | Age: 46
End: 2021-08-10

## 2021-08-10 ENCOUNTER — TRANSITIONAL CARE MANAGEMENT TELEPHONE ENCOUNTER (OUTPATIENT)
Dept: CALL CENTER | Facility: HOSPITAL | Age: 46
End: 2021-08-10

## 2021-08-10 NOTE — OUTREACH NOTE
Call Center TCM Note      Responses   [facility] patient discharged from?  [facility]   Does the patient have one of the following disease processes/diagnoses(primary or secondary)?  Other   TCM attempt successful?  Yes   Call start time  1507   Call end time  1509   Discharge diagnosis  Hydronephrosis with ureteropelvic junction (UPJ) obstruction    Person spoke with today (if not patient) and relationship  Patient   Meds reviewed with patient/caregiver?  Yes   Is the patient having any side effects they believe may be caused by any medication additions or changes?  No   Does the patient have all medications ordered at discharge?  Yes   Is the patient taking all medications as directed (includes completed medication regime)?  Yes   Does the patient have a primary care provider?   Yes   Does the patient have an appointment with their PCP within 7 days of discharge?  Yes   Comments regarding PCP  \[facility]\"" appointment on 8/11/21 at 8:30 AM   Has the patient kept scheduled appointments due by today?  N/A   What is the Home health agency?   No HH   Psychosocial issues?  No   Did the patient receive a copy of their discharge instructions?  Yes   Nursing interventions  Reviewed instructions with patient   What is the patient's perception of their health status since discharge?  Improving   Is the patient/caregiver able to teach back signs and symptoms related to disease process for when to call PCP?  Yes   Is the patient/caregiver able to teach back signs and symptoms related to disease process for when to call 911?  Yes   Is the patient/caregiver able to teach back the hierarchy of who to call/visit for symptoms/problems? PCP, Specialist, Home health nurse, Urgent Care, ED, 911  Yes   If the patient is a current smoker, are they able to teach back resources for cessation?  Not a smoker   TCM call completed?  Yes   Wrap up additional comments  Pt states she is doing ok. Patient verified PCP \[facility]\"" appt,  tomorrow, on 8/11/21. No questions/concerns.          Anjali Rivera RN    8/10/2021, 15:10 EDT

## 2021-08-10 NOTE — TELEPHONE ENCOUNTER
Caller: Eleanor Bonds    Relationship to patient: Self    Best call back number:     New or established patient?  [] New  [x] Established    Date of discharge: 08/09/2021    Facility discharged from: [facility]    Diagnosis/Symptoms: STINK WAS PUT IN PATIENTS KIDNEY    Length of stay (If applicable):  24 HOURS      Specialty Only: Did you see a [facility] health provider?    [] Yes  [] No  If so, who?

## 2021-08-11 ENCOUNTER — OFFICE VISIT (OUTPATIENT)
Dept: FAMILY MEDICINE CLINIC | Facility: CLINIC | Age: 46
End: 2021-08-11

## 2021-08-11 VITALS
BODY MASS INDEX: 31.72 KG/M2 | SYSTOLIC BLOOD PRESSURE: 154 MMHG | HEART RATE: 75 BPM | TEMPERATURE: 97.7 F | DIASTOLIC BLOOD PRESSURE: 97 MMHG | OXYGEN SATURATION: 99 % | WEIGHT: 179 LBS

## 2021-08-11 DIAGNOSIS — N20.0 KIDNEY STONE: Primary | ICD-10-CM

## 2021-08-11 PROCEDURE — 99496 TRANSJ CARE MGMT HIGH F2F 7D: CPT | Performed by: NURSE PRACTITIONER

## 2021-08-11 NOTE — PROGRESS NOTES
Transitional Care Follow Up Visit  Subjective     Eleanoramber Bonds is a 45 y.o. female who presents for a transitional care management visit.    Within 48 business hours after discharge our office contacted her via telephone to coordinate her care and needs.      I reviewed and discussed the details of that call along with the discharge summary, hospital problems, inpatient lab results, inpatient diagnostic studies, and consultation reports with Eleanor.     Current outpatient and discharge medications have been reconciled for the patient.  Reviewed by: HOLLEY Valdez      Date of TCM Phone Call 8/9/2021   [facility]   Date of Admission 8/8/2021   Date of Discharge 8/9/2021   Discharge Disposition Home or Self Care     Risk for Readmission (LACE) Score: 4 (8/9/2021  6:00 AM)      PT is here today for a [facility] admission follow up.  Pt was admitted from 8/8 -8/9  She went to the ER with c/o right flank pain and decreased urination.  Has a history of stage 1 cervical cancer.   CT abdomen/pelvis showed severe hydronephrosis on the right secondary to 4 x 8 mm calculus at ureterovesical junction.  Urology was consulted and she underwent a ureteroscopy with stent placement and stone removal.   Pt was discharged on omnicef  She is scheduled to go back on 8/16 to have the stent removed in [place] with Dr. Mcelroy.  She states that she still has some hematuria with urination.  Denies any fevers.  Denies vomiting.   Overall she is doing ok at this time.      Course During Hospital Stay:  See HPI     The following portions of the patient's history were reviewed and updated as appropriate: allergies, current medications, past family history, past medical history, past social history, past surgical history and problem list.    Review of Systems   Constitutional: Negative for chills, fatigue and fever.   Respiratory: Negative for chest tightness and shortness of breath.    Cardiovascular: Negative for  chest pain and palpitations.   Gastrointestinal: Positive for abdominal pain and diarrhea. Negative for nausea and vomiting.   Genitourinary: Positive for hematuria and pelvic pain.   Musculoskeletal: Positive for back pain.   Neurological: Negative for dizziness and headaches.       Objective   Physical Exam  Constitutional:       Appearance: Normal appearance. She is not ill-appearing.   HENT:      Head: Normocephalic and atraumatic.   Cardiovascular:      Rate and Rhythm: Normal rate and regular rhythm.      Heart sounds: No murmur heard.     Pulmonary:      Effort: Pulmonary effort is normal. No respiratory distress.      Breath sounds: Normal breath sounds.   Skin:     General: Skin is warm and dry.   Neurological:      General: No focal deficit present.      Mental Status: She is alert and oriented to person, place, and time.   Psychiatric:         Mood and Affect: Mood normal.         Behavior: Behavior normal.         Thought Content: Thought content normal.         Judgment: Judgment normal.         Assessment/Plan   Problems Addressed this Visit     None      Visit Diagnoses     Kidney stone    -  Primary    stable at this time  stent in place- removal on Monday  no fever at this time  cont omnicef  return to ER for worsening symptoms      Diagnoses       Codes Comments    Kidney stone    -  Primary ICD-10-CM: N20.0  ICD-9-CM: 592.0 stable at this time  stent in place- removal on Monday  no fever at this time  cont omnicef  return to ER for worsening symptoms        BP somewhat elevated but likely due to pt's pain level. Will monitor

## 2021-08-12 ENCOUNTER — TREATMENT (OUTPATIENT)
Dept: PHYSICAL THERAPY | Facility: CLINIC | Age: 46
End: 2021-08-12

## 2021-08-12 DIAGNOSIS — M54.42 ACUTE LEFT-SIDED LOW BACK PAIN WITH LEFT-SIDED SCIATICA: Primary | ICD-10-CM

## 2021-08-12 PROCEDURE — 97110 THERAPEUTIC EXERCISES: CPT | Performed by: PHYSICAL THERAPIST

## 2021-08-12 PROCEDURE — 97014 ELECTRIC STIMULATION THERAPY: CPT | Performed by: PHYSICAL THERAPIST

## 2021-08-12 NOTE — PROGRESS NOTES
Physical Therapy Initial Evaluation and Plan of Care    Patient: Eleanor Bonds   : 1975  Diagnosis/ICD-10 Code:  Acute left-sided low back pain with left-sided sciatica [M54.42]  Referring practitioner: HOLLEY Valdez  Date of Initial Visit: 2021  Today's Date: 2021  Patient seen for 1 sessions           Subjective Questionnaire: Oswestry: 22%      Subjective Evaluation    History of Present Illness  Onset date: ~1-2 monts.  Mechanism of injury: 46 y/o female with with hx of intermittent LBP: microdiskectomy ~7 yrs ago. C/o L LB that radiates into buttock and around torso. Numbness and pain in same area. Still passing stones and pressure in bladder.    MRI scheduled for 21; spine MD appt 21.    Stent placed secondary to Kidney stones -  21.  Bowel/bladder: severe bladder infection.        Patient Occupation: Homemaker Quality of life: excellent    Pain  Current pain rating: 3  At best pain rating: 3  At worst pain ratin  Location: see above: L LB/buttock  Quality: throbbing, discomfort, dull ache and sharp (bedning - sharp pain in LB)  Aggravating factors: prolonged positioning and standing (can't sleep on left side: sleep interrupted.)  Progression: no change    Social Support  Lives in: one-story house (15 steps to enter w/ rail)  Lives with: spouse and adult children    Hand dominance: right    Diagnostic Tests  Abnormal MRI: pending.    Treatments  Previous treatment: physical therapy  Current treatment: medication  Discharged from (in last 30 days): inpatient hospitalization  Discharged from (in last 30 days) comments: emergency surgery at [facility]: kidney stent placement.     Patient Goals  Patient goals for therapy: decreased pain, independence with ADLs/IADLs, return to sport/leisure activities and increased motion  Patient goal: play with grandchildren           Objective          Postural Observations  Seated posture: fair  Standing posture: fair    Additional  Postural Observation Details  Sits asymmetrically; IC level.    Tenderness     Lumbar Spine  Tenderness in the spinous process.     Additional Tenderness Details  L2-3; and L3-4; L5- S1; L SI     Neurological Testing     Sensation     Lumbar   Left   Diminished: light touch    Reflexes   Left   Patellar (L4): normal (2+)  Achilles (S1): absent (0)    Right   Patellar (L4): normal (2+)  Achilles (S1): absent (0)    Additional Neurological Details  Numbness wraps around lateral trunk and left buttock - more with sitting.     Active Range of Motion     Lumbar   Flexion: Active lumbar flexion: mod to max - finger tipds to mid thighs. with pain  Extension: Active lumbar extension: mod with pain  Left rotation: Active left lumbar rotation: mod. with pain  Right rotation: Active right lumbar rotation: min. with pain    Additional Active Range of Motion Details  Slight scoliosis noted with L sided torsion and pronounced paraspinals.     Strength/Myotome Testing     Lumbar   Left   Normal strength    Right   Normal strength    Additional Strength Details  Weakness noted in core with functional transfers and activities. Poor transfer techniques.     Tests     Lumbar     Left   Positive passive SLR.     Additional Tests Details  Seated SLR: (+) L LE    Lumbar Flexibility Comments:   Decreased flexibility in hamstrings and piriformis B; hip flexor B          Assessment & Plan     Assessment  Impairments: abnormal muscle firing, abnormal muscle tone, abnormal or restricted ROM, activity intolerance, impaired physical strength, lacks appropriate home exercise program and pain with function  Assessment details: Pt presents to PT L sided LB pain with numbess and radiating pain into lateral torso, buttock, lateral hip; weak core; (+) SLR and tenderness in lumbar and sacral spine.   Pt is appropriate for the skilled PT interventions to address the deficits noted above; has the potential to benefit from PT. Will be seen until patient  plateaus, goals met; or is discharged per MD.    Prognosis: good  Functional Limitations: lifting, sleeping, walking, pulling, uncomfortable because of pain, sitting, standing and stooping  Goals  Plan Goals: SHORT TERM GOALS: Time for Goal Achievement: 4 weeks    1.  Patient to be compliant w/ the HEP and tolerate progression.                            2.  Pain level < 4/10 at worst with mentioned activities to improve function.  3.  Increased lumbar AROM to by 25% in all planes to allow for increased ease with sit-stand transfers and functional activities.    LONG TERM GOALS: Time for Goal Achievement: D/C  1.  Outcome survey to show significant improvement.  2.  Pain level < 1/10 with all listed activities to return to normal.  3.  Lumbar AROM to WFL to allow for return to household, work & recreational activities w/o increase in symptoms.  4.  (B) LE and lower abdominal strength to 5/5 to allow for pushing, pulling and activities to occur without pain (driving, sitting, household  & Job requirements)        Plan  Therapy options: will be seen for skilled physical therapy services  Planned modality interventions: electrical stimulation/Russian stimulation, cryotherapy, TENS, traction and ultrasound  Other planned modality interventions: dry needling  Planned therapy interventions: manual therapy, abdominal trunk stabilization, ADL retraining, neuromuscular re-education, spinal/joint mobilization, soft tissue mobilization, strengthening, stretching, therapeutic activities, flexibility, body mechanics training, postural training, functional ROM exercises and home exercise program  Frequency: 2x week  Duration in visits: 10  Treatment plan discussed with: patient        History # of Personal Factors and/or Comorbidities: MODERATE (1-2)  Examination of Body System(s): # of elements: MODERATE (3)  Clinical Presentation: EVOLVING  Clinical Decision Making: MODERATE      Timed:         Manual Therapy:         mins   46036;     Therapeutic Exercise:    15     mins  89745;     Neuromuscular Nell:        mins  62695;    Therapeutic Activity:          mins  68197;     Gait Training:           mins  90466;     Ultrasound:          mins  33336;    Ionto                                   mins   77782  Self Care                            mins   08058  Aquatic                               mins 40684      Un-Timed:  Electrical Stimulation:    15     mins  65552 ( );  Dry Needling          mins self-pay  Traction          mins 62999  Low Eval          Mins  76638  Mod Eval     30     Mins  75200  High Eval                           Mins  01657  Re-Eval                               mins  10359        Timed Treatment:   15   mins   Total Treatment:     60   mins    PT SIGNATURE: Kalyan Mauricio, BRENDAN   DATE TREATMENT INITIATED: 8/12/2021    Initial Certification  Certification Period: 11/10/2021  I certify that the therapy services are furnished while this patient is under my care.  The services outlined above are required by this patient, and will be reviewed every 90 days.     PHYSICIAN: Lizz Moraes, HOLLEY      DATE:     Please sign and return via fax to [phone].. Thank you, [facility].

## 2021-08-17 ENCOUNTER — HOSPITAL ENCOUNTER (OUTPATIENT)
Dept: MRI IMAGING | Facility: HOSPITAL | Age: 46
Discharge: HOME | End: 2021-08-17
Admitting: NURSE PRACTITIONER

## 2021-08-17 DIAGNOSIS — M54.42 ACUTE LEFT-SIDED LOW BACK PAIN WITH LEFT-SIDED SCIATICA: ICD-10-CM

## 2021-08-17 DIAGNOSIS — D72.829 LEUKOCYTOSIS, UNSPECIFIED TYPE: Primary | ICD-10-CM

## 2021-08-17 LAB
CALCIUM OXALATE DIHYDRATE MFR STONE IR: 70 %
COLOR STONE: NORMAL
COM MFR STONE: 25 %
COMPN STONE: NORMAL
HYDROXYAPATITE 24H ENGDIFF UR: 5 %
LABORATORY COMMENT REPORT: NORMAL
Lab: NORMAL
Lab: NORMAL
PHOTO: NORMAL
SIZE STONE: NORMAL MM
SPEC SOURCE SUBJ: NORMAL
WT STONE: 96 MG

## 2021-08-17 PROCEDURE — 72148 MRI LUMBAR SPINE W/O DYE: CPT

## 2021-08-17 NOTE — PROGRESS NOTES
Subjective   History of Present Illness: Eleanor Bonds is a 45 y.o. female with a 1 month history of severe pain and numbness radiating from her left flank into her left groin area.  Patient does not recall an inciting event that started the pain, however it has progressed over the course of time and is quite severe.  The patient has been doing physical therapy without significant improvement.  Patient describes the pain is radiating from her low back around her side and into her groin.  This is associated with numbness.  The pain occurs nearly constantly and is definitely worse with normal daily activities.  No bowel or bladder issues.  No weakness.        The following portions of the patient's history were reviewed and updated as appropriate: allergies, current medications, past family history, past medical history, past social history, past surgical history and problem list.    Review of Systems   Constitutional: Negative for chills, fatigue and fever.   HENT: Negative for congestion, postnasal drip, sinus pressure and sinus pain.    Eyes: Negative for photophobia, pain and visual disturbance.   Respiratory: Negative for cough, chest tightness, shortness of breath and wheezing.    Cardiovascular: Negative for chest pain and palpitations.   Gastrointestinal: Negative for abdominal pain, constipation, diarrhea, nausea and vomiting.   Genitourinary: Negative for difficulty urinating, flank pain and pelvic pain.   Musculoskeletal: Positive for back pain and gait problem.   Skin: Negative for rash and wound.   Neurological: Negative for seizures, syncope and speech difficulty.   Psychiatric/Behavioral: Negative for agitation, behavioral problems and confusion.       Objective     .LMP 03/03/2020    There is no height or weight on file to calculate BMI.      Neurologic Exam     Mental Status   Oriented to person, place, and time.     Motor Exam     Strength   Strength 5/5 throughout.     Sensory Exam   Decreased  sensation L1 possibly L2 dermatome on the left       Assessment/Plan   Independent Review of Radiographic Studies:      I personally reviewed and interpreted the images from the following studies.    MRI lumbar spine demonstrates disc herniation at L1-2 with some superior extension of the disc as well as disc in the foramen causing severe lateral recess and moderate foraminal stenosis.  There is moderate central stenosis at L3-4.  Otherwise degenerative changes without significant central or foraminal stenosis.    Medical Decision Making:      Eleanor Bonds is a 45 y.o. female with a 1 month history of L1-2 radiculopathy and L1-2 disc herniation causing lateral recess and foraminal stenosis.  Problem has progressed over the course of time, however I reassured the patient that the majority of disc herniations do improve with conservative therapy over the course of time.  I do not recommend surgery at this time.  Patient should continue with physical therapy.  We will also set her up with left L1-2 transforaminal epidural steroid injection for treatment of her symptoms.  If the symptoms persist despite the injection or if the symptoms worsen or do not improve over the course of time patient should follow up with me.  Otherwise I will see her back as needed.      There are no diagnoses linked to this encounter.  No follow-ups on file.    This patient was examined wearing appropriate personal protective equipment.     Eleanor Bonds  reports that she has quit smoking. She has never used smokeless tobacco.. I have educated her on the risk of diseases from using tobacco products such as COPD and heart disease.                       Dex Sutherland MA  08/17/21  08:29 EDT

## 2021-08-18 ENCOUNTER — OFFICE VISIT (OUTPATIENT)
Dept: NEUROSURGERY | Facility: CLINIC | Age: 46
End: 2021-08-18

## 2021-08-18 VITALS
BODY MASS INDEX: 31.89 KG/M2 | HEIGHT: 63 IN | DIASTOLIC BLOOD PRESSURE: 99 MMHG | WEIGHT: 180 LBS | HEART RATE: 96 BPM | SYSTOLIC BLOOD PRESSURE: 145 MMHG

## 2021-08-18 DIAGNOSIS — M51.26 LUMBAR DISC HERNIATION: ICD-10-CM

## 2021-08-18 DIAGNOSIS — M48.061 SPINAL STENOSIS, LUMBAR REGION, WITHOUT NEUROGENIC CLAUDICATION: ICD-10-CM

## 2021-08-18 DIAGNOSIS — M54.16 LUMBAR RADICULOPATHY: Primary | ICD-10-CM

## 2021-08-18 PROCEDURE — 99205 OFFICE O/P NEW HI 60 MIN: CPT | Performed by: NEUROLOGICAL SURGERY

## 2021-08-19 ENCOUNTER — TREATMENT (OUTPATIENT)
Dept: PHYSICAL THERAPY | Facility: CLINIC | Age: 46
End: 2021-08-19

## 2021-08-19 DIAGNOSIS — M54.42 ACUTE LEFT-SIDED LOW BACK PAIN WITH LEFT-SIDED SCIATICA: Primary | ICD-10-CM

## 2021-08-19 PROCEDURE — 97140 MANUAL THERAPY 1/> REGIONS: CPT | Performed by: PHYSICAL THERAPIST

## 2021-08-19 PROCEDURE — 97110 THERAPEUTIC EXERCISES: CPT | Performed by: PHYSICAL THERAPIST

## 2021-08-19 PROCEDURE — 97014 ELECTRIC STIMULATION THERAPY: CPT | Performed by: PHYSICAL THERAPIST

## 2021-08-27 ENCOUNTER — TREATMENT (OUTPATIENT)
Dept: PHYSICAL THERAPY | Facility: CLINIC | Age: 46
End: 2021-08-27

## 2021-08-27 DIAGNOSIS — M54.42 ACUTE LEFT-SIDED LOW BACK PAIN WITH LEFT-SIDED SCIATICA: Primary | ICD-10-CM

## 2021-08-27 PROCEDURE — 97140 MANUAL THERAPY 1/> REGIONS: CPT | Performed by: PHYSICAL THERAPIST

## 2021-08-27 PROCEDURE — 97014 ELECTRIC STIMULATION THERAPY: CPT | Performed by: PHYSICAL THERAPIST

## 2021-08-27 PROCEDURE — 97110 THERAPEUTIC EXERCISES: CPT | Performed by: PHYSICAL THERAPIST

## 2021-08-27 NOTE — PROGRESS NOTES
Physical Therapy Daily Progress Note     Diagnosis Plan   1. Acute left-sided low back pain with left-sided sciatica         VISIT#: 3    Subjective   Eleanor Bonds reports: having legs lock up when trying to go steps 2-3 x recently; LBP about the same; epidural consult next week. IFC helped after last visit.    Objective     See Exercise, Manual, and Modality Logs for complete treatment.     Patient Education:  Access Code: [email or web address]  URL: [email or web address]  Date: 08/19/2021  Prepared by: Abdon Mauricio    Exercises  Supine Double Knee to Chest - 1 x daily - 7 x weekly - 1 sets - 10 reps - 5 sec hold  Hooklying Single Knee to Chest - 1 x daily - 7 x weekly - 1 sets - 10 reps - 5 sec hold  Supine Posterior Pelvic Tilt - 1 x daily - 7 x weekly - 2 sets - 10 reps - 3-5 sec hold    Assessment/Plan - able to tolerate 10 min on TM; reviewed HEP and continued current POC. Less pain post-treatment.     Goals  Plan Goals: SHORT TERM GOALS: Time for Goal Achievement: 4 weeks    1.  Patient to be compliant w/ the HEP and tolerate progression. MET                            2.  Pain level < 4/10 at worst with mentioned activities to improve function.  3.  Increased lumbar AROM to by 25% in all planes to allow for increased ease with sit-stand transfers and functional activities.    LONG TERM GOALS: Time for Goal Achievement: D/C  1.  Outcome survey to show significant improvement.  2.  Pain level < 1/10 with all listed activities to return to normal.  3.  Lumbar AROM to WFL to allow for return to household, work & recreational activities w/o increase in symptoms.  4.  (B) LE and lower abdominal strength to 5/5 to allow for pushing, pulling and activities to occur without pain (driving, sitting, household  & Job requirements)     Progress per Plan of Care            Timed:         Manual Therapy:    10     mins  72115;     Therapeutic Exercise:    25     mins  93972;     Neuromuscular Nell:        mins  32767;     Therapeutic Activity:         mins  19427;     Gait Training:           mins  97881;     Ultrasound:          mins  99149;    Ionto                                  mins   07927  Self Care                            mins   92349  Canalith Repos                   mins  4209  Aquatic                               mins 49591    Un-Timed:  Electrical Stimulation:    15     mins  03767 ( );  Dry Needling         mins self-pay  Traction          mins 55456  Low Eval          Mins  17871  Mod Eval          Mins  79193  High Eval                           Mins  69729  Re-Eval                               mins  77038    Timed Treatment:   35   mins   Total Treatment:     50  mins    Kalyan Mauricio PT PT, DPT, [phone]

## 2021-08-30 ENCOUNTER — TELEPHONE (OUTPATIENT)
Dept: PHYSICAL THERAPY | Facility: CLINIC | Age: 46
End: 2021-08-30

## 2021-09-01 ENCOUNTER — TREATMENT (OUTPATIENT)
Dept: PHYSICAL THERAPY | Facility: CLINIC | Age: 46
End: 2021-09-01

## 2021-09-01 DIAGNOSIS — M54.42 ACUTE LEFT-SIDED LOW BACK PAIN WITH LEFT-SIDED SCIATICA: Primary | ICD-10-CM

## 2021-09-01 DIAGNOSIS — M25.521 RIGHT ELBOW PAIN: ICD-10-CM

## 2021-09-01 PROCEDURE — 97110 THERAPEUTIC EXERCISES: CPT | Performed by: PHYSICAL THERAPIST

## 2021-09-01 NOTE — PROGRESS NOTES
Physical Therapy Daily Progress Note    VISIT#: 4    Subjective   Eleanor Bonds reports that she is sore today that she relates to the rain but is doing okay overall      Objective     See Exercise, Manual, and Modality Logs for complete treatment.         Assessment/Plan   Pt continues to progress functional core strengthening and activity tolerance with decreased rest breaks required throughout the session. LAD held today due to pain noted while performing.     Plan  Progress per Plan of Care and Progress strengthening /stabilization /functional activity            Timed:         Manual Therapy:         mins  10733;     Therapeutic Exercise:    40     mins  42716;     Neuromuscular Nell:        mins  22651;    Therapeutic Activity:          mins  07145;     Gait Training:          mins  59539;     Ultrasound:          mins  40267;    Ionto                                   mins   37801  Self Care                            mins   93425    Un-Timed:  Electrical Stimulation:         mins  47572 ( );  Dry Needling          mins self-pay  Traction          mins 86014  Low Eval          Mins  78521  Mod Eval          Mins  41715  High Eval                            Mins  10809  Re-Eval                               mins  39640    Timed Treatment:   40   mins   Total Treatment:     40   mins    Estela Garcia PT, DPT  Physical Therapist

## 2021-09-07 NOTE — PROGRESS NOTES
CHIEF COMPLAINT  Lower back pain.       Subjective   Eleanor Bonds is a 45 y.o. female who was referred by Javi Peterson IV, MD  to our pain management clinic for consultation, evaluation and treatment of lower back pain and left L1-L2 TFESI. Denies any significant injuries.  She has done physical therapy without any significant relief.    ED visit on 8/8/2021 for severe hydronephrosis due to calculus s/p stent placement.    Lower back pain is 5/10 on VAS, at maximum is 6/10. Pain is aching, dull and numbness in nature. Pain is referred to left flank, left groin area. The pain is constant. The pain is improved by nothing. The pain is worse with sitting or standing for long time, bending forward.  Also has associated numbness.    PHQ-9-   SOAPP- 10    PMH:   L4 laminectomy, kidney stones, history of hydronephrosis, cervical cancer, DVT and cardiomyopathy during pregnancy.    Current Medications:   Flexeril 10 mg 3 times daily as needed      Past Medications:    Past Modalities:  TENS:       no          Physical Therapy Within The Last 6 Months     Yes (since August, with some help).   Psychotherapy     no  Massage Therapy      no    Patient Complains Of:  Uro-Fecal Incontinence no  Weight Gain/Loss  no  Fever/Chills   no  Weakness   Yes (chronic weakness since surgery)       PEG Assessment   What number best describes your pain on average in the past week?6  What number best describes how, during the past week, pain has interfered with your enjoyment of life?6  What number best describes how, during the past week, pain has interfered with your general activity?  6        Current Outpatient Medications:   •  cyclobenzaprine (FLEXERIL) 10 MG tablet, Take 1 tablet by mouth 3 (Three) Times a Day As Needed for Muscle Spasms., Disp: 30 tablet, Rfl: 0    The following portions of the patient's history were reviewed and updated as appropriate: allergies, current medications, past family history, past medical history,  "past social history, past surgical history, and problem list.      REVIEW OF PERTINENT MEDICAL DATA    Past Medical History:   Diagnosis Date   • Cervical cancer (CMS/HCC)    • GERD (gastroesophageal reflux disease)    • Low back pain      Past Surgical History:   Procedure Laterality Date   • BACK SURGERY     • CYSTOSCOPY W/ URETERAL STENT PLACEMENT Right 8/8/2021    Procedure: CYSTOSCOPY, RIGHT URETEROSCOPY, STONE EXTRACTION, RETROGRADE AND STENT PLACEMENT RIGHT URETER (NO STRING);  Surgeon: Jose Maria Mcelroy MD;  Location: [facility];  Service: Urology;  Laterality: Right;   • HYSTERECTOMY  12/2020     Family History   Problem Relation Age of Onset   • Diabetes Mother    • Hyperlipidemia Mother    • Hypertension Mother    • Arthritis Mother    • Heart disease Father    • Hyperlipidemia Father    • Hypertension Father    • COPD Father    • Emphysema Father      Social History     Socioeconomic History   • Marital status:      Spouse name: Not on file   • Number of children: Not on file   • Years of education: Not on file   • Highest education level: Not on file   Tobacco Use   • Smoking status: Former Smoker   • Smokeless tobacco: Never Used   Vaping Use   • Vaping Use: Never used   Substance and Sexual Activity   • Alcohol use: Yes     Comment: occ   • Drug use: No     Types: Marijuana   • Sexual activity: Yes         Review of Systems   Musculoskeletal: Positive for back pain.         Vitals:    09/08/21 1040   BP: 151/93   Pulse: 91   Resp: 16   SpO2: 99%   Weight: 81.6 kg (180 lb)   Height: 160 cm (62.99\")   PainSc:   5         Objective   Physical Exam  Musculoskeletal:         General: Tenderness present.        Back:         Legs:    Neurological:      Deep Tendon Reflexes:      Reflex Scores:       Patellar reflexes are 2+ on the right side and 2+ on the left side.       Achilles reflexes are 2+ on the right side and 2+ on the left side.     Comments: Motor strength 5/5 b/l LE  Sensory intact " b/l LE except mild sensory loss in left L1, L2.              Imaging Reviewed:  MRI lumbar 7/23/2021:   Partial ankylosis noted at the L4-5 level at the left aspect of the disc space.      L1-L2:  mild bulging of the disc. Mild facet arthropathy. Negative for canal stenosis. No foraminal stenosis.     L2-L3: Mild disc bulge asymmetric to the left foraminal to left lateral region.  Mild facet arthropathy bilateral.  Mild narrowing of left lateral recess without high-grade canal stenosis.  Moderate left foraminal stenosis.    L3-L4-mild broad-based disc bulge with superimposed central protrusion which mildly narrows the left and right lateral recess.  Bilateral facet arthropathy and ligamentum flavum thickening.  This contributes to moderate central canal stenosis with AP canal diameter narrowed to 5 mm.  There is asymmetric left foraminal/lateral disc osteophyte contributing to mild left foraminal stenosis.    L4-L5-mild bilateral facet arthropathy and ligamentum flavum thickening.  Prior left hemilaminectomy.  Mild foraminal stenosis.  L4-S1-mild bulging of the disc.  Moderate left facet arthropathy.  Mild ligamentum flavum thickening left greater than right, mild left foraminal stenosis.           Assessment:    1. DDD (degenerative disc disease), lumbar    2. Lumbar spondylosis         Plan:   1. Defer UDS for now.    2. We discussed trying a course of formal physical therapy.  Physical therapy can help strengthen and stretch the muscles around the joints. Continue to be as active as possible. She is currently in PT.   3. Patient has pain in the lower back with referred pain in the leg, patient has failed conservative therapy including PT and pharmacological management for more than 6 weeks and pain interferes with activities of daily living. MRI shows disc bulge at L1-2. She has symptoms of L1-2 radiculopathy and L1-2 disc herniation. Her pain is in left L1 and L2 distribution.  Discussed Left TFESI L1/2.   Discussed the possibility of infection, bleeding, nerve damage, post dural puncture headache, increased pain, paraplegia. Patient understands and agrees.       RTC for injection and then 3 week follow up.     Melvin Cerna DO  Pain Management   [facility]         INSPECT REPORT    As part of the patient's treatment plan, I may be prescribing controlled substances. The patient has been made aware of appropriate use of such medications, including potential risk of somnolence, limited ability to drive and/or work safely, and the potential for dependence or overdose. It has also been made clear that these medications are for use by this patient only, without concomitant use of alcohol or other substances unless prescribed.     Patient has completed prescribing agreement detailing terms of continued prescribing of controlled substances, including monitoring INSPECT reports, urine drug screening, and pill counts if necessary. The patient is aware that inappropriate use will results in cessation of prescribing such medications.    INSPECT report has been reviewed and scanned into the patient's chart.      EMR Dragon/Transcription Disclaimer:   Much of this encounter note is an electronic transcription/translation of spoken language to printed text. The electronic translation of spoken language may permit erroneous, or at times, nonsensical words or phrases to be inadvertently transcribed; Although I have reviewed the note for such errors, some may still exist.

## 2021-09-08 ENCOUNTER — OFFICE VISIT (OUTPATIENT)
Dept: PAIN MEDICINE | Facility: CLINIC | Age: 46
End: 2021-09-08

## 2021-09-08 VITALS
HEART RATE: 91 BPM | SYSTOLIC BLOOD PRESSURE: 151 MMHG | OXYGEN SATURATION: 99 % | RESPIRATION RATE: 16 BRPM | WEIGHT: 180 LBS | BODY MASS INDEX: 31.89 KG/M2 | HEIGHT: 63 IN | DIASTOLIC BLOOD PRESSURE: 93 MMHG

## 2021-09-08 DIAGNOSIS — M51.36 DDD (DEGENERATIVE DISC DISEASE), LUMBAR: Primary | ICD-10-CM

## 2021-09-08 DIAGNOSIS — M47.816 LUMBAR SPONDYLOSIS: ICD-10-CM

## 2021-09-08 PROCEDURE — 99204 OFFICE O/P NEW MOD 45 MIN: CPT | Performed by: STUDENT IN AN ORGANIZED HEALTH CARE EDUCATION/TRAINING PROGRAM

## 2021-09-13 ENCOUNTER — TREATMENT (OUTPATIENT)
Dept: PHYSICAL THERAPY | Facility: CLINIC | Age: 46
End: 2021-09-13

## 2021-09-13 DIAGNOSIS — M54.42 ACUTE LEFT-SIDED LOW BACK PAIN WITH LEFT-SIDED SCIATICA: Primary | ICD-10-CM

## 2021-09-13 PROCEDURE — 97140 MANUAL THERAPY 1/> REGIONS: CPT | Performed by: PHYSICAL THERAPIST

## 2021-09-13 PROCEDURE — 97110 THERAPEUTIC EXERCISES: CPT | Performed by: PHYSICAL THERAPIST

## 2021-09-13 NOTE — PROGRESS NOTES
Physical Therapy Daily Progress Note     Diagnosis Plan   1. Acute left-sided low back pain with left-sided sciatica         VISIT#: 5    Subjective   Eleanor Bonds reports: to have lumbar epidural 9/30/21; back sore today - walked at [employer] over weekend. 40% better since therapy begining.   Pain: 4/10: central in LB, not going down LE as much.     Objective    See Exercise, Manual, and Modality Logs for complete treatment.     Patient Education: added elbow press up in prone to HEP      Assessment/Plan - able to advance HEP. STG's met. Dicussed importance of avoiding exacerbating activities; reviewed HEP.      Goals  Plan Goals: SHORT TERM GOALS: Time for Goal Achievement: 4 weeks    1.  Patient to be compliant w/ the HEP and tolerate progression. MET                            2.  Pain level < 4/10 at worst with mentioned activities to improve function. MET  3.  Increased lumbar AROM to by 25% in all planes to allow for increased ease with sit-stand transfers and functional activities. MET    LONG TERM GOALS: Time for Goal Achievement: D/C  1.  Outcome survey to show significant improvement.  2.  Pain level < 1/10 with all listed activities to return to normal.  3.  Lumbar AROM to WFL to allow for return to household, work & recreational activities w/o increase in symptoms.  4.  (B) LE and lower abdominal strength to 5/5 to allow for pushing, pulling and activities to occur without pain (driving, sitting, household  & Job requirements)     Progress per Plan of Care            Timed:         Manual Therapy:    10     mins  67356;     Therapeutic Exercise:    35     mins  82129;     Neuromuscular Nell:        mins  64931;    Therapeutic Activity:         mins  66467;     Gait Training:           mins  34051;     Ultrasound:          mins  84390;    Ionto                                  mins   98359  Self Care                            mins   20924  Canalith Repos                   mins  4209  Aquatic                                mins 36089    Un-Timed:  Electrical Stimulation:         mins  68715 ( );  Dry Needling         mins self-pay  Traction          mins 19424  Low Eval          Mins  37794  Mod Eval          Mins  95998  High Eval                           Mins  30495  Re-Eval                               mins  06886    Timed Treatment:   45   mins   Total Treatment:     45  mins    Kalyan Mauricio, PT PT, DPT, [id number]

## 2021-09-15 ENCOUNTER — TREATMENT (OUTPATIENT)
Dept: PHYSICAL THERAPY | Facility: CLINIC | Age: 46
End: 2021-09-15

## 2021-09-15 DIAGNOSIS — M54.42 ACUTE LEFT-SIDED LOW BACK PAIN WITH LEFT-SIDED SCIATICA: Primary | ICD-10-CM

## 2021-09-15 PROCEDURE — 97110 THERAPEUTIC EXERCISES: CPT | Performed by: PHYSICAL THERAPIST

## 2021-09-15 PROCEDURE — 97140 MANUAL THERAPY 1/> REGIONS: CPT | Performed by: PHYSICAL THERAPIST

## 2021-09-15 NOTE — PROGRESS NOTES
Physical Therapy Daily Progress Note     Diagnosis Plan   1. Acute left-sided low back pain with left-sided sciatica         VISIT#: 6    Subjective   Eleanor Bonds reports: much better day today and since last therapy session.  Pain: 3/10: central in LB, not going down LE as much.     Objective    See Exercise, Manual, and Modality Logs for complete treatment.     Patient Education: reviewed posture and use of lumbar support: advised to purchase a lumbar support.       Assessment/Plan - pain lessened post-therapy; good response to elbow press up (cautioned regarding stenosis and to avoid end range).    Goals  Plan Goals: SHORT TERM GOALS: Time for Goal Achievement: 4 weeks    1.  Patient to be compliant w/ the HEP and tolerate progression. MET                            2.  Pain level < 4/10 at worst with mentioned activities to improve function. MET  3.  Increased lumbar AROM to by 25% in all planes to allow for increased ease with sit-stand transfers and functional activities. MET    LONG TERM GOALS: Time for Goal Achievement: D/C  1.  Outcome survey to show significant improvement.  2.  Pain level < 1/10 with all listed activities to return to normal.  3.  Lumbar AROM to WFL to allow for return to household, work & recreational activities w/o increase in symptoms.  4.  (B) LE and lower abdominal strength to 5/5 to allow for pushing, pulling and activities to occur without pain (driving, sitting, household  & Job requirements)     Progress per Plan of Care            Timed:         Manual Therapy:    10     mins  08204;     Therapeutic Exercise:    35     mins  50687;     Neuromuscular Nell:        mins  64211;    Therapeutic Activity:         mins  17860;     Gait Training:           mins  27812;     Ultrasound:          mins  20609;    Ionto                                  mins   12002  Self Care                            mins   55327  Canalith Repos                   mins  4209  Aquatic                                mins 84829    Un-Timed:  Electrical Stimulation:         mins  85056 ( );  Dry Needling         mins self-pay  Traction          mins 34985  Low Eval          Mins  81611  Mod Eval          Mins  54944  High Eval                           Mins  90681  Re-Eval                               mins  13329    Timed Treatment:   45   mins   Total Treatment:     45  mins    Kalyan Mauricio, PT PT, DPT, [id number]

## 2021-09-22 ENCOUNTER — TREATMENT (OUTPATIENT)
Dept: PHYSICAL THERAPY | Facility: CLINIC | Age: 46
End: 2021-09-22

## 2021-09-22 DIAGNOSIS — M54.42 ACUTE LEFT-SIDED LOW BACK PAIN WITH LEFT-SIDED SCIATICA: Primary | ICD-10-CM

## 2021-09-22 PROCEDURE — 97140 MANUAL THERAPY 1/> REGIONS: CPT | Performed by: PHYSICAL THERAPIST

## 2021-09-22 PROCEDURE — 97110 THERAPEUTIC EXERCISES: CPT | Performed by: PHYSICAL THERAPIST

## 2021-09-22 NOTE — PROGRESS NOTES
Physical Therapy Daily Progress Note     Diagnosis Plan   1. Acute left-sided low back pain with left-sided sciatica         VISIT#: 7    Subjective   Eleanor Bonds reports: improving a little; back achy with rain nd weather.  Pain: 3/10: central in LB, not going down LE as much.     Objective    See Exercise, Manual, and Modality Logs for complete treatment.     Patient Education: reviewed posture and use of lumbar support: advised to purchase a lumbar support.   Access Code: [email or web address]  URL: [email or web address]  Date: 09/22/2021  Prepared by: Abdon Mauricio    Exercises  Hip Flexor Stretch at Edge of Bed - 1 x daily - 5 x weekly - 1 sets - 3 reps - 20 sec hold      Assessment/Plan - less pain post-TM; updated HEP to address tight hip flexors. Decreased pain in decompression 90/90 position.    Goals  Plan Goals: SHORT TERM GOALS: Time for Goal Achievement: 4 weeks    1.  Patient to be compliant w/ the HEP and tolerate progression. MET                            2.  Pain level < 4/10 at worst with mentioned activities to improve function. MET  3.  Increased lumbar AROM to by 25% in all planes to allow for increased ease with sit-stand transfers and functional activities. MET    LONG TERM GOALS: Time for Goal Achievement: D/C  1.  Outcome survey to show significant improvement.  2.  Pain level < 1/10 with all listed activities to return to normal.  3.  Lumbar AROM to WFL to allow for return to household, work & recreational activities w/o increase in symptoms.  4.  (B) LE and lower abdominal strength to 5/5 to allow for pushing, pulling and activities to occur without pain (driving, sitting, household  & Job requirements)     Progress per Plan of Care            Timed:         Manual Therapy:    10     mins  55176;     Therapeutic Exercise:    35     mins  97053;     Neuromuscular Nell:        mins  63869;    Therapeutic Activity:         mins  10033;     Gait Training:           mins  32139;     Ultrasound:           mins  00351;    Ionto                                  mins   00299  Self Care                            mins   61793  Canalith Repos                   mins  4209  Aquatic                               mins 11407    Un-Timed:  Electrical Stimulation:         mins  96881 ( );  Dry Needling         mins self-pay  Traction          mins 59482  Low Eval          Mins  15946  Mod Eval          Mins  65390  High Eval                           Mins  33600  Re-Eval                               mins  45074    Timed Treatment:   45   mins   Total Treatment:     45  mins    Kalyan Mauricio PT PT, DPT, [id number]

## 2021-09-24 ENCOUNTER — TREATMENT (OUTPATIENT)
Dept: PHYSICAL THERAPY | Facility: CLINIC | Age: 46
End: 2021-09-24

## 2021-09-24 DIAGNOSIS — M54.42 ACUTE LEFT-SIDED LOW BACK PAIN WITH LEFT-SIDED SCIATICA: Primary | ICD-10-CM

## 2021-09-24 PROCEDURE — 97110 THERAPEUTIC EXERCISES: CPT | Performed by: PHYSICAL THERAPIST

## 2021-09-24 PROCEDURE — 97140 MANUAL THERAPY 1/> REGIONS: CPT | Performed by: PHYSICAL THERAPIST

## 2021-09-24 NOTE — PROGRESS NOTES
Physical Therapy Daily Progress Note     Diagnosis Plan   1. Acute left-sided low back pain with left-sided sciatica         VISIT#: 8    Subjective   Eleanor Bonds reports: therapy helping with less pain;weather seems to affect back pain.       Objective    See Exercise, Manual, and Modality Logs for complete treatment.     Patient Education:     Assessment/Plan - good tolerance to exercise - advanced stabilization exercises. Less radicular symptoms.     Goals  Plan Goals: SHORT TERM GOALS: Time for Goal Achievement: 4 weeks    1.  Patient to be compliant w/ the HEP and tolerate progression. MET                            2.  Pain level < 4/10 at worst with mentioned activities to improve function. MET  3.  Increased lumbar AROM to by 25% in all planes to allow for increased ease with sit-stand transfers and functional activities. MET    LONG TERM GOALS: Time for Goal Achievement: D/C  1.  Outcome survey to show significant improvement.  2.  Pain level < 1/10 with all listed activities to return to normal.  3.  Lumbar AROM to WFL to allow for return to household, work & recreational activities w/o increase in symptoms.  4.  (B) LE and lower abdominal strength to 5/5 to allow for pushing, pulling and activities to occur without pain (driving, sitting, household  & Job requirements)     Progress per Plan of Care            Timed:         Manual Therapy:    10     mins  41735;     Therapeutic Exercise:    30     mins  65920;     Neuromuscular Nell:        mins  80884;    Therapeutic Activity:         mins  94671;     Gait Training:           mins  80450;     Ultrasound:          mins  44313;    Ionto                                  mins   89120  Self Care                            mins   68797  Canalith Repos                   mins  4209  Aquatic                               mins 49837    Un-Timed:  Electrical Stimulation:         mins  82368 ( );  Dry Needling         mins self-pay  Traction          mins  42899  Low Eval          Mins  11278  Mod Eval          Mins  23210  High Eval                           Mins  72309  Re-Eval                               mins  98855    Timed Treatment:   40   mins   Total Treatment:     40  mins    Kalyan Mauricio PT PT, DPT, [id number]

## 2021-09-30 ENCOUNTER — HOSPITAL ENCOUNTER (OUTPATIENT)
Dept: PAIN MEDICINE | Facility: HOSPITAL | Age: 46
Discharge: HOME | End: 2021-09-30

## 2021-09-30 VITALS
WEIGHT: 180 LBS | DIASTOLIC BLOOD PRESSURE: 80 MMHG | SYSTOLIC BLOOD PRESSURE: 117 MMHG | HEART RATE: 66 BPM | RESPIRATION RATE: 16 BRPM | HEIGHT: 63 IN | OXYGEN SATURATION: 97 % | TEMPERATURE: 97.5 F | BODY MASS INDEX: 31.89 KG/M2

## 2021-09-30 DIAGNOSIS — R52 PAIN: ICD-10-CM

## 2021-09-30 DIAGNOSIS — M51.36 DDD (DEGENERATIVE DISC DISEASE), LUMBAR: Primary | ICD-10-CM

## 2021-09-30 PROCEDURE — 25010000002 DEXAMETHASONE SODIUM PHOSPHATE 10 MG/ML SOLUTION: Performed by: STUDENT IN AN ORGANIZED HEALTH CARE EDUCATION/TRAINING PROGRAM

## 2021-09-30 PROCEDURE — 77003 FLUOROGUIDE FOR SPINE INJECT: CPT

## 2021-09-30 PROCEDURE — 0 IOPAMIDOL 41 % SOLUTION: Performed by: STUDENT IN AN ORGANIZED HEALTH CARE EDUCATION/TRAINING PROGRAM

## 2021-09-30 PROCEDURE — 64484 NJX AA&/STRD TFRM EPI L/S EA: CPT | Performed by: STUDENT IN AN ORGANIZED HEALTH CARE EDUCATION/TRAINING PROGRAM

## 2021-09-30 PROCEDURE — 64483 NJX AA&/STRD TFRM EPI L/S 1: CPT | Performed by: STUDENT IN AN ORGANIZED HEALTH CARE EDUCATION/TRAINING PROGRAM

## 2021-09-30 RX ORDER — DEXAMETHASONE SODIUM PHOSPHATE 10 MG/ML
10 INJECTION, SOLUTION INTRAMUSCULAR; INTRAVENOUS ONCE
Status: COMPLETED | OUTPATIENT
Start: 2021-09-30 | End: 2021-09-30

## 2021-09-30 RX ADMIN — DEXAMETHASONE SODIUM PHOSPHATE 10 MG: 10 INJECTION, SOLUTION INTRAMUSCULAR; INTRAVENOUS at 12:00

## 2021-09-30 RX ADMIN — IOPAMIDOL 3 ML: 408 INJECTION, SOLUTION INTRATHECAL at 11:59

## 2021-09-30 NOTE — PROCEDURES
Procedure: LEFT L1, L2 TFESI    PREOPERATIVE DIAGNOSIS:  Lumbar Spinal Stenosis  Lumbar DDD    POSTOPERATIVE DIAGNOSIS:  Same     PROCEDURE NOTE:  After obtaining written informed consent patient was taken to the procedure room. Pre-procedure blood pressure and pulse were stable and recorded in patients clinic chart.     The patient was placed in a prone position and LEFT lumbar area was prepped with chloraprep times three and draped in the usual sterile fashion.  Under fluoroscopic guidance LEFT L1 neural foramen was identified. 2 ml of 1% lidocaine was used to anesthetize the skin. A 22-gauge 5-inch spinal needle was inserted through the skin. The needle was placed in L1 neural foramina under fluoroscopic control. Placement was confirmed under oblique, AP and lateral fluoroscopic view.  2 ml of omnipaque dye was injected and showed good spread of the dye in the nerve root and the epidural space.  5 mg dexamethasone and 2 cc of saline was injected. Similar procedure was performed on LEFT L2 neural foramen.  Confirmation was done by injecting the dye. 5 mg dexamethasonel and 2 cc of saline was injected.     After the procedure the needle was flushed and was removed. Skin was cleaned and a sterile dressing was applied.    Following the procedure the patient's vital signs were stable. The patient was discharged home in good condition after being given discharge instructions.    COMPLICATIONS None    Melvin Cerna DO  Pain Management   [facility]

## 2021-09-30 NOTE — H&P
H and P reviewed from previous visit and no changes to patient's clinical presentation. Will proceed with procedure as planned. Patient denies history of DM and being on blood thinners.    Melvin Cerna DO  Pain Management   [facility]     
Cognitively Impaired

## 2021-09-30 NOTE — DISCHARGE INSTRUCTIONS
EPIDURAL STEROID INJECTION          An epidural steroid injection is a shot of steroid medicine and numbing medicine that is given into the space between the spinal cord and the bones of the back (epidural space).  The injection helps relieve pain by an irritated or swollen nerve root.    TELL YOUR HEALTH CARE PROVIDER ABOUT:  • Any allergies you have  • All medicines you are taking including any over the counter medicines  • Any blood disorders you have  • Any surgeries you have had  • Any medical conditions you have  • Whether you are pregnant or may be pregnant    WHAT ARE THE RISK?  Generally, this is a safe procedure. However,problems may occur, including  • Headache  • Bleeding  • Infection  • Allergic Reaction  • Nerve Damage    WHAT CAN I EXPECT AFTER THE PROCEDURE?    INJECTION SITE  • Remove the Band-Aid/s after 24 hours  • Check your injection site every day for signs of infection.  Check for:             Redness             Bleeding (small amt is normal)             Warmth             Pus or bad odor  • Some numbness may be experienced for several hours following the procedure.  • Avoid using heat on the injection site for 24 hours. You may use ice intermittently if needed by placing a         towel between your skin and the ice bag and using the ice for 20 minutes 2-3 times a day.  • Do not take baths, swim or use a hot tub for 24 hours.    ACTIVITY  • No strenuous activity for 24 hours then return to normal activity as tolerated.  • If your leg is numb, no driving until full sensation and strength has returned.    GENERAL INSTRUCTIONS:  • The injection site may feel numb, use ice with caution if numbness is present and no heat for 24 hours or until numbness is gone.   • If you have numbness or weakness in your arm or leg, use those areas with caution until normal sensation returns.  • It is not uncommon to notice an increase in discomfort or a change in the location of discomfort for 3-4 days after  the procedure.  If discomfort is noticed at the injection site, ice may be            applied to that area for 20 min 2-3 times a day.  • Take the pain medicine your physician has prescribed or over the counter pain relievers as long as you do not have any contraindications.  • If you are a diabetic, monitor your blood sugar closely.  The steroids used in your procedure may increase your blood sugar level up to 36 hours after the injection.  If your blood sugar is greater than 250, call the physician that helps you monitor your blood sugar.  • Keep all follow-up visits as scheduled by your health care provider. This is important.    CONTACT OUR OFFICE IF:  • You have any of these signs of infection            -Redness, swelling, or warmth around your injection site.            -Fluid or blood coming from your injection site (small amt of blood is normal)            -Pus or a bad odor from your injection site            -A fever  • You develop a severe headache or a stiff neck  • You lose control of your bladder or bowel movements      PAIN MANAGEMENT CENTER HOURS   • Monday-Friday 7:30 am. - 4:00 pm.  For any problem related to your procedure we can be reached at [phone]  • If you experience an emergency with your procedure, call [phone] or go to the emergency room.

## 2021-10-08 ENCOUNTER — TELEPHONE (OUTPATIENT)
Dept: PHYSICAL THERAPY | Facility: CLINIC | Age: 46
End: 2021-10-08

## 2021-10-11 NOTE — PROGRESS NOTES
Subjective   Eleanor Bonds is a 45 y.o. female is here for follow up for lower back pain. Patient was last seen on 9/30/2021 for Left L1, L2 TFESI with 60% pain relief. Her radicular pain to left groin is resolved completely. She still has some left sided buttock and hip pain.      On last visit:     Lower back pain is 6/10 on VAS, at maximum 6/10.  Pain is aching, dull, numbness in nature.  Pain is referred to left flank, left hip and buttock.  Pain is constant.  Pain is improved by TFESI.  Worse with sitting or standing for long time, bending forward.  Also associated with numbness.    Previous Injection:   9/30/2021 - Left L1, L2 TFESI - 60% pain relief.     Hx: Referred by Dr. Campos, Javi ALVAREZ MD left L1-L2 TFESI. Denies any significant injuries.  She has done physical therapy without any significant relief.     PHQ-9-   SOAPP- 10     PMH:   L4 laminectomy, kidney stones, history of hydronephrosis, cervical cancer, DVT and cardiomyopathy during pregnancy.     Current Medications:   Flexeril 10 mg 3 times daily as needed        Past Medications:     Past Modalities:  TENS:                                                                          no                                                  Physical Therapy Within The Last 6 Months              Yes (since August, with some help).   Psychotherapy                                                            no  Massage Therapy                                                       no     Patient Complains Of:  Uro-Fecal Incontinence          no  Weight Gain/Loss                   no  Fever/Chills                             no  Weakness                               Yes (chronic weakness since surgery)             Current Outpatient Medications:   •  cyclobenzaprine (FLEXERIL) 10 MG tablet, Take 1 tablet by mouth 3 (Three) Times a Day As Needed for Muscle Spasms., Disp: 30 tablet, Rfl: 0    The following portions of the patient's history were reviewed and  "updated as appropriate: allergies, current medications, past family history, past medical history, past social history, past surgical history, and problem list.      REVIEW OF PERTINENT MEDICAL DATA    Past Medical History:   Diagnosis Date   • Cervical cancer (HCC)    • GERD (gastroesophageal reflux disease)    • Low back pain      Past Surgical History:   Procedure Laterality Date   • BACK SURGERY     • CYSTOSCOPY W/ URETERAL STENT PLACEMENT Right 8/8/2021    Procedure: CYSTOSCOPY, RIGHT URETEROSCOPY, STONE EXTRACTION, RETROGRADE AND STENT PLACEMENT RIGHT URETER (NO STRING);  Surgeon: Jose Maria Mcelroy MD;  Location: [facility];  Service: Urology;  Laterality: Right;   • HYSTERECTOMY  12/2020     Family History   Problem Relation Age of Onset   • Diabetes Mother    • Hyperlipidemia Mother    • Hypertension Mother    • Arthritis Mother    • Heart disease Father    • Hyperlipidemia Father    • Hypertension Father    • COPD Father    • Emphysema Father      Social History     Socioeconomic History   • Marital status:    Tobacco Use   • Smoking status: Former Smoker   • Smokeless tobacco: Never Used   Vaping Use   • Vaping Use: Never used   Substance and Sexual Activity   • Alcohol use: Yes     Comment: occ   • Drug use: No     Types: Marijuana   • Sexual activity: Yes         Review of Systems   Musculoskeletal: Positive for back pain.         Vitals:    10/13/21 1045   BP: (!) 152/104   Pulse: 76   Resp: 16   SpO2: 100%   Weight: 81.6 kg (180 lb)   Height: 160 cm (62.99\")   PainSc:   6         Objective   Physical Exam  Musculoskeletal:         General: Tenderness present.        Legs:    Neurological:      Deep Tendon Reflexes:      Reflex Scores:       Patellar reflexes are 2+ on the right side and 2+ on the left side.       Achilles reflexes are 2+ on the right side and 2+ on the left side.     Comments: Motor strength 5/5 b/l LE  Sensory intact b/l LE except mild sensory loss in left L1, L2.    "           Imaging Reviewed:  MRI lumbar 7/23/2021:   Partial ankylosis noted at the L4-5 level at the left aspect of the disc space.       L1-L2:  mild bulging of the disc. Mild facet arthropathy. Negative for canal stenosis. No foraminal stenosis.     L2-L3: Mild disc bulge asymmetric to the left foraminal to left lateral region.  Mild facet arthropathy bilateral.  Mild narrowing of left lateral recess without high-grade canal stenosis.  Moderate left foraminal stenosis.     L3-L4-mild broad-based disc bulge with superimposed central protrusion which mildly narrows the left and right lateral recess.  Bilateral facet arthropathy and ligamentum flavum thickening.  This contributes to moderate central canal stenosis with AP canal diameter narrowed to 5 mm.  There is asymmetric left foraminal/lateral disc osteophyte contributing to mild left foraminal stenosis.     L4-L5-mild bilateral facet arthropathy and ligamentum flavum thickening.  Prior left hemilaminectomy.  Mild foraminal stenosis.  L4-S1-mild bulging of the disc.  Moderate left facet arthropathy.  Mild ligamentum flavum thickening left greater than right, mild left foraminal stenosis.      Assessment:    1. Sacroiliac joint dysfunction    2. DDD (degenerative disc disease), lumbar         1. Defer UDS for now.    2. We discussed trying a course of formal physical therapy.  Physical therapy can help strengthen and stretch the muscles around the joints. Continue to be as active as possible. She is currently in PT.   3. Good relief from left-sided TFESI L1, L2 with resolution of groin and most of the flank pain. She continues to have left buttock pain which worsens with sitting for prolonged period of time. She has significant SI joint tenderness on left side. I believe most of her pain at this point is coming from left SI joint. Susy test, SI joint compression and thigh thurst test were performed and were positive for SI joint pathology. Discussed left SI  joint injection under fluoro, Discussed the possibility of infection, bleeding, nerve damage, headache, increased pain, paraplegia. Patient understands and agrees.       RTC for injection and then 3 week follow up.      Melvin Cerna DO  Pain Management   [facility]            INSPECT REPORT    As part of the patient's treatment plan, I may be prescribing controlled substances. The patient has been made aware of appropriate use of such medications, including potential risk of somnolence, limited ability to drive and/or work safely, and the potential for dependence or overdose. It has also been made clear that these medications are for use by this patient only, without concomitant use of alcohol or other substances unless prescribed.     Patient has completed prescribing agreement detailing terms of continued prescribing of controlled substances, including monitoring INSPECT reports, urine drug screening, and pill counts if necessary. The patient is aware that inappropriate use will results in cessation of prescribing such medications.    INSPECT report has been reviewed and scanned into the patient's chart.

## 2021-10-13 ENCOUNTER — OFFICE VISIT (OUTPATIENT)
Dept: PAIN MEDICINE | Facility: CLINIC | Age: 46
End: 2021-10-13

## 2021-10-13 VITALS
BODY MASS INDEX: 31.89 KG/M2 | OXYGEN SATURATION: 100 % | SYSTOLIC BLOOD PRESSURE: 152 MMHG | WEIGHT: 180 LBS | HEART RATE: 76 BPM | HEIGHT: 63 IN | RESPIRATION RATE: 16 BRPM | DIASTOLIC BLOOD PRESSURE: 104 MMHG

## 2021-10-13 DIAGNOSIS — M51.36 DDD (DEGENERATIVE DISC DISEASE), LUMBAR: ICD-10-CM

## 2021-10-13 DIAGNOSIS — M53.3 SACROILIAC JOINT DYSFUNCTION: Primary | ICD-10-CM

## 2021-10-13 PROCEDURE — 99214 OFFICE O/P EST MOD 30 MIN: CPT | Performed by: STUDENT IN AN ORGANIZED HEALTH CARE EDUCATION/TRAINING PROGRAM

## 2021-10-20 ENCOUNTER — DOCUMENTATION (OUTPATIENT)
Dept: PHYSICAL THERAPY | Facility: CLINIC | Age: 46
End: 2021-10-20

## 2021-10-20 NOTE — PROGRESS NOTES
Discharge Summary  Discharge Summary from Physical Therapy Report    Patient: Eleanor Bonds   : 1975  Diagnosis/ICD-10 Code:  Acute left-sided low back pain with left-sided sciatica [M54.42]  Referring practitioner: HOLLEY Valdez  Date of Initial Visit: 2021      Dates  PT visit: 21 to 21  Number of Visits: 8    Discharge Status of Patient: patient with marginal relief from therapy; No show on last 2 visits - has been discharged per dept policy.     Goals: Partially Met    Discharge Plan: Future need for rehabilitation activities  Patient to return to referring/providing physician      Date of Discharge 10/20/21        Kalyan Mauricio, PT  Physical Therapist

## 2021-10-28 ENCOUNTER — HOSPITAL ENCOUNTER (OUTPATIENT)
Dept: PAIN MEDICINE | Facility: HOSPITAL | Age: 46
Discharge: HOME | End: 2021-10-28

## 2021-10-28 VITALS
TEMPERATURE: 97.1 F | HEIGHT: 63 IN | DIASTOLIC BLOOD PRESSURE: 93 MMHG | BODY MASS INDEX: 31.89 KG/M2 | WEIGHT: 180 LBS | RESPIRATION RATE: 16 BRPM | HEART RATE: 67 BPM | SYSTOLIC BLOOD PRESSURE: 152 MMHG | OXYGEN SATURATION: 98 %

## 2021-10-28 DIAGNOSIS — R52 PAIN: ICD-10-CM

## 2021-10-28 DIAGNOSIS — M53.3 SACROILIAC JOINT DYSFUNCTION: ICD-10-CM

## 2021-10-28 PROCEDURE — 27096 INJECT SACROILIAC JOINT: CPT | Performed by: STUDENT IN AN ORGANIZED HEALTH CARE EDUCATION/TRAINING PROGRAM

## 2021-10-28 PROCEDURE — 77003 FLUOROGUIDE FOR SPINE INJECT: CPT

## 2021-10-28 RX ORDER — METHYLPREDNISOLONE ACETATE 40 MG/ML
40 INJECTION, SUSPENSION INTRA-ARTICULAR; INTRALESIONAL; INTRAMUSCULAR; SOFT TISSUE ONCE
Status: DISCONTINUED | OUTPATIENT
Start: 2021-10-28 | End: 2021-12-16

## 2021-10-28 RX ORDER — BUPIVACAINE HYDROCHLORIDE 2.5 MG/ML
10 INJECTION, SOLUTION EPIDURAL; INFILTRATION; INTRACAUDAL ONCE
Status: DISCONTINUED | OUTPATIENT
Start: 2021-10-28 | End: 2021-10-29 | Stop reason: HOSPADM

## 2021-10-28 NOTE — H&P
H and P reviewed from previous visit and no changes to patient's clinical presentation. Will proceed with procedure as planned. Patient denies history of DM and being on blood thinners.    Melvin Cerna DO  Pain Management   [facility]

## 2021-10-28 NOTE — PROCEDURES
PREOPERATIVE DIAGNOSIS:    Left SI Joint Arthropathy      POSTOPERATIVE DIAGNOSIS:  Same.     PROCEDURE: Left sacroiliac joint steroid injection     PROCEDURE IN DETAIL:  The patient was placed in a prone position and the lower back was prepped with chloraprep and draped in the usual sterile fashion.  The skin overlaying the left SI joint was infiltrated with 1% lidocaine for local anesthesia.  A 22-gauge 3.5 inch spinal needle was inserted through the skin under fluoroscopic guidance until we got to the lower third of the SI joint. Another needle was placed in alayna upper third of the joint. 2 cc of 0.25% marcaine with depo-medrol was injected at each level. A total of 4 cc of 0.25% marcaine with 80 mg of depo-medrol was injected.     After the procedure the needles were flushed with preservative free local anesthetic and removed. Skin was cleaned and a sterile dressing was applied.    Following the procedure the patient's vital signs were stable. The patient was discharged home in good condition after being given discharge instructions.    COMPLICATIONS: None    Melvin Cerna DO  Pain Management   [facility]

## 2021-10-28 NOTE — DISCHARGE INSTRUCTIONS
Sacroiliac Joint Injection, Care After  This sheet gives you information about how to care for yourself after your procedure. Your health care provider may also give you more specific instructions. If you have problems or questions, contact your health care provider.  What can I expect after the procedure?  After the procedure, it is common to have bruising or soreness at the injection site.  Follow these instructions at home:  Managing pain and swelling         · Keep a record of your pain (a pain log) to share with your health care provider at your follow-up visit. If a long-acting anti-inflammatory medicine (steroid) was included in your injection, you may not notice an improvement in your pain level for a few days.  · Do not use a heating pad, and do not apply heat directly to the area after the procedure.  · Ask your health care provider about using ice therapy.  · If directed, put ice on the affected area. To do this:  ? Put ice in a plastic bag.  ? Place a towel between your skin and the bag.  ? Leave the ice on for 20 minutes, 2-3 times a day.  ? Remove the ice if your skin turns bright red. This is very important. If you cannot feel pain, heat, or cold, you have a greater risk of damage to the area.  Injection site care  · Check your injection site every day for signs of infection. Check for:  ? Redness, swelling, or pain.  ? Fluid or blood.  ? Warmth.  ? Pus or a bad smell.  · Do not take baths, swim, or use a hot tub until your health care provider says that it is safe. You may take showers.  Activity  · Rest on the day of your procedure. Avoid doing a lot of activity on that day.  · Avoid any activities that take a lot of effort for 24 hours after the injection.  · Return to your normal activities as told by your health care provider. Ask your health care provider what activities are safe for you.  · Do physical therapy exercises as told by your health care provider or physical therapist. These exercises  are used to strengthen muscles surrounding the SI joint, and that will help relieve pain.  General instructions  · Take over-the-counter and prescription medicines only as told by your health care provider.  · Since dye was used during your procedure, drink plenty of water to flush the dye out of your body.  · If you were given a sedative during the procedure, it can affect you for several hours. Do not drive or operate machinery until your health care provider says that it is safe.  · Keep all follow-up visits. This is important.  Contact a health care provider if:  · Your pain does not improve or it gets worse.  · You have numbness, tingling, or weakness.  · You have a fever or chills.  · You have redness, swelling, or pain around your injection site.  · You have fluid or blood coming from your injection site.  · Your injection site feels warm to the touch.  · You have pus or a bad smell coming from your injection site.  Get help right away if:  · You have chest pain or shortness of breath.  These symptoms may represent a serious problem that is an emergency. Do not wait to see if the symptoms will go away. Get medical help right away. Call your local emergency services (911 in the [place] Do not drive yourself to the hospital.  Summary  · After the procedure, it is common to have bruising or soreness at the injection site.  · Keep a record of your pain (a pain log) to share with your health care provider at your follow-up visit.  · Return to your normal activities as told by your health care provider. Ask your health care provider what activities are safe for you.  · Contact your health care provider if you have pain that is getting worse, weakness, numbness, or any sign of infection at your injection site.  This information is not intended to replace advice given to you by your health care provider. Make sure you discuss any questions you have with your health care provider.  Document Revised: 04/29/2021 Document  Reviewed: 04/29/2021  Elsevier Patient Education © 2021 [employer]

## 2021-11-16 NOTE — PROGRESS NOTES
Subjective   Eleanor Bonds is a 45 y.o. female is here for follow-up for lower back pain.  She was last seen on 10/28/2021 for left SI joint injection with with no relief. She has seen Neurosurg since then who recommended interlaminar epidural and if no relief new MRI.     On last visit:     Lower back pain is 7/10 on VAS, at maximum 8/10.  Pain is aching, dull, numbness in nature.  Pain is referred to left lower back, left hip, buttock.  Pain is constant.  Improved by TFESI.  Worse with sitting or standing for long time, bending forward.  Also associated with numbness.      Previous Injection:   10/28/2021 - L SI joint injection - no relief.   9/30/2021 - Left L1, L2 TFESI - 60% pain relief.     Hx: Referred by Dr. Campos, Javi ALVAREZ MD left L1-L2 TFESI. Denies any significant injuries.  She has done physical therapy without any significant relief.     PHQ-9-   SOAPP- 10     PMH:   L4 laminectomy, kidney stones, history of hydronephrosis, cervical cancer, DVT and cardiomyopathy during pregnancy.     Current Medications:   Flexeril 10 mg 3 times daily as needed        Past Medications:     Past Modalities:  TENS:                                                                          no                                                  Physical Therapy Within The Last 6 Months              Yes (since August, with some help).   Psychotherapy                                                            no  Massage Therapy                                                       no     Patient Complains Of:  Uro-Fecal Incontinence          no  Weight Gain/Loss                   no  Fever/Chills                             no  Weakness                               Yes (chronic weakness since surgery)             Current Outpatient Medications:   •  cyclobenzaprine (FLEXERIL) 10 MG tablet, Take 1 tablet by mouth 3 (Three) Times a Day As Needed for Muscle Spasms., Disp: 30 tablet, Rfl: 0  •  ibuprofen (ADVIL,MOTRIN) 200 MG  "tablet, Take 200 mg by mouth Every 6 (Six) Hours As Needed for Mild Pain ., Disp: , Rfl:     Current Facility-Administered Medications:   •  methylPREDNISolone acetate (DEPO-medrol) injection 40 mg, 40 mg, Intra-articular, Once, Melvin Cerna DO    The following portions of the patient's history were reviewed and updated as appropriate: allergies, current medications, past family history, past medical history, past social history, past surgical history, and problem list.      REVIEW OF PERTINENT MEDICAL DATA    Past Medical History:   Diagnosis Date   • Cervical cancer (HCC)    • GERD (gastroesophageal reflux disease)    • Low back pain      Past Surgical History:   Procedure Laterality Date   • BACK SURGERY     • CYSTOSCOPY W/ URETERAL STENT PLACEMENT Right 8/8/2021    Procedure: CYSTOSCOPY, RIGHT URETEROSCOPY, STONE EXTRACTION, RETROGRADE AND STENT PLACEMENT RIGHT URETER (NO STRING);  Surgeon: Jose Maria Mcelroy MD;  Location: [facility];  Service: Urology;  Laterality: Right;   • HYSTERECTOMY  12/2020     Family History   Problem Relation Age of Onset   • Diabetes Mother    • Hyperlipidemia Mother    • Hypertension Mother    • Arthritis Mother    • Heart disease Father    • Hyperlipidemia Father    • Hypertension Father    • COPD Father    • Emphysema Father      Social History     Socioeconomic History   • Marital status:    Tobacco Use   • Smoking status: Former Smoker   • Smokeless tobacco: Never Used   Vaping Use   • Vaping Use: Never used   Substance and Sexual Activity   • Alcohol use: Yes     Comment: occ   • Drug use: No     Types: Marijuana   • Sexual activity: Yes         Review of Systems   Musculoskeletal: Positive for back pain.         Vitals:    11/17/21 1309   BP: 136/76   Pulse: 75   Resp: 16   SpO2: 99%   Weight: 83.5 kg (184 lb)   Height: 157.5 cm (62\")   PainSc:   7         Objective   Physical Exam  Musculoskeletal:         General: Tenderness present.        Legs:    Neurological: "      Deep Tendon Reflexes:      Reflex Scores:       Patellar reflexes are 2+ on the right side and 2+ on the left side.       Achilles reflexes are 2+ on the right side and 2+ on the left side.     Comments: Motor strength 5/5 b/l LE  Sensory intact b/l LE except mild sensory loss in left L1, L2.              Imaging Reviewed:  MRI lumbar 7/23/2021:   Partial ankylosis noted at the L4-5 level at the left aspect of the disc space.       L1-L2:  mild bulging of the disc. Mild facet arthropathy. Negative for canal stenosis. No foraminal stenosis.     L2-L3: Mild disc bulge asymmetric to the left foraminal to left lateral region.  Mild facet arthropathy bilateral.  Mild narrowing of left lateral recess without high-grade canal stenosis.  Moderate left foraminal stenosis.     L3-L4-mild broad-based disc bulge with superimposed central protrusion which mildly narrows the left and right lateral recess.  Bilateral facet arthropathy and ligamentum flavum thickening.  This contributes to moderate central canal stenosis with AP canal diameter narrowed to 5 mm.  There is asymmetric left foraminal/lateral disc osteophyte contributing to mild left foraminal stenosis.     L4-L5-mild bilateral facet arthropathy and ligamentum flavum thickening.  Prior left hemilaminectomy.  Mild foraminal stenosis.  L4-S1-mild bulging of the disc.  Moderate left facet arthropathy.  Mild ligamentum flavum thickening left greater than right, mild left foraminal stenosis.      Assessment:    1. DDD (degenerative disc disease), lumbar    2. Sacroiliac joint dysfunction    3. Lumbar spondylosis         1. Defer UDS for now.    2. We discussed trying a course of formal physical therapy.  Physical therapy can help strengthen and stretch the muscles around the joints. Continue to be as active as possible. She has finished 6 weeks of PT.   3. Good relief from left-sided TFESI L1, L2 with resolution of groin and most of the flank pain. She continues to have  left lower back and some radiating pain down the thigh. She has disc bulge at L1-2 and L2-3 and will benefit from interlaminar LESI L1-2.  Discussed the possibility of infection, bleeding, nerve damage, post dural puncture headache, increased pain, paraplegia. Patient understands and agrees.        RTC for injection and then 3 week follow up.      Melvin Cerna DO  Pain Management   [facility]            INSPECT REPORT    As part of the patient's treatment plan, I may be prescribing controlled substances. The patient has been made aware of appropriate use of such medications, including potential risk of somnolence, limited ability to drive and/or work safely, and the potential for dependence or overdose. It has also been made clear that these medications are for use by this patient only, without concomitant use of alcohol or other substances unless prescribed.     Patient has completed prescribing agreement detailing terms of continued prescribing of controlled substances, including monitoring INSPECT reports, urine drug screening, and pill counts if necessary. The patient is aware that inappropriate use will results in cessation of prescribing such medications.    INSPECT report has been reviewed and scanned into the patient's chart.

## 2021-11-16 NOTE — PROGRESS NOTES
"Subjective   History of Present Illness: Eleanor Bonds is a 45 y.o. female is here today for follow-up after injections.  Patient said the initial injection for her L1-2 disc herniation significantly improved her symptoms radiating into her groin area.  Subsequently she developed more pain into her lateral and posterior thigh.  Patient describes the pain is kind of banding across to her back and radiating into her leg.  She cannot point to a specific area where the pain emanates from.  No new weakness or numbness.  Patient is also completed a course of physical therapy      Previous Treatment:    The following portions of the patient's history were reviewed and updated as appropriate: allergies, current medications, past family history, past medical history, past social history, past surgical history and problem list.    Review of Systems   Constitutional: Positive for activity change.   Respiratory: Negative for chest tightness and shortness of breath.    Cardiovascular: Negative for chest pain.   Musculoskeletal: Positive for back pain and myalgias.       Objective     /77   Pulse 74   Temp 97.5 °F (36.4 °C)   Ht 160 cm (62.99\")   Wt 83.7 kg (184 lb 9.6 oz)   LMP 03/03/2020   SpO2 100%   BMI 32.71 kg/m²    Body mass index is 32.71 kg/m².      Neurologic Exam    Assessment/Plan   Independent Review of Radiographic Studies:      I personally reviewed and interpreted the images from the following studies.    No new imaging    Medical Decision Making:      Eleanor Bonds is a 45 y.o. female with a history of L1-2 disc herniation with symptoms that seem to have improved following injection and physical therapy.  Currently her symptoms are more consistent with degenerative issues she has had in her lower lumbar spine.  She has autofusion at L4 5 and degenerative changes at the levels above and below with any degree of stenosis on her previous MRI.  She is following up with pain management today.  I " would recommend that they try a interlaminar epidural steroid injection to see if she gets improvement.  If there is no improvement with this, she will need a new MRI given that her symptoms have changed over the course of time and we can reevaluate.  I will see her back when she completes the injection.    There are no diagnoses linked to this encounter.  No follow-ups on file.    This patient was examined wearing appropriate personal protective equipment.                      Dr. Javi Campos IV    11/17/21  09:54 EST

## 2021-11-17 ENCOUNTER — OFFICE VISIT (OUTPATIENT)
Dept: PAIN MEDICINE | Facility: CLINIC | Age: 46
End: 2021-11-17

## 2021-11-17 ENCOUNTER — OFFICE VISIT (OUTPATIENT)
Dept: NEUROSURGERY | Facility: CLINIC | Age: 46
End: 2021-11-17

## 2021-11-17 VITALS
WEIGHT: 184 LBS | OXYGEN SATURATION: 99 % | DIASTOLIC BLOOD PRESSURE: 76 MMHG | RESPIRATION RATE: 16 BRPM | BODY MASS INDEX: 33.86 KG/M2 | HEIGHT: 62 IN | SYSTOLIC BLOOD PRESSURE: 136 MMHG | HEART RATE: 75 BPM

## 2021-11-17 VITALS
HEART RATE: 74 BPM | TEMPERATURE: 97.5 F | DIASTOLIC BLOOD PRESSURE: 77 MMHG | WEIGHT: 184.6 LBS | OXYGEN SATURATION: 100 % | HEIGHT: 63 IN | SYSTOLIC BLOOD PRESSURE: 119 MMHG | BODY MASS INDEX: 32.71 KG/M2

## 2021-11-17 DIAGNOSIS — M54.16 LUMBAR RADICULOPATHY: Primary | ICD-10-CM

## 2021-11-17 DIAGNOSIS — M48.061 SPINAL STENOSIS, LUMBAR REGION, WITHOUT NEUROGENIC CLAUDICATION: ICD-10-CM

## 2021-11-17 DIAGNOSIS — M47.816 LUMBAR SPONDYLOSIS: ICD-10-CM

## 2021-11-17 DIAGNOSIS — M51.36 DDD (DEGENERATIVE DISC DISEASE), LUMBAR: Primary | ICD-10-CM

## 2021-11-17 DIAGNOSIS — M51.26 LUMBAR DISC HERNIATION: ICD-10-CM

## 2021-11-17 DIAGNOSIS — M53.3 SACROILIAC JOINT DYSFUNCTION: ICD-10-CM

## 2021-11-17 PROCEDURE — 99214 OFFICE O/P EST MOD 30 MIN: CPT | Performed by: NEUROLOGICAL SURGERY

## 2021-11-17 PROCEDURE — 99214 OFFICE O/P EST MOD 30 MIN: CPT | Performed by: STUDENT IN AN ORGANIZED HEALTH CARE EDUCATION/TRAINING PROGRAM

## 2021-11-17 RX ORDER — IBUPROFEN 200 MG
200 TABLET ORAL EVERY 6 HOURS PRN
COMMUNITY
End: 2022-07-13 | Stop reason: HOSPADM

## 2021-12-16 ENCOUNTER — HOSPITAL ENCOUNTER (OUTPATIENT)
Dept: PAIN MEDICINE | Facility: HOSPITAL | Age: 46
Discharge: HOME | End: 2021-12-16

## 2021-12-16 VITALS
WEIGHT: 184 LBS | SYSTOLIC BLOOD PRESSURE: 132 MMHG | RESPIRATION RATE: 16 BRPM | OXYGEN SATURATION: 98 % | HEIGHT: 62 IN | TEMPERATURE: 97.1 F | HEART RATE: 82 BPM | BODY MASS INDEX: 33.86 KG/M2 | DIASTOLIC BLOOD PRESSURE: 92 MMHG

## 2021-12-16 DIAGNOSIS — R52 PAIN: ICD-10-CM

## 2021-12-16 DIAGNOSIS — M51.36 DDD (DEGENERATIVE DISC DISEASE), LUMBAR: ICD-10-CM

## 2021-12-16 PROCEDURE — 77003 FLUOROGUIDE FOR SPINE INJECT: CPT

## 2021-12-16 PROCEDURE — 25010000002 METHYLPREDNISOLONE PER 80 MG: Performed by: STUDENT IN AN ORGANIZED HEALTH CARE EDUCATION/TRAINING PROGRAM

## 2021-12-16 PROCEDURE — 0 IOPAMIDOL 41 % SOLUTION: Performed by: STUDENT IN AN ORGANIZED HEALTH CARE EDUCATION/TRAINING PROGRAM

## 2021-12-16 PROCEDURE — 62323 NJX INTERLAMINAR LMBR/SAC: CPT | Performed by: STUDENT IN AN ORGANIZED HEALTH CARE EDUCATION/TRAINING PROGRAM

## 2021-12-16 RX ORDER — OMEPRAZOLE 20 MG/1
20 CAPSULE, DELAYED RELEASE ORAL DAILY PRN
COMMUNITY

## 2021-12-16 RX ORDER — METHYLPREDNISOLONE ACETATE 80 MG/ML
80 INJECTION, SUSPENSION INTRA-ARTICULAR; INTRALESIONAL; INTRAMUSCULAR; SOFT TISSUE ONCE
Status: COMPLETED | OUTPATIENT
Start: 2021-12-16 | End: 2021-12-16

## 2021-12-16 RX ADMIN — METHYLPREDNISOLONE ACETATE 80 MG: 80 INJECTION, SUSPENSION INTRA-ARTICULAR; INTRALESIONAL; INTRAMUSCULAR; SOFT TISSUE at 11:03

## 2021-12-16 RX ADMIN — IOPAMIDOL 3 ML: 408 INJECTION, SOLUTION INTRATHECAL at 11:03

## 2021-12-16 NOTE — PROCEDURES
PREOPERATIVE DIAGNOSIS:    1. Lumbar DDD    POSTOPERATIVE DIAGNOSIS: Same    PROCEDURE:  Lumbar epidural steroid injection L 1-2    PROCEDURE NOTE:  After obtaining written informed consent patient was taken to the procedure room. Pre-procedure blood pressure and pulse were stable and recorded in patients clinic chart.     The patient was placed in the prone position. The lower back was prepped with antiseptic solution and draped in the usual sterile fashion.  The skin over the L 1-2 space was identified under fluoroscopic guidance and infiltrated with 1% lidocaine for local anesthesia via 25 gauge needle.  A 20-gauge tuohy needle was used to access the epidural space using loss of resistance to air technique. Following negative aspiration, 2 cc of the omnipaque dye was injected.  There was good spread of the dye from L1-L4 area. A mixture containing  3 ml of saline with 80 mg of depo-medrol was injected. There was no evidence of CSF, paresthesia or vascular spread. The needle was removed. Skin was cleaned and band aid was applied.    Following the procedure the patient's vital signs were stable. The patient was discharged home in good condition after being given discharge instructions.    COMPLICATIONS: None     Melvin Cerna DO  Pain Management   [facility]

## 2021-12-16 NOTE — DISCHARGE INSTRUCTIONS
EPIDURAL STEROID INJECTION          An epidural steroid injection is a shot of steroid medicine and numbing medicine that is given into the space between the spinal cord and the bones of the back (epidural space).  The injection helps relieve pain by an irritated or swollen nerve root.    TELL YOUR HEALTH CARE PROVIDER ABOUT:  • Any allergies you have  • All medicines you are taking including any over the counter medicines  • Any blood disorders you have  • Any surgeries you have had  • Any medical conditions you have  • Whether you are pregnant or may be pregnant    WHAT ARE THE RISK?  Generally, this is a safe procedure. However,problems may occur, including  • Headache  • Bleeding  • Infection  • Allergic Reaction  • Nerve Damage    WHAT CAN I EXPECT AFTER THE PROCEDURE?    INJECTION SITE  • Remove the Band-Aid/s after 24 hours  • Check your injection site every day for signs of infection.  Check for:             Redness             Bleeding (small amt is normal)             Warmth             Pus or bad odor  • Some numbness may be experienced for several hours following the procedure.  • Avoid using heat on the injection site for 24 hours. You may use ice intermittently if needed by placing a         towel between your skin and the ice bag and using the ice for 20 minutes 2-3 times a day.  • Do not take baths, swim or use a hot tub for 24 hours.    ACTIVITY  • No strenuous activity for 24 hours then return to normal activity as tolerated.  • If your leg is numb, no driving until full sensation and strength has returned.    GENERAL INSTRUCTIONS:  • The injection site may feel numb, use ice with caution if numbness is present and no heat for 24 hours or until numbness is gone.   • If you have numbness or weakness in your arm or leg, use those areas with caution until normal sensation returns.  • It is not uncommon to notice an increase in discomfort or a change in the location of discomfort for 3-4 days after  the procedure.  If discomfort is noticed at the injection site, ice may be            applied to that area for 20 min 2-3 times a day.  • Take the pain medicine your physician has prescribed or over the counter pain relievers as long as you do not have any contraindications.  • If you are a diabetic, monitor your blood sugar closely.  The steroids used in your procedure may increase your blood sugar level up to 36 hours after the injection.  If your blood sugar is greater than 250, call the physician that helps you monitor your blood sugar.  • Keep all follow-up visits as scheduled by your health care provider. This is important.    CONTACT OUR OFFICE IF:  • You have any of these signs of infection            -Redness, swelling, or warmth around your injection site.            -Fluid or blood coming from your injection site (small amt of blood is normal)            -Pus or a bad odor from your injection site            -A fever  • You develop a severe headache or a stiff neck  • You lose control of your bladder or bowel movements      PAIN MANAGEMENT CENTER HOURS   • Monday-Friday 7:30 am. - 4:00 pm.  For any problem related to your procedure we can be reached at [phone]  • If you experience an emergency with your procedure, call [phone] or go to the emergency room.                             \

## 2021-12-16 NOTE — H&P
H and P reviewed from previous visit and no changes to patient's clinical presentation. Will proceed with procedure as planned. Patient denies history of DM and being on blood thinners.    Melvin Cerna DO  Pain Management   [facility]

## 2021-12-28 NOTE — PROGRESS NOTES
Subjective   Eleanor Bonds is a 46 y.o. female is here for follow-up for lower back pain.  She was last seen on 12/16/2021 for LESI L1-2 with 80% pain relief. Her pain is much improved with improved numbness as well. She has some mild lowe back pain now.     On last visit:     Lower back pain is 2/10 on VAS, and maximum 2/10.  Pain is aching, dull, numbness in nature.  Pain is NOT referred anymore to left lower back, left hip, buttock. Improved by TFESI and LESI.     Previous Injection:   12/16/2021-LESI 1-2- 80% pain relief.   10/28/2021 - L SI joint injection - no relief.   9/30/2021 - Left L1, L2 TFESI - 60% pain relief.     Hx: Referred by Dr. Campos, Javi ALVAREZ MD left L1-L2 TFESI. Denies any significant injuries.  She has done physical therapy without any significant relief.     PHQ-9-   SOAPP- 10     PMH:   L4 laminectomy, kidney stones, history of hydronephrosis, cervical cancer, DVT and cardiomyopathy during pregnancy.     Current Medications:   Flexeril 10 mg 3 times daily as needed        Past Medications:     Past Modalities:  TENS:                                                                          no                                                  Physical Therapy Within The Last 6 Months              Yes (since August, with some help).   Psychotherapy                                                            no  Massage Therapy                                                       no     Patient Complains Of:  Uro-Fecal Incontinence          no  Weight Gain/Loss                   no  Fever/Chills                             no  Weakness                               Yes (chronic weakness since surgery)             Current Outpatient Medications:   •  ibuprofen (ADVIL,MOTRIN) 200 MG tablet, Take 200 mg by mouth Every 6 (Six) Hours As Needed for Mild Pain ., Disp: , Rfl:   •  omeprazole (priLOSEC) 20 MG capsule, Take 20 mg by mouth Daily As Needed., Disp: , Rfl:     The following portions of  "the patient's history were reviewed and updated as appropriate: allergies, current medications, past family history, past medical history, past social history, past surgical history, and problem list.      REVIEW OF PERTINENT MEDICAL DATA    Past Medical History:   Diagnosis Date   • Cervical cancer (HCC)    • GERD (gastroesophageal reflux disease)    • Low back pain      Past Surgical History:   Procedure Laterality Date   • BACK SURGERY     • CYSTOSCOPY W/ URETERAL STENT PLACEMENT Right 8/8/2021    Procedure: CYSTOSCOPY, RIGHT URETEROSCOPY, STONE EXTRACTION, RETROGRADE AND STENT PLACEMENT RIGHT URETER (NO STRING);  Surgeon: Jose Maria Mcelroy MD;  Location: [facility] OR;  Service: Urology;  Laterality: Right;   • HYSTERECTOMY  12/2020     Family History   Problem Relation Age of Onset   • Diabetes Mother    • Hyperlipidemia Mother    • Hypertension Mother    • Arthritis Mother    • Heart disease Father    • Hyperlipidemia Father    • Hypertension Father    • COPD Father    • Emphysema Father      Social History     Socioeconomic History   • Marital status:    Tobacco Use   • Smoking status: Former Smoker   • Smokeless tobacco: Never Used   Vaping Use   • Vaping Use: Never used   Substance and Sexual Activity   • Alcohol use: Yes     Comment: occ   • Drug use: No     Types: Marijuana   • Sexual activity: Yes         Review of Systems   Musculoskeletal: Positive for back pain.         Vitals:    12/29/21 1126   BP: 154/85   Pulse: 80   Resp: 16   SpO2: 99%   Weight: 83.5 kg (184 lb)   Height: 157.5 cm (62\")   PainSc:   2         Objective   Physical Exam  Musculoskeletal:         General: Tenderness present.        Legs:    Neurological:      Deep Tendon Reflexes:      Reflex Scores:       Patellar reflexes are 2+ on the right side and 2+ on the left side.       Achilles reflexes are 2+ on the right side and 2+ on the left side.     Comments: Motor strength 5/5 b/l LE  Sensory intact b/l LE except mild " sensory loss in left L1, L2.              Imaging Reviewed:  MRI lumbar 7/23/2021:   Partial ankylosis noted at the L4-5 level at the left aspect of the disc space.       L1-L2:  mild bulging of the disc. Mild facet arthropathy. Negative for canal stenosis. No foraminal stenosis.     L2-L3: Mild disc bulge asymmetric to the left foraminal to left lateral region.  Mild facet arthropathy bilateral.  Mild narrowing of left lateral recess without high-grade canal stenosis.  Moderate left foraminal stenosis.     L3-L4-mild broad-based disc bulge with superimposed central protrusion which mildly narrows the left and right lateral recess.  Bilateral facet arthropathy and ligamentum flavum thickening.  This contributes to moderate central canal stenosis with AP canal diameter narrowed to 5 mm.  There is asymmetric left foraminal/lateral disc osteophyte contributing to mild left foraminal stenosis.     L4-L5-mild bilateral facet arthropathy and ligamentum flavum thickening.  Prior left hemilaminectomy.  Mild foraminal stenosis.  L4-S1-mild bulging of the disc.  Moderate left facet arthropathy.  Mild ligamentum flavum thickening left greater than right, mild left foraminal stenosis.      Assessment:    1. DDD (degenerative disc disease), lumbar    2. Lumbar spondylosis         1. Defer UDS for now.    2. Excellent relief from LESI L1-2. Will repeat in future if needed.        RTC as needed.       Melvin Cerna DO  Pain Management   [facility]            INSPECT REPORT    As part of the patient's treatment plan, I may be prescribing controlled substances. The patient has been made aware of appropriate use of such medications, including potential risk of somnolence, limited ability to drive and/or work safely, and the potential for dependence or overdose. It has also been made clear that these medications are for use by this patient only, without concomitant use of alcohol or other substances unless prescribed.     Patient  has completed prescribing agreement detailing terms of continued prescribing of controlled substances, including monitoring INSPECT reports, urine drug screening, and pill counts if necessary. The patient is aware that inappropriate use will results in cessation of prescribing such medications.    INSPECT report has been reviewed and scanned into the patient's chart.

## 2021-12-29 ENCOUNTER — OFFICE VISIT (OUTPATIENT)
Dept: PAIN MEDICINE | Facility: CLINIC | Age: 46
End: 2021-12-29

## 2021-12-29 VITALS
WEIGHT: 184 LBS | OXYGEN SATURATION: 99 % | RESPIRATION RATE: 16 BRPM | HEIGHT: 62 IN | SYSTOLIC BLOOD PRESSURE: 154 MMHG | BODY MASS INDEX: 33.86 KG/M2 | DIASTOLIC BLOOD PRESSURE: 85 MMHG | HEART RATE: 80 BPM

## 2021-12-29 DIAGNOSIS — M47.816 LUMBAR SPONDYLOSIS: ICD-10-CM

## 2021-12-29 DIAGNOSIS — M51.36 DDD (DEGENERATIVE DISC DISEASE), LUMBAR: Primary | ICD-10-CM

## 2021-12-29 PROCEDURE — 99213 OFFICE O/P EST LOW 20 MIN: CPT | Performed by: STUDENT IN AN ORGANIZED HEALTH CARE EDUCATION/TRAINING PROGRAM

## 2022-02-22 ENCOUNTER — OFFICE VISIT (OUTPATIENT)
Dept: FAMILY MEDICINE CLINIC | Facility: CLINIC | Age: 47
End: 2022-02-22

## 2022-02-22 VITALS
WEIGHT: 181 LBS | DIASTOLIC BLOOD PRESSURE: 88 MMHG | OXYGEN SATURATION: 100 % | BODY MASS INDEX: 33.31 KG/M2 | SYSTOLIC BLOOD PRESSURE: 144 MMHG | HEIGHT: 62 IN | HEART RATE: 76 BPM

## 2022-02-22 DIAGNOSIS — H65.02 ACUTE SEROUS OTITIS MEDIA OF LEFT EAR, RECURRENCE NOT SPECIFIED: Primary | ICD-10-CM

## 2022-02-22 PROCEDURE — 99213 OFFICE O/P EST LOW 20 MIN: CPT | Performed by: NURSE PRACTITIONER

## 2022-02-22 RX ORDER — AMOXICILLIN 875 MG/1
875 TABLET, COATED ORAL 2 TIMES DAILY
Qty: 20 TABLET | Refills: 0 | Status: SHIPPED | OUTPATIENT
Start: 2022-02-22 | End: 2022-03-04

## 2022-02-22 NOTE — PROGRESS NOTES
Subjective   Eleanor Bonds is a 46 y.o. female.       HPI   Pt here today with concern of left ear pain.    Started a few weeks ago.    Wakes with head congestion.   Denies any sore throat.    Denies any cough or wheezes.   No fevers.      The following portions of the patient's history were reviewed and updated as appropriate: allergies, current medications, past family history, past medical history, past social history, past surgical history and problem list.    Review of Systems   Constitutional: Negative for activity change, appetite change, chills, diaphoresis, fatigue and fever.   HENT: Positive for congestion and ear pain. Negative for ear discharge, postnasal drip, rhinorrhea, sinus pressure, sneezing, sore throat, swollen glands and trouble swallowing.    Eyes: Negative for pain, discharge, redness and itching.   Respiratory: Negative for cough, chest tightness, shortness of breath and wheezing.    Cardiovascular: Negative for chest pain and palpitations.   Gastrointestinal: Negative for diarrhea, nausea and vomiting.   Neurological: Negative for dizziness, weakness and headache.   Psychiatric/Behavioral: Negative for depressed mood. The patient is not nervous/anxious.        Objective   Physical Exam  Vitals reviewed.   Constitutional:       General: She is not in acute distress.     Appearance: Normal appearance.   HENT:      Head: Normocephalic and atraumatic.      Right Ear: Tympanic membrane, ear canal and external ear normal.      Left Ear: Ear canal and external ear normal. Tympanic membrane is erythematous.      Nose: Nose normal.      Mouth/Throat:      Mouth: Mucous membranes are moist.      Pharynx: Oropharynx is clear.   Eyes:      General:         Right eye: No discharge.         Left eye: No discharge.      Conjunctiva/sclera: Conjunctivae normal.   Cardiovascular:      Rate and Rhythm: Normal rate and regular rhythm.      Pulses: Normal pulses.      Heart sounds: Normal heart sounds. No  murmur heard.      Pulmonary:      Effort: Pulmonary effort is normal. No respiratory distress.      Breath sounds: Normal breath sounds. No wheezing or rhonchi.   Chest:      Chest wall: No tenderness.   Abdominal:      Tenderness: There is no right CVA tenderness or left CVA tenderness.   Musculoskeletal:      Cervical back: Normal range of motion and neck supple. No tenderness.   Lymphadenopathy:      Cervical: No cervical adenopathy.   Skin:     General: Skin is warm and dry.      Findings: No erythema.   Neurological:      General: No focal deficit present.      Mental Status: She is alert and oriented to person, place, and time.   Psychiatric:         Mood and Affect: Mood normal.           Assessment/Plan   Diagnoses and all orders for this visit:    1. Acute serous otitis media of left ear, recurrence not specified (Primary)  Comments:  Given amoxcillin.   Start otc cetrizine or loratadine.   Increase fluids and rest.    Call if not improving.    Orders:  -     amoxicillin (AMOXIL) 875 MG tablet; Take 1 tablet by mouth 2 (Two) Times a Day for 10 days.  Dispense: 20 tablet; Refill: 0

## 2022-05-27 NOTE — PROGRESS NOTES
Physical Therapy Daily Progress Note     Diagnosis Plan   1. Acute left-sided low back pain with left-sided sciatica         VISIT#: 2    Subjective   Eleanor Bonds reports: had MRI and assessed by spine MD: continue PT and begin epidurals once set up.    Objective     See Exercise, Manual, and Modality Logs for complete treatment.     Patient Education:  Access Code: [email or web address]  URL: [email or web address]  Date: 08/19/2021  Prepared by: Abdon Mauricio    Exercises  Supine Double Knee to Chest - 1 x daily - 7 x weekly - 1 sets - 10 reps - 5 sec hold  Hooklying Single Knee to Chest - 1 x daily - 7 x weekly - 1 sets - 10 reps - 5 sec hold  Supine Posterior Pelvic Tilt - 1 x daily - 7 x weekly - 2 sets - 10 reps - 3-5 sec hold    Assessment/Plan - pain lessened with PPT and decompression position; advised to lay with LE's on couch x 10 min daily to decompress spine.      Progress per Plan of Care            Timed:         Manual Therapy:    10     mins  04240;     Therapeutic Exercise:    25     mins  21138;     Neuromuscular Nell:        mins  51895;    Therapeutic Activity:         mins  29231;     Gait Training:           mins  79008;     Ultrasound:          mins  20147;    Ionto                                  mins   79081  Self Care                            mins   36310  Canalith Repos                   mins  4209  Aquatic                               mins 55268    Un-Timed:  Electrical Stimulation:    15     mins  83726 ( );  Dry Needling         mins self-pay  Traction          mins 02445  Low Eval          Mins  42214  Mod Eval          Mins  42239  High Eval                           Mins  70038  Re-Eval                               mins  48976    Timed Treatment:   35   mins   Total Treatment:     50  mins    Kalyan Mauricio PT PT, DPT, [id number]       
finger

## 2022-06-15 ENCOUNTER — OFFICE VISIT (OUTPATIENT)
Dept: FAMILY MEDICINE CLINIC | Facility: CLINIC | Age: 47
End: 2022-06-15

## 2022-06-15 VITALS
DIASTOLIC BLOOD PRESSURE: 97 MMHG | BODY MASS INDEX: 30.18 KG/M2 | HEART RATE: 78 BPM | OXYGEN SATURATION: 100 % | TEMPERATURE: 97.8 F | WEIGHT: 165 LBS | SYSTOLIC BLOOD PRESSURE: 149 MMHG

## 2022-06-15 DIAGNOSIS — J01.90 ACUTE NON-RECURRENT SINUSITIS, UNSPECIFIED LOCATION: Primary | ICD-10-CM

## 2022-06-15 PROCEDURE — 99213 OFFICE O/P EST LOW 20 MIN: CPT | Performed by: NURSE PRACTITIONER

## 2022-06-15 RX ORDER — AMOXICILLIN AND CLAVULANATE POTASSIUM 875; 125 MG/1; MG/1
1 TABLET, FILM COATED ORAL 2 TIMES DAILY
Qty: 20 TABLET | Refills: 0 | Status: SHIPPED | OUTPATIENT
Start: 2022-06-15 | End: 2022-06-25

## 2022-06-15 NOTE — PATIENT INSTRUCTIONS
Push water intake  Complete antibiotic  Cont mucinex  Take tylenol or ibuprofen for fever or discomfort  Get plenty of rest  Call if no improvement or worsening symptoms

## 2022-06-15 NOTE — PROGRESS NOTES
Subjective     Eleanor Bonds is a 46 y.o. female.     Pt is here today with c/o congestion and drainage.  She reports her symptoms started on Friday.  Denies any fever or chills.  She has been using mucinex and johanne-selzer plus cold and flonase.  She states that everything is starting to break up some but the drainage has increased.  Denies sore throat.  Has some mild chest discomfort from the drainage and cough.  States that her face hurts.       The following portions of the patient's history were reviewed and updated as appropriate: allergies, current medications, past family history, past medical history, past social history, past surgical history and problem list.    Review of Systems   Constitutional: Negative for chills, fatigue and fever.   HENT: Positive for congestion, ear pain, postnasal drip and sinus pressure. Negative for sore throat.    Respiratory: Positive for cough. Negative for chest tightness and shortness of breath.    Cardiovascular: Positive for chest pain. Negative for palpitations.   Musculoskeletal: Negative for myalgias.   Neurological: Positive for headache. Negative for dizziness.       Objective     /97 (BP Location: Left arm, Patient Position: Sitting, Cuff Size: Adult)   Pulse 78   Temp 97.8 °F (36.6 °C) (Tympanic)   Wt 74.8 kg (165 lb)   LMP 03/03/2020   SpO2 100%   BMI 30.18 kg/m²     Current Outpatient Medications on File Prior to Visit   Medication Sig Dispense Refill   • ibuprofen (ADVIL,MOTRIN) 200 MG tablet Take 200 mg by mouth Every 6 (Six) Hours As Needed for Mild Pain .     • omeprazole (priLOSEC) 20 MG capsule Take 20 mg by mouth Daily As Needed.       No current facility-administered medications on file prior to visit.        Physical Exam  Vitals reviewed.   Constitutional:       General: She is not in acute distress.     Appearance: Normal appearance. She is well-developed. She is not diaphoretic.   HENT:      Head: Normocephalic and atraumatic.       Right Ear: Tympanic membrane and ear canal normal.      Left Ear: Tympanic membrane and ear canal normal.      Nose: Congestion present.      Mouth/Throat:      Pharynx: No oropharyngeal exudate or posterior oropharyngeal erythema.   Eyes:      General:         Right eye: No discharge.         Left eye: No discharge.      Extraocular Movements: Extraocular movements intact.      Conjunctiva/sclera: Conjunctivae normal.   Cardiovascular:      Rate and Rhythm: Normal rate and regular rhythm.      Heart sounds: No murmur heard.  Pulmonary:      Effort: Pulmonary effort is normal. No respiratory distress.      Breath sounds: Normal breath sounds. No wheezing or rales.   Abdominal:      General: Bowel sounds are normal.      Palpations: Abdomen is soft.   Musculoskeletal:         General: Normal range of motion.      Cervical back: Normal range of motion.   Skin:     General: Skin is warm and dry.   Neurological:      General: No focal deficit present.      Mental Status: She is alert and oriented to person, place, and time.   Psychiatric:         Mood and Affect: Mood normal.         Behavior: Behavior normal.         Thought Content: Thought content normal.         Judgment: Judgment normal.           Assessment & Plan     Diagnoses and all orders for this visit:    1. Acute non-recurrent sinusitis, unspecified location (Primary)  Comments:  start augmentin  cont mucinex  flonase gave bloody nose  push fluids  call if no imp  Orders:  -     amoxicillin-clavulanate (Augmentin) 875-125 MG per tablet; Take 1 tablet by mouth 2 (Two) Times a Day for 10 days.  Dispense: 20 tablet; Refill: 0        Monitor BP at home- possibly elevated from meds/illness

## 2022-07-11 ENCOUNTER — ANESTHESIA EVENT (OUTPATIENT)
Dept: PERIOP | Facility: HOSPITAL | Age: 47
End: 2022-07-11

## 2022-07-11 ENCOUNTER — APPOINTMENT (OUTPATIENT)
Dept: CT IMAGING | Facility: HOSPITAL | Age: 47
End: 2022-07-11

## 2022-07-11 ENCOUNTER — APPOINTMENT (OUTPATIENT)
Dept: GENERAL RADIOLOGY | Facility: HOSPITAL | Age: 47
End: 2022-07-11

## 2022-07-11 ENCOUNTER — ANESTHESIA (OUTPATIENT)
Dept: PERIOP | Facility: HOSPITAL | Age: 47
End: 2022-07-11

## 2022-07-11 ENCOUNTER — APPOINTMENT (OUTPATIENT)
Dept: ULTRASOUND IMAGING | Facility: HOSPITAL | Age: 47
End: 2022-07-11

## 2022-07-11 ENCOUNTER — HOSPITAL ENCOUNTER (OUTPATIENT)
Facility: HOSPITAL | Age: 47
Discharge: HOME | End: 2022-07-13
Attending: EMERGENCY MEDICINE | Admitting: STUDENT IN AN ORGANIZED HEALTH CARE EDUCATION/TRAINING PROGRAM

## 2022-07-11 DIAGNOSIS — R07.9 CHEST PAIN, UNSPECIFIED TYPE: ICD-10-CM

## 2022-07-11 DIAGNOSIS — K81.9 CHOLECYSTITIS: Primary | ICD-10-CM

## 2022-07-11 DIAGNOSIS — R11.2 NAUSEA AND VOMITING, UNSPECIFIED VOMITING TYPE: ICD-10-CM

## 2022-07-11 DIAGNOSIS — R10.10 UPPER ABDOMINAL PAIN: ICD-10-CM

## 2022-07-11 LAB
ALBUMIN SERPL-MCNC: 4.4 G/DL (ref 3.5–5.2)
ALBUMIN/GLOB SERPL: 1.8 G/DL
ALP SERPL-CCNC: 79 U/L (ref 39–117)
ALT SERPL W P-5'-P-CCNC: 12 U/L (ref 1–33)
ANION GAP SERPL CALCULATED.3IONS-SCNC: 12 MMOL/L (ref 5–15)
APTT PPP: 29.7 SECONDS (ref 24–31)
AST SERPL-CCNC: 13 U/L (ref 1–32)
BASOPHILS # BLD AUTO: 0.1 10*3/MM3 (ref 0–0.2)
BASOPHILS # BLD AUTO: 0.1 10*3/MM3 (ref 0–0.2)
BASOPHILS NFR BLD AUTO: 0.8 % (ref 0–1.5)
BASOPHILS NFR BLD AUTO: 0.9 % (ref 0–1.5)
BILIRUB SERPL-MCNC: 0.3 MG/DL (ref 0–1.2)
BILIRUB UR QL STRIP: NEGATIVE
BUN SERPL-MCNC: 11 MG/DL (ref 6–20)
BUN/CREAT SERPL: 15.5 (ref 7–25)
CALCIUM SPEC-SCNC: 9.1 MG/DL (ref 8.6–10.5)
CHLORIDE SERPL-SCNC: 101 MMOL/L (ref 98–107)
CHOLEST SERPL-MCNC: 187 MG/DL (ref 0–200)
CLARITY UR: CLEAR
CO2 SERPL-SCNC: 27 MMOL/L (ref 22–29)
COLOR UR: YELLOW
CREAT SERPL-MCNC: 0.71 MG/DL (ref 0.57–1)
D DIMER PPP FEU-MCNC: 0.34 MG/L (FEU) (ref 0–0.59)
DEPRECATED RDW RBC AUTO: 42 FL (ref 37–54)
DEPRECATED RDW RBC AUTO: 42 FL (ref 37–54)
EGFRCR SERPLBLD CKD-EPI 2021: 106.3 ML/MIN/1.73
EOSINOPHIL # BLD AUTO: 0 10*3/MM3 (ref 0–0.4)
EOSINOPHIL # BLD AUTO: 0.4 10*3/MM3 (ref 0–0.4)
EOSINOPHIL NFR BLD AUTO: 0.1 % (ref 0.3–6.2)
EOSINOPHIL NFR BLD AUTO: 2.7 % (ref 0.3–6.2)
ERYTHROCYTE [DISTWIDTH] IN BLOOD BY AUTOMATED COUNT: 14.2 % (ref 12.3–15.4)
ERYTHROCYTE [DISTWIDTH] IN BLOOD BY AUTOMATED COUNT: 14.2 % (ref 12.3–15.4)
GLOBULIN UR ELPH-MCNC: 2.5 GM/DL
GLUCOSE SERPL-MCNC: 125 MG/DL (ref 65–99)
GLUCOSE UR STRIP-MCNC: NEGATIVE MG/DL
HCG SERPL QL: NEGATIVE
HCT VFR BLD AUTO: 39.4 % (ref 34–46.6)
HCT VFR BLD AUTO: 41.7 % (ref 34–46.6)
HDLC SERPL-MCNC: 46 MG/DL (ref 40–60)
HGB BLD-MCNC: 12.9 G/DL (ref 12–15.9)
HGB BLD-MCNC: 13.8 G/DL (ref 12–15.9)
HGB UR QL STRIP.AUTO: NEGATIVE
HOLD SPECIMEN: NORMAL
HOLD SPECIMEN: NORMAL
INR PPP: 0.97 (ref 0.93–1.1)
KETONES UR QL STRIP: NEGATIVE
LDLC SERPL CALC-MCNC: 113 MG/DL (ref 0–100)
LDLC/HDLC SERPL: 2.38 {RATIO}
LEUKOCYTE ESTERASE UR QL STRIP.AUTO: NEGATIVE
LIPASE SERPL-CCNC: 52 U/L (ref 13–60)
LYMPHOCYTES # BLD AUTO: 2 10*3/MM3 (ref 0.7–3.1)
LYMPHOCYTES # BLD AUTO: 3.4 10*3/MM3 (ref 0.7–3.1)
LYMPHOCYTES NFR BLD AUTO: 11.2 % (ref 19.6–45.3)
LYMPHOCYTES NFR BLD AUTO: 21.1 % (ref 19.6–45.3)
MCH RBC QN AUTO: 27.9 PG (ref 26.6–33)
MCH RBC QN AUTO: 28 PG (ref 26.6–33)
MCHC RBC AUTO-ENTMCNC: 32.8 G/DL (ref 31.5–35.7)
MCHC RBC AUTO-ENTMCNC: 33.1 G/DL (ref 31.5–35.7)
MCV RBC AUTO: 84.4 FL (ref 79–97)
MCV RBC AUTO: 84.9 FL (ref 79–97)
MONOCYTES # BLD AUTO: 0.5 10*3/MM3 (ref 0.1–0.9)
MONOCYTES # BLD AUTO: 0.9 10*3/MM3 (ref 0.1–0.9)
MONOCYTES NFR BLD AUTO: 2.6 % (ref 5–12)
MONOCYTES NFR BLD AUTO: 5.8 % (ref 5–12)
NEUTROPHILS NFR BLD AUTO: 11.1 10*3/MM3 (ref 1.7–7)
NEUTROPHILS NFR BLD AUTO: 15.1 10*3/MM3 (ref 1.7–7)
NEUTROPHILS NFR BLD AUTO: 69.5 % (ref 42.7–76)
NEUTROPHILS NFR BLD AUTO: 85.3 % (ref 42.7–76)
NITRITE UR QL STRIP: NEGATIVE
NRBC BLD AUTO-RTO: 0 /100 WBC (ref 0–0.2)
NRBC BLD AUTO-RTO: 0 /100 WBC (ref 0–0.2)
NT-PROBNP SERPL-MCNC: 154.8 PG/ML (ref 0–450)
PH UR STRIP.AUTO: 8.5 [PH] (ref 5–8)
PLATELET # BLD AUTO: 329 10*3/MM3 (ref 140–450)
PLATELET # BLD AUTO: 352 10*3/MM3 (ref 140–450)
PMV BLD AUTO: 8.8 FL (ref 6–12)
PMV BLD AUTO: 9.3 FL (ref 6–12)
POTASSIUM SERPL-SCNC: 3.8 MMOL/L (ref 3.5–5.2)
PROT SERPL-MCNC: 6.9 G/DL (ref 6–8.5)
PROT UR QL STRIP: NEGATIVE
PROTHROMBIN TIME: 10 SECONDS (ref 9.6–11.7)
RBC # BLD AUTO: 4.63 10*6/MM3 (ref 3.77–5.28)
RBC # BLD AUTO: 4.94 10*6/MM3 (ref 3.77–5.28)
SARS-COV-2 RNA PNL SPEC NAA+PROBE: NOT DETECTED
SODIUM SERPL-SCNC: 140 MMOL/L (ref 136–145)
SP GR UR STRIP: 1.06 (ref 1–1.03)
TRIGL SERPL-MCNC: 157 MG/DL (ref 0–150)
TROPONIN T SERPL-MCNC: <0.01 NG/ML (ref 0–0.03)
UROBILINOGEN UR QL STRIP: ABNORMAL
VLDLC SERPL-MCNC: 28 MG/DL (ref 5–40)
WBC NRBC COR # BLD: 15.9 10*3/MM3 (ref 3.4–10.8)
WBC NRBC COR # BLD: 17.8 10*3/MM3 (ref 3.4–10.8)
WHOLE BLOOD HOLD COAG: NORMAL
WHOLE BLOOD HOLD SPECIMEN: NORMAL

## 2022-07-11 PROCEDURE — 25010000002 PIPERACILLIN SOD-TAZOBACTAM PER 1 G: Performed by: INTERNAL MEDICINE

## 2022-07-11 PROCEDURE — 93005 ELECTROCARDIOGRAM TRACING: CPT

## 2022-07-11 PROCEDURE — 88304 TISSUE EXAM BY PATHOLOGIST: CPT | Performed by: STUDENT IN AN ORGANIZED HEALTH CARE EDUCATION/TRAINING PROGRAM

## 2022-07-11 PROCEDURE — S2900 ROBOTIC SURGICAL SYSTEM: HCPCS | Performed by: STUDENT IN AN ORGANIZED HEALTH CARE EDUCATION/TRAINING PROGRAM

## 2022-07-11 PROCEDURE — 93005 ELECTROCARDIOGRAM TRACING: CPT | Performed by: EMERGENCY MEDICINE

## 2022-07-11 PROCEDURE — 25010000002 HYDROMORPHONE 1 MG/ML SOLUTION: Performed by: ANESTHESIOLOGY

## 2022-07-11 PROCEDURE — G0378 HOSPITAL OBSERVATION PER HR: HCPCS

## 2022-07-11 PROCEDURE — 83690 ASSAY OF LIPASE: CPT | Performed by: NURSE PRACTITIONER

## 2022-07-11 PROCEDURE — 99220 PR INITIAL OBSERVATION CARE/DAY 70 MINUTES: CPT | Performed by: INTERNAL MEDICINE

## 2022-07-11 PROCEDURE — 85025 COMPLETE CBC W/AUTO DIFF WBC: CPT | Performed by: INTERNAL MEDICINE

## 2022-07-11 PROCEDURE — 74177 CT ABD & PELVIS W/CONTRAST: CPT

## 2022-07-11 PROCEDURE — 47562 LAPAROSCOPIC CHOLECYSTECTOMY: CPT | Performed by: SPECIALIST/TECHNOLOGIST, OTHER

## 2022-07-11 PROCEDURE — 0 IOPAMIDOL PER 1 ML: Performed by: EMERGENCY MEDICINE

## 2022-07-11 PROCEDURE — 80053 COMPREHEN METABOLIC PANEL: CPT | Performed by: NURSE PRACTITIONER

## 2022-07-11 PROCEDURE — 84703 CHORIONIC GONADOTROPIN ASSAY: CPT | Performed by: INTERNAL MEDICINE

## 2022-07-11 PROCEDURE — 76705 ECHO EXAM OF ABDOMEN: CPT

## 2022-07-11 PROCEDURE — 25010000002 FENTANYL CITRATE (PF) 50 MCG/ML SOLUTION: Performed by: ANESTHESIOLOGIST ASSISTANT

## 2022-07-11 PROCEDURE — 96365 THER/PROPH/DIAG IV INF INIT: CPT

## 2022-07-11 PROCEDURE — 84484 ASSAY OF TROPONIN QUANT: CPT | Performed by: NURSE PRACTITIONER

## 2022-07-11 PROCEDURE — C9803 HOPD COVID-19 SPEC COLLECT: HCPCS

## 2022-07-11 PROCEDURE — 25010000002 DEXAMETHASONE PER 1 MG: Performed by: ANESTHESIOLOGIST ASSISTANT

## 2022-07-11 PROCEDURE — 25010000002 SUCCINYLCHOLINE PER 20 MG: Performed by: ANESTHESIOLOGIST ASSISTANT

## 2022-07-11 PROCEDURE — 81003 URINALYSIS AUTO W/O SCOPE: CPT | Performed by: NURSE PRACTITIONER

## 2022-07-11 PROCEDURE — 99244 OFF/OP CNSLTJ NEW/EST MOD 40: CPT | Performed by: STUDENT IN AN ORGANIZED HEALTH CARE EDUCATION/TRAINING PROGRAM

## 2022-07-11 PROCEDURE — 80061 LIPID PANEL: CPT | Performed by: NURSE PRACTITIONER

## 2022-07-11 PROCEDURE — 96375 TX/PRO/DX INJ NEW DRUG ADDON: CPT

## 2022-07-11 PROCEDURE — 87635 SARS-COV-2 COVID-19 AMP PRB: CPT | Performed by: INTERNAL MEDICINE

## 2022-07-11 PROCEDURE — 83880 ASSAY OF NATRIURETIC PEPTIDE: CPT | Performed by: NURSE PRACTITIONER

## 2022-07-11 PROCEDURE — 47562 LAPAROSCOPIC CHOLECYSTECTOMY: CPT | Performed by: STUDENT IN AN ORGANIZED HEALTH CARE EDUCATION/TRAINING PROGRAM

## 2022-07-11 PROCEDURE — 25010000002 PROPOFOL 10 MG/ML EMULSION: Performed by: ANESTHESIOLOGIST ASSISTANT

## 2022-07-11 PROCEDURE — 25010000002 PIPERACILLIN SOD-TAZOBACTAM PER 1 G: Performed by: NURSE PRACTITIONER

## 2022-07-11 PROCEDURE — 36415 COLL VENOUS BLD VENIPUNCTURE: CPT

## 2022-07-11 PROCEDURE — 25010000002 HYDROMORPHONE PER 4 MG: Performed by: ANESTHESIOLOGIST ASSISTANT

## 2022-07-11 PROCEDURE — 25010000002 ONDANSETRON PER 1 MG: Performed by: ANESTHESIOLOGY

## 2022-07-11 PROCEDURE — 85025 COMPLETE CBC W/AUTO DIFF WBC: CPT | Performed by: NURSE PRACTITIONER

## 2022-07-11 PROCEDURE — 85730 THROMBOPLASTIN TIME PARTIAL: CPT | Performed by: NURSE PRACTITIONER

## 2022-07-11 PROCEDURE — 85379 FIBRIN DEGRADATION QUANT: CPT | Performed by: NURSE PRACTITIONER

## 2022-07-11 PROCEDURE — 25010000002 ONDANSETRON PER 1 MG: Performed by: STUDENT IN AN ORGANIZED HEALTH CARE EDUCATION/TRAINING PROGRAM

## 2022-07-11 PROCEDURE — 71045 X-RAY EXAM CHEST 1 VIEW: CPT

## 2022-07-11 PROCEDURE — 36415 COLL VENOUS BLD VENIPUNCTURE: CPT | Performed by: INTERNAL MEDICINE

## 2022-07-11 PROCEDURE — 25010000002 ONDANSETRON PER 1 MG: Performed by: NURSE PRACTITIONER

## 2022-07-11 PROCEDURE — 85610 PROTHROMBIN TIME: CPT | Performed by: NURSE PRACTITIONER

## 2022-07-11 PROCEDURE — 99284 EMERGENCY DEPT VISIT MOD MDM: CPT

## 2022-07-11 DEVICE — CLIP LIG HEMOLOK PA LG 6CT PRP: Type: IMPLANTABLE DEVICE | Site: ABDOMEN | Status: FUNCTIONAL

## 2022-07-11 RX ORDER — INDOCYANINE GREEN AND WATER 25 MG
2.5 KIT INJECTION ONCE
Status: COMPLETED | OUTPATIENT
Start: 2022-07-11 | End: 2022-07-11

## 2022-07-11 RX ORDER — ONDANSETRON 2 MG/ML
4 INJECTION INTRAMUSCULAR; INTRAVENOUS ONCE AS NEEDED
Status: COMPLETED | OUTPATIENT
Start: 2022-07-11 | End: 2022-07-11

## 2022-07-11 RX ORDER — CHOLECALCIFEROL (VITAMIN D3) 125 MCG
5 CAPSULE ORAL NIGHTLY PRN
Status: DISCONTINUED | OUTPATIENT
Start: 2022-07-11 | End: 2022-07-13 | Stop reason: HOSPADM

## 2022-07-11 RX ORDER — MEPERIDINE HYDROCHLORIDE 25 MG/ML
12.5 INJECTION INTRAMUSCULAR; INTRAVENOUS; SUBCUTANEOUS
Status: DISCONTINUED | OUTPATIENT
Start: 2022-07-11 | End: 2022-07-11 | Stop reason: HOSPADM

## 2022-07-11 RX ORDER — SODIUM CHLORIDE, SODIUM LACTATE, POTASSIUM CHLORIDE, CALCIUM CHLORIDE 600; 310; 30; 20 MG/100ML; MG/100ML; MG/100ML; MG/100ML
1000 INJECTION, SOLUTION INTRAVENOUS CONTINUOUS
Status: DISCONTINUED | OUTPATIENT
Start: 2022-07-11 | End: 2022-07-11

## 2022-07-11 RX ORDER — SODIUM CHLORIDE, SODIUM LACTATE, POTASSIUM CHLORIDE, CALCIUM CHLORIDE 600; 310; 30; 20 MG/100ML; MG/100ML; MG/100ML; MG/100ML
INJECTION, SOLUTION INTRAVENOUS CONTINUOUS PRN
Status: DISCONTINUED | OUTPATIENT
Start: 2022-07-11 | End: 2022-07-11 | Stop reason: SURG

## 2022-07-11 RX ORDER — ACETAMINOPHEN 160 MG/5ML
650 SOLUTION ORAL EVERY 4 HOURS PRN
Status: DISCONTINUED | OUTPATIENT
Start: 2022-07-11 | End: 2022-07-12

## 2022-07-11 RX ORDER — ONDANSETRON 4 MG/1
4 TABLET, FILM COATED ORAL EVERY 6 HOURS PRN
Status: DISCONTINUED | OUTPATIENT
Start: 2022-07-11 | End: 2022-07-13 | Stop reason: HOSPADM

## 2022-07-11 RX ORDER — DEXAMETHASONE SODIUM PHOSPHATE 4 MG/ML
INJECTION, SOLUTION INTRA-ARTICULAR; INTRALESIONAL; INTRAMUSCULAR; INTRAVENOUS; SOFT TISSUE AS NEEDED
Status: DISCONTINUED | OUTPATIENT
Start: 2022-07-11 | End: 2022-07-11 | Stop reason: SURG

## 2022-07-11 RX ORDER — LIDOCAINE HYDROCHLORIDE 10 MG/ML
INJECTION, SOLUTION EPIDURAL; INFILTRATION; INTRACAUDAL; PERINEURAL AS NEEDED
Status: DISCONTINUED | OUTPATIENT
Start: 2022-07-11 | End: 2022-07-11 | Stop reason: SURG

## 2022-07-11 RX ORDER — ACETAMINOPHEN 500 MG
1000 TABLET ORAL EVERY 8 HOURS
Status: DISCONTINUED | OUTPATIENT
Start: 2022-07-12 | End: 2022-07-13 | Stop reason: HOSPADM

## 2022-07-11 RX ORDER — ONDANSETRON 2 MG/ML
4 INJECTION INTRAMUSCULAR; INTRAVENOUS EVERY 6 HOURS PRN
Status: DISCONTINUED | OUTPATIENT
Start: 2022-07-11 | End: 2022-07-13 | Stop reason: HOSPADM

## 2022-07-11 RX ORDER — SODIUM CHLORIDE 0.9 % (FLUSH) 0.9 %
10 SYRINGE (ML) INJECTION EVERY 12 HOURS SCHEDULED
Status: DISCONTINUED | OUTPATIENT
Start: 2022-07-11 | End: 2022-07-13 | Stop reason: HOSPADM

## 2022-07-11 RX ORDER — SODIUM CHLORIDE 0.9 % (FLUSH) 0.9 %
10 SYRINGE (ML) INJECTION AS NEEDED
Status: DISCONTINUED | OUTPATIENT
Start: 2022-07-11 | End: 2022-07-13 | Stop reason: HOSPADM

## 2022-07-11 RX ORDER — FENTANYL CITRATE 50 UG/ML
INJECTION, SOLUTION INTRAMUSCULAR; INTRAVENOUS AS NEEDED
Status: DISCONTINUED | OUTPATIENT
Start: 2022-07-11 | End: 2022-07-11 | Stop reason: SURG

## 2022-07-11 RX ORDER — ACETAMINOPHEN 650 MG/1
650 SUPPOSITORY RECTAL EVERY 4 HOURS PRN
Status: DISCONTINUED | OUTPATIENT
Start: 2022-07-11 | End: 2022-07-11 | Stop reason: SDUPTHER

## 2022-07-11 RX ORDER — HYDROMORPHONE HCL 110MG/55ML
PATIENT CONTROLLED ANALGESIA SYRINGE INTRAVENOUS AS NEEDED
Status: DISCONTINUED | OUTPATIENT
Start: 2022-07-11 | End: 2022-07-11 | Stop reason: SURG

## 2022-07-11 RX ORDER — FAMOTIDINE 10 MG/ML
20 INJECTION, SOLUTION INTRAVENOUS ONCE
Status: COMPLETED | OUTPATIENT
Start: 2022-07-11 | End: 2022-07-11

## 2022-07-11 RX ORDER — ACETAMINOPHEN 160 MG/5ML
650 SOLUTION ORAL EVERY 4 HOURS PRN
Status: DISCONTINUED | OUTPATIENT
Start: 2022-07-11 | End: 2022-07-11 | Stop reason: SDUPTHER

## 2022-07-11 RX ORDER — ACETAMINOPHEN 650 MG/1
650 SUPPOSITORY RECTAL EVERY 4 HOURS PRN
Status: DISCONTINUED | OUTPATIENT
Start: 2022-07-11 | End: 2022-07-12

## 2022-07-11 RX ORDER — ONDANSETRON 2 MG/ML
4 INJECTION INTRAMUSCULAR; INTRAVENOUS ONCE
Status: COMPLETED | OUTPATIENT
Start: 2022-07-11 | End: 2022-07-11

## 2022-07-11 RX ORDER — DEXAMETHASONE SODIUM PHOSPHATE 4 MG/ML
8 INJECTION, SOLUTION INTRA-ARTICULAR; INTRALESIONAL; INTRAMUSCULAR; INTRAVENOUS; SOFT TISSUE ONCE AS NEEDED
Status: DISCONTINUED | OUTPATIENT
Start: 2022-07-11 | End: 2022-07-11 | Stop reason: HOSPADM

## 2022-07-11 RX ORDER — FENTANYL CITRATE 50 UG/ML
50 INJECTION, SOLUTION INTRAMUSCULAR; INTRAVENOUS
Status: DISCONTINUED | OUTPATIENT
Start: 2022-07-11 | End: 2022-07-11 | Stop reason: HOSPADM

## 2022-07-11 RX ORDER — SODIUM CHLORIDE, SODIUM LACTATE, POTASSIUM CHLORIDE, CALCIUM CHLORIDE 600; 310; 30; 20 MG/100ML; MG/100ML; MG/100ML; MG/100ML
100 INJECTION, SOLUTION INTRAVENOUS CONTINUOUS
Status: DISCONTINUED | OUTPATIENT
Start: 2022-07-11 | End: 2022-07-13 | Stop reason: HOSPADM

## 2022-07-11 RX ORDER — OXYCODONE HYDROCHLORIDE 5 MG/1
5 TABLET ORAL EVERY 4 HOURS PRN
Status: DISCONTINUED | OUTPATIENT
Start: 2022-07-11 | End: 2022-07-13 | Stop reason: HOSPADM

## 2022-07-11 RX ORDER — IPRATROPIUM BROMIDE AND ALBUTEROL SULFATE 2.5; .5 MG/3ML; MG/3ML
3 SOLUTION RESPIRATORY (INHALATION) ONCE AS NEEDED
Status: DISCONTINUED | OUTPATIENT
Start: 2022-07-11 | End: 2022-07-11 | Stop reason: HOSPADM

## 2022-07-11 RX ORDER — PROPOFOL 10 MG/ML
VIAL (ML) INTRAVENOUS AS NEEDED
Status: DISCONTINUED | OUTPATIENT
Start: 2022-07-11 | End: 2022-07-11 | Stop reason: SURG

## 2022-07-11 RX ORDER — SUCCINYLCHOLINE CHLORIDE 20 MG/ML
INJECTION INTRAMUSCULAR; INTRAVENOUS AS NEEDED
Status: DISCONTINUED | OUTPATIENT
Start: 2022-07-11 | End: 2022-07-11 | Stop reason: SURG

## 2022-07-11 RX ORDER — ROCURONIUM BROMIDE 10 MG/ML
INJECTION, SOLUTION INTRAVENOUS AS NEEDED
Status: DISCONTINUED | OUTPATIENT
Start: 2022-07-11 | End: 2022-07-11 | Stop reason: SURG

## 2022-07-11 RX ORDER — SODIUM CHLORIDE 9 MG/ML
100 INJECTION, SOLUTION INTRAVENOUS CONTINUOUS
Status: DISCONTINUED | OUTPATIENT
Start: 2022-07-11 | End: 2022-07-11

## 2022-07-11 RX ORDER — ACETAMINOPHEN 325 MG/1
650 TABLET ORAL EVERY 4 HOURS PRN
Status: DISCONTINUED | OUTPATIENT
Start: 2022-07-11 | End: 2022-07-12

## 2022-07-11 RX ORDER — ACETAMINOPHEN 325 MG/1
650 TABLET ORAL EVERY 4 HOURS PRN
Status: DISCONTINUED | OUTPATIENT
Start: 2022-07-11 | End: 2022-07-11 | Stop reason: SDUPTHER

## 2022-07-11 RX ORDER — BUPIVACAINE HYDROCHLORIDE AND EPINEPHRINE 2.5; 5 MG/ML; UG/ML
INJECTION, SOLUTION EPIDURAL; INFILTRATION; INTRACAUDAL; PERINEURAL AS NEEDED
Status: DISCONTINUED | OUTPATIENT
Start: 2022-07-11 | End: 2022-07-11 | Stop reason: HOSPADM

## 2022-07-11 RX ADMIN — ROCURONIUM BROMIDE 40 MG: 50 INJECTION, SOLUTION INTRAVENOUS at 16:32

## 2022-07-11 RX ADMIN — FENTANYL CITRATE 100 MCG: 50 INJECTION, SOLUTION INTRAMUSCULAR; INTRAVENOUS at 16:26

## 2022-07-11 RX ADMIN — LIDOCAINE HYDROCHLORIDE 50 MG: 10 INJECTION, SOLUTION EPIDURAL; INFILTRATION; INTRACAUDAL; PERINEURAL at 16:26

## 2022-07-11 RX ADMIN — ROCURONIUM BROMIDE 20 MG: 50 INJECTION, SOLUTION INTRAVENOUS at 17:35

## 2022-07-11 RX ADMIN — SODIUM CHLORIDE, POTASSIUM CHLORIDE, SODIUM LACTATE AND CALCIUM CHLORIDE 1000 ML: 600; 310; 30; 20 INJECTION, SOLUTION INTRAVENOUS at 16:01

## 2022-07-11 RX ADMIN — PROPOFOL 200 MG: 10 INJECTION, EMULSION INTRAVENOUS at 16:26

## 2022-07-11 RX ADMIN — SUCCINYLCHOLINE CHLORIDE 120 MG: 20 INJECTION, SOLUTION INTRAMUSCULAR; INTRAVENOUS at 16:29

## 2022-07-11 RX ADMIN — IOPAMIDOL 100 ML: 755 INJECTION, SOLUTION INTRAVENOUS at 04:39

## 2022-07-11 RX ADMIN — FAMOTIDINE 20 MG: 10 INJECTION INTRAVENOUS at 04:13

## 2022-07-11 RX ADMIN — ONDANSETRON 4 MG: 2 INJECTION INTRAMUSCULAR; INTRAVENOUS at 17:49

## 2022-07-11 RX ADMIN — ONDANSETRON 4 MG: 2 INJECTION INTRAMUSCULAR; INTRAVENOUS at 21:46

## 2022-07-11 RX ADMIN — ONDANSETRON 4 MG: 2 INJECTION INTRAMUSCULAR; INTRAVENOUS at 04:13

## 2022-07-11 RX ADMIN — PIPERACILLIN AND TAZOBACTAM 3.38 G: 3; .375 INJECTION, POWDER, LYOPHILIZED, FOR SOLUTION INTRAVENOUS at 05:47

## 2022-07-11 RX ADMIN — HYDROMORPHONE HYDROCHLORIDE 0.5 MG: 1 INJECTION, SOLUTION INTRAMUSCULAR; INTRAVENOUS; SUBCUTANEOUS at 21:44

## 2022-07-11 RX ADMIN — INDOCYANINE GREEN AND WATER 2.5 MG: KIT at 16:01

## 2022-07-11 RX ADMIN — SODIUM CHLORIDE, SODIUM LACTATE, POTASSIUM CHLORIDE, AND CALCIUM CHLORIDE: .6; .31; .03; .02 INJECTION, SOLUTION INTRAVENOUS at 16:24

## 2022-07-11 RX ADMIN — PIPERACILLIN AND TAZOBACTAM 3.38 G: 3; .375 INJECTION, POWDER, LYOPHILIZED, FOR SOLUTION INTRAVENOUS at 14:46

## 2022-07-11 RX ADMIN — DEXAMETHASONE SODIUM PHOSPHATE 8 MG: 4 INJECTION, SOLUTION INTRAMUSCULAR; INTRAVENOUS at 16:38

## 2022-07-11 RX ADMIN — HYDROMORPHONE HYDROCHLORIDE 2 MG: 2 INJECTION, SOLUTION INTRAMUSCULAR; INTRAVENOUS; SUBCUTANEOUS at 17:05

## 2022-07-11 RX ADMIN — PIPERACILLIN AND TAZOBACTAM 3.38 G: 3; .375 INJECTION, POWDER, LYOPHILIZED, FOR SOLUTION INTRAVENOUS at 23:04

## 2022-07-11 RX ADMIN — SODIUM CHLORIDE, POTASSIUM CHLORIDE, SODIUM LACTATE AND CALCIUM CHLORIDE 100 ML/HR: 600; 310; 30; 20 INJECTION, SOLUTION INTRAVENOUS at 07:41

## 2022-07-11 RX ADMIN — SUGAMMADEX 200 MG: 100 INJECTION, SOLUTION INTRAVENOUS at 17:58

## 2022-07-11 NOTE — CONSULTS
General Surgery Consult Note      Name: Eleanor Bonds ADMIT: 2022   : 1975  PCP: Lizz Moraes APRN    MRN: [id number] LOS: 0 days   AGE/SEX: 46 y.o. female  ROOM: [facility] OR/[facility] OR   [facility]      Patient Care Team:  Lizz Moraes APRN as PCP - General (Nurse Practitioner)  Chief Complaint   Patient presents with   • Chest Pain     Chest pain, vomiting, since 1230 am Soa         Subjective   46-year-old lady with history of hysterectomy, GERD, ureteral stent placement, no significant past medical or surgical history who presented to the emergency department with acute onset epigastric and right upper quadrant abdominal pain as well as nausea vomiting.  She denies chronic abdominal complaints, this started last night and is new for the patient.  She was found to have an elevated white blood cell count, LFTs within normal limits.  CT abdomen pelvis with inflamed appearing gallbladder without stones, right upper quadrant ultrasound confirmed the patient does have gallstones likely cholecystitis.    Past Medical History:   Diagnosis Date   • Cervical cancer (HCC)    • GERD (gastroesophageal reflux disease)    • Low back pain      Past Surgical History:   Procedure Laterality Date   • BACK SURGERY     • CYSTOSCOPY W/ URETERAL STENT PLACEMENT Right 2021    Procedure: CYSTOSCOPY, RIGHT URETEROSCOPY, STONE EXTRACTION, RETROGRADE AND STENT PLACEMENT RIGHT URETER (NO STRING);  Surgeon: Jose Maria Mcelroy MD;  Location: [facility];  Service: Urology;  Laterality: Right;   • HYSTERECTOMY  2020     Family History   Problem Relation Age of Onset   • Diabetes Mother    • Hyperlipidemia Mother    • Hypertension Mother    • Arthritis Mother    • Heart disease Father    • Hyperlipidemia Father    • Hypertension Father    • COPD Father    • Emphysema Father      Social History     Tobacco Use   • Smoking status: Former Smoker   • Smokeless tobacco: Never Used   Vaping Use   • Vaping Use:  Never used   Substance Use Topics   • Alcohol use: Yes     Comment: occ   • Drug use: No     Types: Marijuana     Medications Prior to Admission   Medication Sig Dispense Refill Last Dose   • ibuprofen (ADVIL,MOTRIN) 200 MG tablet Take 200 mg by mouth Every 6 (Six) Hours As Needed for Mild Pain .   Past Week at Unknown time   • omeprazole (priLOSEC) 20 MG capsule Take 20 mg by mouth Daily As Needed.   7/11/2022 at 0000     piperacillin-tazobactam, 3.375 g, Intravenous, Q8H  [MAR Hold] sodium chloride, 10 mL, Intravenous, Q12H  [MAR Hold] sodium chloride, 10 mL, Intravenous, Q12H      lactated ringers, 1,000 mL, Last Rate: 1,000 mL (07/11/22 1601)  lactated ringers, 100 mL/hr, Last Rate: 100 mL/hr (07/11/22 0741)  Pharmacy to Dose Zosyn,       •  [MAR Hold] acetaminophen **OR** [MAR Hold] acetaminophen **OR** [MAR Hold] acetaminophen  •  [MAR Hold] melatonin  •  [MAR Hold] ondansetron **OR** [MAR Hold] ondansetron  •  [MAR Hold] ondansetron **OR** [MAR Hold] ondansetron  •  Pharmacy to Dose Zosyn  •  [COMPLETED] Insert peripheral IV **AND** [MAR Hold] sodium chloride  •  [MAR Hold] sodium chloride  •  [MAR Hold] sodium chloride  Aspirin    Review of Systems     Objective     Vital Signs and Labs:  Vital Signs Patient Vitals for the past 24 hrs:   BP Temp Temp src Pulse Resp SpO2 Height Weight   07/11/22 1524 154/79 -- -- 62 12 100 % -- --   07/11/22 1402 126/68 -- -- 55 -- 98 % -- --   07/11/22 1302 150/82 -- -- 53 -- 100 % -- --   07/11/22 1138 154/82 98.1 °F (36.7 °C) Oral 60 12 100 % -- --   07/11/22 1100 137/75 -- -- 63 -- 100 % -- --   07/11/22 1015 139/83 -- -- 56 -- 98 % -- --   07/11/22 0734 124/61 98.2 °F (36.8 °C) Oral 63 18 99 % -- --   07/11/22 0715 124/62 -- -- 82 -- 95 % -- --   07/11/22 0700 147/84 -- -- 55 -- 99 % -- --   07/11/22 0533 -- -- -- -- -- 97 % -- --   07/11/22 0416 166/92 -- -- 61 -- 100 % -- --   07/11/22 0413 -- -- -- 52 -- 100 % -- --   07/11/22 0404 -- -- -- 58 -- 99 % -- --   07/11/22  "0358 -- -- -- 69 -- 98 % -- --   07/11/22 0349 -- -- -- 57 -- 100 % -- --   07/11/22 0343 -- -- -- 87 -- 100 % -- --   07/11/22 0334 -- -- -- 57 -- 100 % -- --   07/11/22 0328 -- -- -- 50 -- 100 % -- --   07/11/22 0319 -- -- -- 55 -- 99 % -- --   07/11/22 0316 172/90 -- -- 52 -- 99 % -- --   07/11/22 0256 (!) 185/92 -- -- -- -- -- -- --   07/11/22 0255 -- 97.2 °F (36.2 °C) Oral 64 18 100 % 160 cm (63\") 75.6 kg (166 lb 10.7 oz)     I/O:  I/O last 3 completed shifts:  In: 100 [IV Piggyback:100]  Out: -     Physical Exam:  Physical Exam  Constitutional:       General: She is not in acute distress.     Appearance: Normal appearance. She is not ill-appearing.   HENT:      Head: Normocephalic and atraumatic.      Right Ear: External ear normal.      Left Ear: External ear normal.   Eyes:      Extraocular Movements: Extraocular movements intact.      Conjunctiva/sclera: Conjunctivae normal.   Cardiovascular:      Rate and Rhythm: Normal rate and regular rhythm.   Pulmonary:      Effort: Pulmonary effort is normal. No respiratory distress.   Abdominal:      General: There is no distension.      Palpations: Abdomen is soft.      Comments: Right upper quadrant tenderness, no diffuse abdominal tenderness no rebound or guarding   Musculoskeletal:         General: No swelling or deformity.   Skin:     General: Skin is warm and dry.   Neurological:      Mental Status: She is alert and oriented to person, place, and time. Mental status is at baseline.         CBC    Results from last 7 days   Lab Units 07/11/22  0856 07/11/22 0333   WBC 10*3/mm3 17.80* 15.90*   HEMOGLOBIN g/dL 12.9 13.8   PLATELETS 10*3/mm3 329 352     BMP   Results from last 7 days   Lab Units 07/11/22 0333   SODIUM mmol/L 140   POTASSIUM mmol/L 3.8   CHLORIDE mmol/L 101   CO2 mmol/L 27.0   BUN mg/dL 11   CREATININE mg/dL 0.71   GLUCOSE mg/dL 125*     Radiology(recent) CT Abdomen Pelvis With Contrast    Result Date: 7/11/2022  Impression: 1. Gallbladder wall " thickening and pericholecystic edema. Right upper quadrant ultrasound should be considered for further evaluation of acute cholecystitis if clinical warranted. 2. Urinary bladder wall thickening is nonspecific in underdistention. Correlate for UTI or cystitis. Electronically signed by:  Juma Lockhart M.D.  7/11/2022 3:01 AM    XR Chest 1 View    Result Date: 7/11/2022  IMPRESSION : No acute process.[  Electronically Signed By-Hiren Bailey On:7/11/2022 7:47 AM This report was finalized on [id number] by  Hiren Bailey, .    US Abdomen Limited    Result Date: 7/11/2022  Cholelithiasis with sonographic findings compatible with acute cholecystitis    Electronically Signed By-Hiren Bailey On:7/11/2022 10:06 AM This report was finalized on [id number] by  Hiren Bailey, .      I reviewed the patient's new clinical results.    Assessment & Plan       Cholecystitis      46-year-old lady with history of hysterectomy, GERD, ureteral stent placement, no significant past medical or surgical history who presented to the emergency department with acute onset epigastric and right upper quadrant abdominal pain as well as nausea vomiting.  She denies chronic abdominal complaints, this started last night and is new for the patient.  She was found to have an elevated white blood cell count, LFTs within normal limits.  CT abdomen pelvis with inflamed appearing gallbladder without stones, right upper quadrant ultrasound confirmed the patient does have gallstones likely cholecystitis.  I discussed risk, benefits and alternatives to robotic cholecystectomy with patient including bowel injury, infection, bleeding, hepatic artery injury and bile duct injury requiring the need for further interventions and patient and family elected to proceed.      This note was created using Dragon Voice Recognition software.    Clarence Shanks MD  07/11/22  16:33 EDT

## 2022-07-11 NOTE — OP NOTE
Operative Report:    Patient Name:  Eleanor Bonds  YOB: 1975    Date of Surgery:  7/11/2022     Indications:      46-year-old lady with history of hysterectomy, GERD, ureteral stent placement, no significant past medical or surgical history who presented to the emergency department with acute onset epigastric and right upper quadrant abdominal pain as well as nausea vomiting.  She denies chronic abdominal complaints, this started last night and is new for the patient.  She was found to have an elevated white blood cell count, LFTs within normal limits.  CT abdomen pelvis with inflamed appearing gallbladder without stones, right upper quadrant ultrasound confirmed the patient does have gallstones likely cholecystitis.  I discussed risk, benefits and alternatives to robotic cholecystectomy with patient including bowel injury, infection, bleeding, hepatic artery injury and bile duct injury requiring the need for further interventions and patient and family elected to proceed.    Pre-op Diagnosis:   Cholecystitis    Post-Op Diagnosis Codes:  Same    Procedure/CPT® Codes:      Procedure(s):  CHOLECYSTECTOMY LAPAROSCOPIC WITH DAVINCI ROBOT    Staff:  Surgeon(s):  Clarence Shanks MD    Circulator: Mariela Kennedy RN; Davey Leung RN  Scrub Person: Zia Blancas  Assistant: Bennie Villanueva CSA  was responsible for performing the following activities: Retraction, Suction, Irrigation, Suturing, Closing, Placing Dressing and Held/Positioned Camera and their skilled assistance was necessary for the success of this case.        Anesthesia: General    Estimated Blood Loss: 100ml    Implants:    Implant Name Type Inv. Item Serial No.  Lot No. LRB No. Used Action   CLIP LIG HEMOLOK PA LG 6CT PRP - [id number] Implant CLIP LIG HEMOLOK PA LG 6CT PRP  [employer] [id number] N/A 1 Implanted       Specimen:          Specimens     ID Source Type Tests Collected By Collected At Frozen?     A Gallbladder Tissue · TISSUE PATHOLOGY EXAM   Clarence Shanks MD 7/11/22 1534               Findings: Acutely inflamed gallbladder    Complications: None apparent    Description of Procedure:    After risk benefits and alternatives were discussed with patient inform consent was obtained.  She was transported to the operating room placed supine operating room table and underwent general anesthesia.  Her abdomen was prepped and draped in sterile fashion a surgical timeout is completed.    I made an 8 mm incision in the patient's left upper quadrant I inserted a 5 mm Optiview trocar under laparoscopic visualization into the patient's peritoneal cavity.  I insufflated the patient's peritoneum and inspected the area below my entry site with no injury identified.  I inserted an 8 mm trocar in the patient's right lower quadrant and 8 mm trocar at the patient's right rectus muscle a 12 mm trocar at the patient's left rectus muscle and I upsized my entry trocar to an 8 mm robotic trocar.  The patient was placed in reverse Trendelenburg and right side up position.  The robot was brought in and docked.  I assisted with docking the robot and inserting instruments into the patient and then proceeded to the robotic console.    I turned my attention to the right upper quadrant she had an acutely inflamed gallbladder.  I grasped the dome of the gallbladder retracted this in the right upper quadrant I was able to identify the infundibulum open the peritoneum along the anterior and posterior surfaces of the infundibulum and extended this plane up the inferior one third of the gallbladder.  I dissected this triangle carefully all fat and fibrous tissue until there are only 2 structures remaining entering directly into the gallbladder.  I placed 3 clips on the cystic duct and 2 clips on the cystic artery.  I divided the cystic duct with electrocautery between the clips and I divided the cystic artery above the clips with  electrocautery.  I was electrocautery to dissect the gallbladder off of the gallbladder fossa.  There was some oozing from the gallbladder fossa because of the acute inflammation this was controlled with electrocautery.  Once the gallbladder was removed from the liver this was placed in an Endo Catch bag and removed and sent to pathology.    I rescrubbed and used a laparoscopic suction  to suction out the right upper quadrant and irrigate this clean.  I reinspected the gallbladder fossa which was now hemostatic I inspected my clips there is no leakage of bile or blood identified.  The fascia my 12 mm port site was closed with 0 Vicryl suture using laparoscopic suture passer the abdomen was desufflated and trochars were removed.  Skin was reapproximated with 4-0 Vicryl suture.  Surgical glue was placed on the incisions.  Before closure the sponge needle and instrument count was reported to me as correct x2.  The patient was awoken from general anesthesia and transported to the recovery unit without incident.      Clarence Shanks MD     Date: 7/11/2022  Time: 18:10 EDT    This note was created using Dragon Voice Recognition software.

## 2022-07-11 NOTE — ANESTHESIA POSTPROCEDURE EVALUATION
Patient: Eleanor Bonds    Procedure Summary     Date: 07/11/22 Room / Location: [facility] OR 08 / [facility] MAIN OR    Anesthesia Start: 1623 Anesthesia Stop: 1809    Procedure: CHOLECYSTECTOMY LAPAROSCOPIC WITH DAVINCI ROBOT (N/A Abdomen) Diagnosis: (gallstones)    Surgeons: Clarence Shanks MD Provider: Homar Gonzalez MD    Anesthesia Type: general ASA Status: 2          Anesthesia Type: general    Vitals  Vitals Value Taken Time   /73 07/11/22 1910   Temp 97.4 °F (36.3 °C) 07/11/22 1809   Pulse 69 07/11/22 1914   Resp 11 07/11/22 1909   SpO2 97 % 07/11/22 1914   Vitals shown include unvalidated device data.        Post Anesthesia Care and Evaluation    Patient location during evaluation: PACU  Patient participation: complete - patient participated  Level of consciousness: awake  Pain scale: See nurse's notes for pain score.  Pain management: adequate    Airway patency: patent  Anesthetic complications: No anesthetic complications  PONV Status: none  Cardiovascular status: acceptable  Respiratory status: acceptable  Hydration status: acceptable    Comments: Patient seen and examined postoperatively; vital signs stable; SpO2 greater than or equal to 90%; cardiopulmonary status stable; nausea/vomiting adequately controlled; pain adequately controlled; no apparent anesthesia complications; patient discharged from anesthesia care when discharge criteria were met

## 2022-07-11 NOTE — CASE MANAGEMENT/SOCIAL WORK
Discharge Planning Assessment  [facility]     Patient Name: Eleanor Bonds  MRN: [id number]  Today's Date: 7/11/2022    Admit Date: 7/11/2022     Discharge Needs Assessment     Row Name 07/11/22 1320       Living Environment    People in Home child(niels), adult;spouse    Name(s) of People in Home Spouse Que and 18yo daughter    Current Living Arrangements home    Primary Care Provided by self    Provides Primary Care For no one    Family Caregiver if Needed child(niels), adult;spouse    Family Caregiver Names Que    Quality of Family Relationships supportive    Able to Return to Prior Arrangements yes       Resource/Environmental Concerns    Resource/Environmental Concerns none    Transportation Concerns none       Transition Planning    Patient/Family Anticipates Transition to home with family    Patient/Family Anticipated Services at Transition none    Transportation Anticipated family or friend will provide  Que       Discharge Needs Assessment    Equipment Currently Used at Home none    Concerns to be Addressed denies needs/concerns at this time    Anticipated Changes Related to Illness none    Equipment Needed After Discharge none               Discharge Plan     Row Name 07/11/22 1480       Plan    Plan Home with family    Patient/Family in Agreement with Plan yes    Plan Comments  spoke with patient and spouse.Pt reports self to be independent with ADLs and denies use of any dme. Confirms PCP and pharmacy and denies problems affording medications.Que confirms he will provide transportation for patient at discharge. Pt intends to return home at dc and currently denies dc needs              Continued Care and Services - Admitted Since 7/11/2022    Coordination has not been started for this encounter.       Expected Discharge Date and Time     Expected Discharge Date Expected Discharge Time    Jul 12, 2022          Demographic Summary     Row Name 07/11/22 0509       General Information     Admission Type observation    Arrived From home    Referral Source admission list    Reason for Consult discharge planning    Preferred Language English       Contact Information    Permission Granted to Share Info With ;family/designee               Functional Status     Row Name 07/11/22 1319       Functional Status    Usual Activity Tolerance good    Current Activity Tolerance good       Functional Status, IADL    Medications independent    Meal Preparation independent    Housekeeping independent    Laundry independent    Shopping independent              Jayleen Luna RN, [facility]  Office: [phone]  Fax: [phone]  [email or web address]      I met with patient in room wearing PPE: mask and goggles.     Maintained distance greater than six feet and spent </=15 minutes in the room      Jayleen Luna RN

## 2022-07-11 NOTE — ED PROVIDER NOTES
Subjective    Chief Complaint   Patient presents with   • Chest Pain     Chest pain, vomiting, since 1230 am Lizz Macias APRN  Patient's last menstrual period was 03/03/2020.  Allergies   Allergen Reactions   • Aspirin GI Intolerance       Patient is a 46-year-old female presents the ED with complaint of chest pain, epigastric pain and vomiting.  Patient reports this began at 12:30 AM prior to arrival to the ED.  She thought she was having reflux, but took her medication and it had any relief.  She denies any fever chills.  No hematochezia melena.  No hematemesis or coffee-ground emesis.  Onset: 12:30 AM  Location: Epigastrium and chest  Duration: Consistent  Character: Burning  Aggravating Factors: None  Alleviating Factors: None  Radiation: None  Treatments Tried: None          Review of Systems   Constitutional: Negative for chills and fever.   Eyes: Negative for photophobia and visual disturbance.   Respiratory: Negative for cough, chest tightness and shortness of breath.    Cardiovascular: Positive for chest pain.   Gastrointestinal: Positive for nausea and vomiting. Negative for abdominal pain and diarrhea.   Genitourinary: Negative for dysuria.   Musculoskeletal: Negative for back pain and neck pain.   Skin: Negative for rash.   Neurological: Negative for dizziness, syncope, weakness and light-headedness.   Psychiatric/Behavioral: Negative for confusion.       Past Medical History:   Diagnosis Date   • Cervical cancer (HCC)    • GERD (gastroesophageal reflux disease)    • Low back pain        Allergies   Allergen Reactions   • Aspirin GI Intolerance       Past Surgical History:   Procedure Laterality Date   • BACK SURGERY     • CYSTOSCOPY W/ URETERAL STENT PLACEMENT Right 8/8/2021    Procedure: CYSTOSCOPY, RIGHT URETEROSCOPY, STONE EXTRACTION, RETROGRADE AND STENT PLACEMENT RIGHT URETER (NO STRING);  Surgeon: Jos eMaria Mcelroy MD;  Location: [facility] MAIN OR;  Service: Urology;  Laterality:  Right;   • HYSTERECTOMY  12/2020       Family History   Problem Relation Age of Onset   • Diabetes Mother    • Hyperlipidemia Mother    • Hypertension Mother    • Arthritis Mother    • Heart disease Father    • Hyperlipidemia Father    • Hypertension Father    • COPD Father    • Emphysema Father        Social History     Socioeconomic History   • Marital status:    Tobacco Use   • Smoking status: Former Smoker   • Smokeless tobacco: Never Used   Vaping Use   • Vaping Use: Never used   Substance and Sexual Activity   • Alcohol use: Yes     Comment: occ   • Drug use: No     Types: Marijuana   • Sexual activity: Yes           Objective   Physical Exam  Vitals and nursing note reviewed.   Constitutional:       General: She is not in acute distress.     Appearance: She is well-developed. She is ill-appearing. She is not toxic-appearing or diaphoretic.      Comments: Actively vomiting   HENT:      Head: Normocephalic and atraumatic.   Eyes:      Extraocular Movements: Extraocular movements intact.      Pupils: Pupils are equal, round, and reactive to light.   Cardiovascular:      Rate and Rhythm: Normal rate and regular rhythm.      Pulses:           Radial pulses are 2+ on the right side and 2+ on the left side.        Dorsalis pedis pulses are 2+ on the right side and 2+ on the left side.      Heart sounds: Normal heart sounds. No murmur heard.    No friction rub. No gallop.   Pulmonary:      Effort: Pulmonary effort is normal.      Breath sounds: Normal breath sounds.   Abdominal:      General: Abdomen is protuberant. Bowel sounds are normal.      Palpations: Abdomen is soft.      Tenderness: There is abdominal tenderness in the right upper quadrant and epigastric area. There is no guarding or rebound. Negative signs include Meza's sign.   Musculoskeletal:      Cervical back: Normal range of motion and neck supple.      Right lower leg: No tenderness. No edema.      Left lower leg: No tenderness. No edema.  "  Skin:     General: Skin is warm and dry.      Capillary Refill: Capillary refill takes less than 2 seconds.   Neurological:      General: No focal deficit present.      Mental Status: She is alert and oriented to person, place, and time.   Psychiatric:         Mood and Affect: Mood normal.         Behavior: Behavior normal.         Procedures           ED Course  ED Course as of 07/11/22 0534   Mon Jul 11, 2022   0512 EKG reviewed by MD and FARHAD, our findings are: Sinus bradycardia rate of 55.  Compared to previous 4/24/2014. [LB]      ED Course User Index  [LB] Thania Mcmillan, APRN           /92   Pulse 61   Temp 97.2 °F (36.2 °C) (Oral)   Resp 18   Ht 160 cm (63\")   Wt 75.6 kg (166 lb 10.7 oz)   LMP 03/03/2020   SpO2 100%   BMI 29.52 kg/m²   Labs Reviewed   COMPREHENSIVE METABOLIC PANEL - Abnormal; Notable for the following components:       Result Value    Glucose 125 (*)     All other components within normal limits    Narrative:     GFR Normal >60  Chronic Kidney Disease <60  Kidney Failure <15     CBC WITH AUTO DIFFERENTIAL - Abnormal; Notable for the following components:    WBC 15.90 (*)     Neutrophils, Absolute 11.10 (*)     Lymphocytes, Absolute 3.40 (*)     All other components within normal limits   LIPASE - Normal   TROPONIN (IN-HOUSE) - Normal    Narrative:     Troponin T Reference Range:  <= 0.03 ng/mL-   Negative for AMI  >0.03 ng/mL-     Abnormal for myocardial necrosis.  Clinicians would have to utilize clinical acumen, EKG, Troponin and serial changes to determine if it is an Acute Myocardial Infarction or myocardial injury due to an underlying chronic condition.       Results may be falsely decreased if patient taking Biotin.     BNP (IN-HOUSE) - Normal    Narrative:     Among patients with dyspnea, NT-proBNP is highly sensitive for the detection of acute congestive heart failure. In addition NT-proBNP of <300 pg/ml effectively rules out acute congestive heart failure with 99% " negative predictive value.    Results may be falsely decreased if patient taking Biotin.     D-DIMER, QUANTITATIVE - Normal    Narrative:     Reference Range  --------------------------------------------------------------------     < 0.50   Negative Predictive Value  0.50-0.59   Indeterminate    >= 0.60   Probable VTE             A very low percentage of patients with DVT may yield D-Dimer results   below the cut-off of 0.50 mg/L FEU.  This is known to be more   prevalent in patients with distal DVT.             Results of this test should always be interpreted in conjunction with   the patient's medical history, clinical presentation and other   findings.  Clinical diagnosis should not be based on the result of   [place] D-Dimer alone.   RAINBOW DRAW    Narrative:     The following orders were created for panel order [place] Draw.  Procedure                               Abnormality         Status                     ---------                               -----------         ------                     Green Top (Gel)[[id number]]                                  Final result               Lavender Top[[id number]]                                     Final result               Gold Top - SST[[id number]]                                   Final result               Light Blue Top[[id number]]                                   Final result                 Please view results for these tests on the individual orders.   URINALYSIS W/ MICROSCOPIC IF INDICATED (NO CULTURE)   LIPID PANEL   PROTIME-INR   APTT   GREEN TOP   LAVENDER TOP   GOLD TOP - SST   LIGHT BLUE TOP   CBC AND DIFFERENTIAL    Narrative:     The following orders were created for panel order CBC & Differential.  Procedure                               Abnormality         Status                     ---------                               -----------         ------                     CBC Auto Differential[[id number]]        Abnormal            Final result                  Please view results for these tests on the individual orders.     Medications   sodium chloride 0.9 % flush 10 mL (has no administration in time range)   piperacillin-tazobactam (ZOSYN) IVPB 3.375 g in 100 mL NS (CD) (has no administration in time range)   sodium chloride 0.9 % flush 10 mL (has no administration in time range)   sodium chloride 0.9 % flush 10 mL (has no administration in time range)   acetaminophen (TYLENOL) tablet 650 mg (has no administration in time range)     Or   acetaminophen (TYLENOL) 160 MG/5ML solution 650 mg (has no administration in time range)     Or   acetaminophen (TYLENOL) suppository 650 mg (has no administration in time range)   ondansetron (ZOFRAN) tablet 4 mg (has no administration in time range)     Or   ondansetron (ZOFRAN) injection 4 mg (has no administration in time range)   lactated ringers infusion (has no administration in time range)   ondansetron (ZOFRAN) injection 4 mg (4 mg Intravenous Given 7/11/22 0413)   famotidine (PEPCID) injection 20 mg (20 mg Intravenous Given 7/11/22 0413)   iopamidol (ISOVUE-370) 76 % injection 100 mL (100 mL Intravenous Given 7/11/22 1399)     CT Abdomen Pelvis With Contrast    Result Date: 7/11/2022  Impression: 1. Gallbladder wall thickening and pericholecystic edema. Right upper quadrant ultrasound should be considered for further evaluation of acute cholecystitis if clinical warranted. 2. Urinary bladder wall thickening is nonspecific in underdistention. Correlate for UTI or cystitis. Electronically signed by:  Juma Lockhart M.D.  7/11/2022 3:01 AM                                    MDM  Number of Diagnoses or Management Options     Amount and/or Complexity of Data Reviewed  Clinical lab tests: reviewed  Tests in the radiology section of CPT®: reviewed  Tests in the medicine section of CPT®: reviewed    Appropriate PPE was worn during the duration of the care for this patient while in the emergency department per [facility]  Policy    --Discussed with ED attending Physician: Dr. Armenta  --Differentials: Gastritis, GERD, STEMI, cholecystitis  This list is not all inclusive and does not constitute the entireity of considered causes.   --Patient was brought back to the emergency department room for evaluation and placed on appropriate monitoring.      Patient had IV established and blood work obtained    Ultrasound not here tonight.  We will proceed with CT for evaluation of epigastric pain, nausea vomiting.    --ED Course /Labs/Imaging/Studies: Patient and review of exam and work-up for epigastric pain, nausea, vomiting.  CT was obtained, and reveals findings concerning for pericholecystic edema, bladder wall thickening.  I feel this is likely the cause of the patient's symptoms given her vomiting and location of her pain.  Patient will be admitted to hospital service for further evaluation management.  I ordered a gallbladder ultrasound for this a.m., as well as inpatient consult to general surgery.  Patient started on Zosyn.    --Disposition: I discussed with the patient their test results, work-up here in the emergency department, and need for admission and further evaluation. Patient is agreeable to the plan of care. At time of disposition patients VS are non concerning, and without acute distress.  Opportunity was provided for questions at the bedside, all questions and concerns were addressed.      This document is intended for medical expert use only. Reading of this document by patients and/or patient's family without participating medical staff guidance may result in misinterpretation and unintended morbidity.  Any interpretation of such data is the responsibility of the patient and/or family member responsible for the patient in concert with their primary or specialist providers, not to be left for sources of online searches such as [employer], [employer] or similar queries. Relying on these approaches to knowledge may result in  misinterpretation, misguided goals of care and even death should patients or family members try recommendations outside of the realm of professional medical care in a supervised inpatient environment.                                          Final diagnoses:   Upper abdominal pain   Nausea and vomiting, unspecified vomiting type   Chest pain, unspecified type       ED Disposition  ED Disposition     ED Disposition   Decision to Admit    Condition   --    Comment   Level of Care: Telemetry [5]   Admitting Physician: SEJAL BARNHART [[id number]]   Attending Physician: SEJAL BARNHART [[id number]]               No follow-up provider specified.       Medication List      No changes were made to your prescriptions during this visit.          Thania Mcmillan, APRN  07/11/22 0535

## 2022-07-11 NOTE — H&P
[facility] Medicine Services      Patient Name: Eleanor Bonds  : 1975  MRN: [id number]  Primary Care Physician:  Lizz Moraes APRN  Date of admission: 2022      Subjective      Chief Complaint: chest  pain  History of Present Illness: Eleanor Bonds is a 46 y.o. female who presented to [facility] on 2022     46-year-old female with history of cervical cancer GERD low back pain kidney stones presenting with chest pain and epigastric pain with vomiting starting around 1230 this morning.  Pain is not relieved with acid reflux medications.  She denies any bleeding anywhere  Pain was burning sensation without specific relieving or postdating factors  Patient had EKG that showed sinus bradycardia rate 55 without ischemic changes  Patient had CT abdomen pelvis with contrast showed gallbladder wall thickening pericholecystic edema possible cholecystitis underdistended urinary bladder.  Patient had uncontrolled hypertension but improved and was afebrile saturating well on room air  She had elevated white count 15.9 urinalysis showing alkaline pH no signs of UTI negative COVID screening chest x-ray without acute findings negative initial troponin and normal proBNP  Right upper quadrant ultrasound has been ordered and pending  General surgery consulted  Patient started on Zosyn      ROS  All other system review negative except as above    Personal History     Past Medical History:   Diagnosis Date   • Cervical cancer (HCC)    • GERD (gastroesophageal reflux disease)    • Low back pain        Past Surgical History:   Procedure Laterality Date   • BACK SURGERY     • CYSTOSCOPY W/ URETERAL STENT PLACEMENT Right 2021    Procedure: CYSTOSCOPY, RIGHT URETEROSCOPY, STONE EXTRACTION, RETROGRADE AND STENT PLACEMENT RIGHT URETER (NO STRING);  Surgeon: Jose Maria Mcelroy MD;  Location: [facility];  Service: Urology;  Laterality: Right;   • HYSTERECTOMY  2020        Family History: family history includes Arthritis in her mother; COPD in her father; Diabetes in her mother; Emphysema in her father; Heart disease in her father; Hyperlipidemia in her father and mother; Hypertension in her father and mother. Otherwise pertinent FHx was reviewed and not pertinent to current issue.    Social History:  reports that she has quit smoking. She has never used smokeless tobacco. She reports current alcohol use. She reports that she does not use drugs.    Home Medications:  Prior to Admission Medications     Prescriptions Last Dose Informant Patient Reported? Taking?    ibuprofen (ADVIL,MOTRIN) 200 MG tablet Past Week  Yes Yes    Take 200 mg by mouth Every 6 (Six) Hours As Needed for Mild Pain .    omeprazole (priLOSEC) 20 MG capsule 7/11/2022  Yes Yes    Take 20 mg by mouth Daily As Needed.            Allergies:  Allergies   Allergen Reactions   • Aspirin GI Intolerance       Objective      Vitals:   Temp:  [97.2 °F (36.2 °C)-98.2 °F (36.8 °C)] 98.2 °F (36.8 °C)  Heart Rate:  [50-87] 63  Resp:  [18] 18  BP: (124-185)/(61-92) 124/61    Physical Exam *  GENERAL APPEARANCE: Well developed, well nourished, alert and cooperative, and appears to be in no acute distress.  HEAD: normocephalic.  EYES: PERRL, EOMI. vision intact grossly.  EARS: Intact hearing.  No gross abnormalities.  NOSE: No nasal discharge.  THROAT: Clear   NECK: Neck supple, non-tender without lymphadenopathy, masses or thyromegaly.  CARDIAC: Normal S1 and S2. No S3, S4 or murmurs. Rhythm is regular. There is no peripheral edema, cyanosis or pallor. Extremities are warm and well perfused. Capillary refill is less than 2 seconds. No carotid bruits.  LUNGS: Clear to auscultation and percussion without rales, rhonchi, wheezing or diminished breath sounds.  ABDOMEN:abdominal tenderness in the right upper quadrant and epigastric area. There is no guarding or rebound. Negative signs include Meza's sign. MUSKULOSKELETAL: No  deformity or swelling   BACK: No abnormalities noted     EXTREMITIES: No significant deformity or joint abnormality. No edema. Peripheral pulses intact. No varicosities.  LOWER EXTREMITY: No edema or swelling  NEUROLOGICAL: CN II-XII intact. Strength and sensation symmetric and intact throughout. Reflexes 2+ throughout. Cerebellar testing normal.  SKIN: Skin normal color, texture and turgor with no lesions or eruptions.  PSYCHIATRIC: Alert cooperative not suicidal         Result Review    Result Review:  I have personally reviewed the results from the time of this admission to 7/11/2022 08:38 EDT and agree with these findings:  [x]  Laboratory  [x]  Microbiology  [x]  Radiology  []  EKG/Telemetry   []  Cardiology/Vascular   []  Pathology  []  Old records  []  Other:  Most notable findings include  As above  Assessment & Plan        Active Hospital Problems:  Active Hospital Problems    Diagnosis    • **Cholecystitis      Plan:   Acute cholecystitis  GERD  Chest pain possible related to gallbladder disease/acid reflux  Negative initial troponin and EKG   Continue Zosyn  General surgery consulted  Keep n.p.o.  RUQ ordered and pending  PPI  Supportive care  Avoid NSAIDs.  Consider follow-up with GI as she may have additional pathology including gastritis/ulceration.  Keep on a PPI for now.        History of cervical cancer    Leukocytosis without fever likely due to cholecystitis  No other signs of sepsis    Hypertension not on any home medication might be related to severity of pain  Will monitora    DVT prophylaxis with SCD given need for surgery    DVT prophylaxis:  Mechanical DVT prophylaxis orders are present.    CODE STATUS:    Code Status (Patient has no pulse and is not breathing): CPR (Attempt to Resuscitate)  Medical Interventions (Patient has pulse or is breathing): Full Support    Admission Status:  I believe this patient meets inpatient status.    I discussed the patient's findings and my recommendations  with patient and consulting provider.    This patient has been examined wearing appropriate Personal Protective Equipment and discussed with hospital infection control department. 07/11/22      Signature    Electronically signed by Missael Card MD, 07/11/22, 8:42 AM EDT.  [facility] Hospitalist Team

## 2022-07-12 LAB
ALBUMIN SERPL-MCNC: 4 G/DL (ref 3.5–5.2)
ALBUMIN/GLOB SERPL: 1.5 G/DL
ALP SERPL-CCNC: 80 U/L (ref 39–117)
ALT SERPL W P-5'-P-CCNC: 21 U/L (ref 1–33)
ANION GAP SERPL CALCULATED.3IONS-SCNC: 11 MMOL/L (ref 5–15)
AST SERPL-CCNC: 27 U/L (ref 1–32)
BASOPHILS # BLD AUTO: 0.1 10*3/MM3 (ref 0–0.2)
BASOPHILS NFR BLD AUTO: 0.6 % (ref 0–1.5)
BILIRUB SERPL-MCNC: 0.5 MG/DL (ref 0–1.2)
BUN SERPL-MCNC: 6 MG/DL (ref 6–20)
BUN/CREAT SERPL: 10.2 (ref 7–25)
CALCIUM SPEC-SCNC: 8.3 MG/DL (ref 8.6–10.5)
CHLORIDE SERPL-SCNC: 102 MMOL/L (ref 98–107)
CO2 SERPL-SCNC: 24 MMOL/L (ref 22–29)
CREAT SERPL-MCNC: 0.59 MG/DL (ref 0.57–1)
DEPRECATED RDW RBC AUTO: 42.4 FL (ref 37–54)
EGFRCR SERPLBLD CKD-EPI 2021: 112.7 ML/MIN/1.73
EOSINOPHIL # BLD AUTO: 0 10*3/MM3 (ref 0–0.4)
EOSINOPHIL NFR BLD AUTO: 0 % (ref 0.3–6.2)
ERYTHROCYTE [DISTWIDTH] IN BLOOD BY AUTOMATED COUNT: 14.2 % (ref 12.3–15.4)
GLOBULIN UR ELPH-MCNC: 2.6 GM/DL
GLUCOSE BLDC GLUCOMTR-MCNC: 103 MG/DL (ref 70–105)
GLUCOSE BLDC GLUCOMTR-MCNC: 117 MG/DL (ref 70–105)
GLUCOSE SERPL-MCNC: 144 MG/DL (ref 65–99)
HBA1C MFR BLD: 5.3 % (ref 3.5–5.6)
HCT VFR BLD AUTO: 41.9 % (ref 34–46.6)
HGB BLD-MCNC: 14 G/DL (ref 12–15.9)
LIPASE SERPL-CCNC: 28 U/L (ref 13–60)
LYMPHOCYTES # BLD AUTO: 1.1 10*3/MM3 (ref 0.7–3.1)
LYMPHOCYTES NFR BLD AUTO: 4.3 % (ref 19.6–45.3)
MCH RBC QN AUTO: 28.1 PG (ref 26.6–33)
MCHC RBC AUTO-ENTMCNC: 33.3 G/DL (ref 31.5–35.7)
MCV RBC AUTO: 84.4 FL (ref 79–97)
MONOCYTES # BLD AUTO: 0.6 10*3/MM3 (ref 0.1–0.9)
MONOCYTES NFR BLD AUTO: 2.4 % (ref 5–12)
NEUTROPHILS NFR BLD AUTO: 24.1 10*3/MM3 (ref 1.7–7)
NEUTROPHILS NFR BLD AUTO: 92.7 % (ref 42.7–76)
NRBC BLD AUTO-RTO: 0 /100 WBC (ref 0–0.2)
PLATELET # BLD AUTO: 328 10*3/MM3 (ref 140–450)
PMV BLD AUTO: 8.9 FL (ref 6–12)
POTASSIUM SERPL-SCNC: 4 MMOL/L (ref 3.5–5.2)
PROT SERPL-MCNC: 6.6 G/DL (ref 6–8.5)
RBC # BLD AUTO: 4.96 10*6/MM3 (ref 3.77–5.28)
SODIUM SERPL-SCNC: 137 MMOL/L (ref 136–145)
WBC NRBC COR # BLD: 26 10*3/MM3 (ref 3.4–10.8)

## 2022-07-12 PROCEDURE — 80053 COMPREHEN METABOLIC PANEL: CPT | Performed by: STUDENT IN AN ORGANIZED HEALTH CARE EDUCATION/TRAINING PROGRAM

## 2022-07-12 PROCEDURE — 82962 GLUCOSE BLOOD TEST: CPT

## 2022-07-12 PROCEDURE — 25010000002 PROCHLORPERAZINE 10 MG/2ML SOLUTION: Performed by: NURSE PRACTITIONER

## 2022-07-12 PROCEDURE — 25010000002 PIPERACILLIN SOD-TAZOBACTAM PER 1 G: Performed by: INTERNAL MEDICINE

## 2022-07-12 PROCEDURE — 99225 PR SBSQ OBSERVATION CARE/DAY 25 MINUTES: CPT | Performed by: INTERNAL MEDICINE

## 2022-07-12 PROCEDURE — 83690 ASSAY OF LIPASE: CPT | Performed by: STUDENT IN AN ORGANIZED HEALTH CARE EDUCATION/TRAINING PROGRAM

## 2022-07-12 PROCEDURE — 83036 HEMOGLOBIN GLYCOSYLATED A1C: CPT | Performed by: INTERNAL MEDICINE

## 2022-07-12 PROCEDURE — G0378 HOSPITAL OBSERVATION PER HR: HCPCS

## 2022-07-12 PROCEDURE — 99024 POSTOP FOLLOW-UP VISIT: CPT | Performed by: STUDENT IN AN ORGANIZED HEALTH CARE EDUCATION/TRAINING PROGRAM

## 2022-07-12 PROCEDURE — 85025 COMPLETE CBC W/AUTO DIFF WBC: CPT | Performed by: NURSE PRACTITIONER

## 2022-07-12 RX ORDER — AMLODIPINE BESYLATE 5 MG/1
10 TABLET ORAL
Status: DISCONTINUED | OUTPATIENT
Start: 2022-07-12 | End: 2022-07-13 | Stop reason: HOSPADM

## 2022-07-12 RX ORDER — HYDRALAZINE HYDROCHLORIDE 20 MG/ML
5 INJECTION INTRAMUSCULAR; INTRAVENOUS EVERY 6 HOURS PRN
Status: DISCONTINUED | OUTPATIENT
Start: 2022-07-12 | End: 2022-07-13 | Stop reason: HOSPADM

## 2022-07-12 RX ORDER — PROCHLORPERAZINE 25 MG
25 SUPPOSITORY, RECTAL RECTAL EVERY 12 HOURS PRN
Status: DISCONTINUED | OUTPATIENT
Start: 2022-07-12 | End: 2022-07-13 | Stop reason: HOSPADM

## 2022-07-12 RX ORDER — PROCHLORPERAZINE MALEATE 5 MG/1
5 TABLET ORAL EVERY 6 HOURS PRN
Status: DISCONTINUED | OUTPATIENT
Start: 2022-07-12 | End: 2022-07-13 | Stop reason: HOSPADM

## 2022-07-12 RX ORDER — PROCHLORPERAZINE EDISYLATE 5 MG/ML
5 INJECTION INTRAMUSCULAR; INTRAVENOUS EVERY 6 HOURS PRN
Status: DISCONTINUED | OUTPATIENT
Start: 2022-07-12 | End: 2022-07-13 | Stop reason: HOSPADM

## 2022-07-12 RX ADMIN — SODIUM CHLORIDE, POTASSIUM CHLORIDE, SODIUM LACTATE AND CALCIUM CHLORIDE 100 ML/HR: 600; 310; 30; 20 INJECTION, SOLUTION INTRAVENOUS at 15:48

## 2022-07-12 RX ADMIN — PIPERACILLIN AND TAZOBACTAM 3.38 G: 3; .375 INJECTION, POWDER, LYOPHILIZED, FOR SOLUTION INTRAVENOUS at 15:46

## 2022-07-12 RX ADMIN — Medication 10 ML: at 22:42

## 2022-07-12 RX ADMIN — ACETAMINOPHEN 1000 MG: 500 TABLET ORAL at 15:46

## 2022-07-12 RX ADMIN — PROCHLORPERAZINE EDISYLATE 5 MG: 5 INJECTION INTRAMUSCULAR; INTRAVENOUS at 00:15

## 2022-07-12 RX ADMIN — PIPERACILLIN AND TAZOBACTAM 3.38 G: 3; .375 INJECTION, POWDER, LYOPHILIZED, FOR SOLUTION INTRAVENOUS at 22:41

## 2022-07-12 RX ADMIN — Medication 10 ML: at 09:13

## 2022-07-12 RX ADMIN — OXYCODONE 5 MG: 5 TABLET ORAL at 22:48

## 2022-07-12 RX ADMIN — ACETAMINOPHEN 1000 MG: 500 TABLET ORAL at 07:52

## 2022-07-12 RX ADMIN — OXYCODONE 5 MG: 5 TABLET ORAL at 00:15

## 2022-07-12 RX ADMIN — OXYCODONE 5 MG: 5 TABLET ORAL at 15:46

## 2022-07-12 RX ADMIN — AMLODIPINE BESYLATE 10 MG: 5 TABLET ORAL at 12:58

## 2022-07-12 RX ADMIN — OXYCODONE 5 MG: 5 TABLET ORAL at 07:53

## 2022-07-12 NOTE — PROGRESS NOTES
Abdominal pain  Patient admitted for    [facility] Medicine Services Daily Progress Note    Patient Name: Eleanor Bonds  : 1975  MRN: [id number]  Primary Care Physician:  Lizz Moraes APRN  Date of admission: 2022      Subjective      Chief Complaint: Abdominal pain  Patient admitted for acute cholecystitis and underwent cholecystectomy        Patient Reports      White count is higher than yesterday but patient afebrile  Liver enzymes normal  Denies vomiting  General surgery would like to keep her today  Blood pressure elevated and will start treatment with Norvasc) hydralazine  Probable fasting hyperglycemia.  Check A1c level  ROS  All other systems reviewed and negative except as above      Objective      Vitals:   Temp:  [97.4 °F (36.3 °C)-98.8 °F (37.1 °C)] 98.8 °F (37.1 °C)  Heart Rate:  [50-92] 55  Resp:  [9-18] 18  BP: (126-187)/(67-91) 187/91  Flow (L/min):  [5-8] 5    Physical Exam   GENERAL APPEARANCE: Well developed, well nourished, alert and cooperative, and appears to be in no acute distress.  HEAD: normocephalic.  EYES: PERRL, EOMI. vision intact grossly.  EARS: Intact hearing.  No gross abnormalities.  NOSE: No nasal discharge.  THROAT: Clear   NECK: Neck supple, non-tender without lymphadenopathy, masses or thyromegaly.  CARDIAC: Normal S1 and S2. No S3, S4 or murmurs. Rhythm is regular. There is no peripheral edema, cyanosis or pallor. Extremities are warm and well perfused. Capillary refill is less than 2 seconds. No carotid bruits.  LUNGS: Clear to auscultation and percussion without rales, rhonchi, wheezing or diminished breath sounds.  ABDOMEN: Positive bowel sounds. Soft, nondistended, nontender. No guarding or rebound. No masses.  MUSKULOSKELETAL: No deformity or swelling   BACK: No abnormalities noted     EXTREMITIES: No significant deformity or joint abnormality. No edema. Peripheral pulses intact. No varicosities.  LOWER EXTREMITY: No edema or  swelling  NEUROLOGICAL: CN II-XII intact. Strength and sensation symmetric and intact throughout. Reflexes 2+ throughout. Cerebellar testing normal.  SKIN: Skin normal color, texture and turgor with no lesions or eruptions.  PSYCHIATRIC: Alert cooperative not suicidal            Result Review    Result Review:  I have personally reviewed the results from the time of this admission to 7/12/2022 12:18 EDT and agree with these findings:  [x]  Laboratory  [x]  Microbiology  [x]  Radiology  []  EKG/Telemetry   []  Cardiology/Vascular   []  Pathology  []  Old records  []  Other:  Most notable findings include: As outlined above    Wounds (last 24 hours)     LDA Wound     Row Name 07/12/22 0800 07/11/22 2320 07/11/22 2300       Wound 07/11/22 1653 anterior abdomen Puncture    Wound - Properties Group Placement Date: 07/11/22  -RS Placement Time: 1653 -RS Present on Hospital Admission: N  -RS Orientation: anterior  -RS Location: abdomen  -RS Primary Wound Type: Puncture  -RS    Dressing Appearance dry;intact;no drainage  -DA dry;intact;no drainage;open to air  -TL dry;intact;no drainage;open to air  -AV    Closure Approximated;Liquid skin adhesive  -DA -- Approximated;Liquid skin adhesive  -AV    Dressing Care other (see comments)  exofix x 4 sites  -DA -- --    Retired Wound - Properties Group Placement Date: 07/11/22 -RS Placement Time: 1653 -RS Present on Hospital Admission: N  -RS Orientation: anterior  -RS Location: abdomen  -RS Primary Wound Type: Puncture  -RS    Retired Wound - Properties Group Date first assessed: 07/11/22  -RS Time first assessed: 1653 -RS Present on Hospital Admission: N  -RS Location: abdomen  -RS Primary Wound Type: Puncture  -RS    Row Name 07/11/22 2200 07/11/22 2109 07/11/22 2009       Wound 07/11/22 1653 anterior abdomen Puncture    Wound - Properties Group Placement Date: 07/11/22  -RS Placement Time: 1653 -RS Present on Hospital Admission: N  -RS Orientation: anterior  -RS Location:  abdomen  -RS Primary Wound Type: Puncture  -RS    Dressing Appearance dry;intact;no drainage;open to air  -AV dry;intact;no drainage;open to air  -KS dry;intact;no drainage;open to air  -KS    Closure Approximated;Liquid skin adhesive  -AV Approximated;Liquid skin adhesive  -KS Approximated;Liquid skin adhesive  -KS    Retired Wound - Properties Group Placement Date: 07/11/22  -RS Placement Time: 1653 -RS Present on Hospital Admission: N  -RS Orientation: anterior  -RS Location: abdomen  -RS Primary Wound Type: Puncture  -RS    Retired Wound - Properties Group Date first assessed: 07/11/22  -RS Time first assessed: 1653  -RS Present on Hospital Admission: N  -RS Location: abdomen  -RS Primary Wound Type: Puncture  -RS    Row Name 07/11/22 1954 07/11/22 1939 07/11/22 1924       Wound 07/11/22 1653 anterior abdomen Puncture    Wound - Properties Group Placement Date: 07/11/22  -RS Placement Time: 1653 -RS Present on Hospital Admission: N  -RS Orientation: anterior  -RS Location: abdomen  -RS Primary Wound Type: Puncture  -RS    Dressing Appearance dry;intact;no drainage;open to air  -KS dry;intact;no drainage;open to air  -KS dry;intact;no drainage;open to air  -KS    Closure Approximated;Liquid skin adhesive  -KS Approximated;Liquid skin adhesive  -KS Approximated;Liquid skin adhesive  -KS    Retired Wound - Properties Group Placement Date: 07/11/22  -RS Placement Time: 1653 -RS Present on Hospital Admission: N  -RS Orientation: anterior  -RS Location: abdomen  -RS Primary Wound Type: Puncture  -RS    Retired Wound - Properties Group Date first assessed: 07/11/22  -RS Time first assessed: 1653  -RS Present on Hospital Admission: N  -RS Location: abdomen  -RS Primary Wound Type: Puncture  -RS    Row Name 07/11/22 1909 07/11/22 1854 07/11/22 1839       Wound 07/11/22 1653 anterior abdomen Puncture    Wound - Properties Group Placement Date: 07/11/22  -RS Placement Time: 1653 -RS Present on Hospital Admission: N   -RS Orientation: anterior  -RS Location: abdomen  -RS Primary Wound Type: Puncture  -RS    Dressing Appearance dry;intact;no drainage;open to air  -KS dry;intact;no drainage;open to air  -KS dry;intact;no drainage;open to air  -KS    Closure Approximated;Liquid skin adhesive  -KS Approximated;Liquid skin adhesive  -KS Approximated;Liquid skin adhesive  -KS    Retired Wound - Properties Group Placement Date: 07/11/22 -RS Placement Time: 1653 -RS Present on Hospital Admission: N  -RS Orientation: anterior  -RS Location: abdomen  -RS Primary Wound Type: Puncture  -RS    Retired Wound - Properties Group Date first assessed: 07/11/22  -RS Time first assessed: 1653 -RS Present on Hospital Admission: N  -RS Location: abdomen  -RS Primary Wound Type: Puncture  -RS    Row Name 07/11/22 1824 07/11/22 1809 07/11/22 1751       Wound 07/11/22 1653 anterior abdomen Puncture    Wound - Properties Group Placement Date: 07/11/22  -RS Placement Time: 1653 -RS Present on Hospital Admission: N  -RS Orientation: anterior  -RS Location: abdomen  -RS Primary Wound Type: Puncture  -RS    Dressing Appearance dry;intact;no drainage;open to air  -KS dry;intact;no drainage;open to air  -KS --    Closure Approximated;Liquid skin adhesive  -KS Approximated;Liquid skin adhesive  -KS Sutures  -RS    Dressing Care -- -- other (see comments)  Sites x 4, exofin  -RS    Retired Wound - Properties Group Placement Date: 07/11/22  -RS Placement Time: 1653 -RS Present on Hospital Admission: N  -RS Orientation: anterior  -RS Location: abdomen  -RS Primary Wound Type: Puncture  -RS    Retired Wound - Properties Group Date first assessed: 07/11/22  -RS Time first assessed: 1653 -RS Present on Hospital Admission: N  -RS Location: abdomen  -RS Primary Wound Type: Puncture  -RS          User Key  (r) = Recorded By, (t) = Taken By, (c) = Cosigned By    Initials Name Provider Type    Marilee Reeder, RN Registered Nurse    Kalee Quarles LPN  Licensed Nurse    Darin Pérez RN Registered Nurse    Lynette Fragoso RN Registered Nurse    Davey Jiménez RN Registered Nurse                  Assessment & Plan      Brief Patient Summary:  Eleanor Bonds is a 46 y.o. female who presents with acute cholecystitis      acetaminophen, 1,000 mg, Oral, Q8H  amLODIPine, 10 mg, Oral, Q24H  sodium chloride, 10 mL, Intravenous, Q12H  sodium chloride, 10 mL, Intravenous, Q12H       lactated ringers, 100 mL/hr, Last Rate: 100 mL/hr (07/11/22 1769)  Pharmacy to Dose Zosyn,   Pharmacy to Dose Zosyn,          Active Hospital Problems:  Active Hospital Problems    Diagnosis    • **Cholecystitis      Plan:     Acute cholecystitis  GERD  Chest pain possible related to gallbladder disease/acid reflux  Negative initial troponin and EKG   Continue Zosyn  General surgery consulted  Keep n.p.o.  RUQ ordered and pending  PPI  Supportive care  Avoid NSAIDs.  Consider follow-up with GI as she may have additional pathology including gastritis/ulceration.  Keep on a PPI for now.           History of cervical cancer- s/p hysterectomy.     Leukocytosis without fever likely due to cholecystitis  No other signs of sepsis  Increasing white count today  Consider continuation of Zosyn if deemed necessary by surgery  Possibly related to stress of surgery    Hypertension not on any home medication might be related to severity of pain  Continues to be elevated significantly  Likely due to medications  Will start Norvasc 10 mg and as needed hydralazine for now    ?  Fasting hyperglycemia 144  Check A1c level  Accu-Cheks AC at bedtime     DVT prophylaxis with SCD given need for surgery     DVT prophylaxis:  Mechanical DVT prophylaxis orders are present.       DVT prophylaxis:  Mechanical DVT prophylaxis orders are present.    CODE STATUS:    Level Of Support Discussed With: Patient  Code Status (Patient has no pulse and is not breathing): CPR (Attempt to  Resuscitate)  Medical Interventions (Patient has pulse or is breathing): Full Support      Disposition:  I expect patient to be discharged home.    This patient has been examined wearing appropriate Personal Protective Equipment and discussed with hospital infection control department. 07/12/22      Electronically signed by Missael Card MD, 07/12/22, 12:18 EDT.  [facility], [place], operated by [facility] Hospitalist Team

## 2022-07-12 NOTE — CASE MANAGEMENT/SOCIAL WORK
Continued Stay Note   [facility]     Patient Name: Eleanor Bonds  MRN: [id number]  Today's Date: 7/12/2022    Admit Date: 7/11/2022     Discharge Plan     Row Name 07/12/22 1538       Plan    Plan Home with family.    Plan Comments Home with family.  Family can transport at discharge.             Mita Joseph RN   Phone communication or documentation only - no physical contact with patient or family.

## 2022-07-12 NOTE — PLAN OF CARE
Goal Outcome Evaluation:  Plan of Care Reviewed With: patient         Patient alert and oriented times four. Up in chair. Ambulated independently in hallway several times around the unit. Tolerated well. No SOA nor distress noted during ambulation. Pain is controlled by PRN medication. On regular diet. Consumes % of meals. No Nausea/vomiting noted. Continue plan of care.

## 2022-07-12 NOTE — PLAN OF CARE
Goal Outcome Evaluation:  Plan of Care Reviewed With: patient           Outcome Evaluation: Patient is a 46 year old s/p lap cholecystecomy with robot. Patient had c/o discomfort/pain with pain meds effective. Patient had c/o nausea/vomiting with medications effective. Call light and personal items in reach. Plan of care ongoing.

## 2022-07-13 ENCOUNTER — READMISSION MANAGEMENT (OUTPATIENT)
Dept: CALL CENTER | Facility: HOSPITAL | Age: 47
End: 2022-07-13

## 2022-07-13 VITALS
RESPIRATION RATE: 18 BRPM | SYSTOLIC BLOOD PRESSURE: 154 MMHG | HEIGHT: 63 IN | TEMPERATURE: 98.3 F | HEART RATE: 64 BPM | WEIGHT: 166.67 LBS | DIASTOLIC BLOOD PRESSURE: 82 MMHG | BODY MASS INDEX: 29.53 KG/M2 | OXYGEN SATURATION: 97 %

## 2022-07-13 LAB
ALBUMIN SERPL-MCNC: 4.1 G/DL (ref 3.5–5.2)
ALBUMIN/GLOB SERPL: 1.6 G/DL
ALP SERPL-CCNC: 74 U/L (ref 39–117)
ALT SERPL W P-5'-P-CCNC: 25 U/L (ref 1–33)
ANION GAP SERPL CALCULATED.3IONS-SCNC: 9 MMOL/L (ref 5–15)
AST SERPL-CCNC: 24 U/L (ref 1–32)
BILIRUB SERPL-MCNC: 0.7 MG/DL (ref 0–1.2)
BUN SERPL-MCNC: 3 MG/DL (ref 6–20)
BUN/CREAT SERPL: 5.2 (ref 7–25)
CALCIUM SPEC-SCNC: 8.5 MG/DL (ref 8.6–10.5)
CHLORIDE SERPL-SCNC: 104 MMOL/L (ref 98–107)
CO2 SERPL-SCNC: 26 MMOL/L (ref 22–29)
CREAT SERPL-MCNC: 0.58 MG/DL (ref 0.57–1)
DEPRECATED RDW RBC AUTO: 42.9 FL (ref 37–54)
EGFRCR SERPLBLD CKD-EPI 2021: 113.2 ML/MIN/1.73
EOSINOPHIL # BLD MANUAL: 0.36 10*3/MM3 (ref 0–0.4)
EOSINOPHIL NFR BLD MANUAL: 2 % (ref 0.3–6.2)
ERYTHROCYTE [DISTWIDTH] IN BLOOD BY AUTOMATED COUNT: 14.5 % (ref 12.3–15.4)
GLOBULIN UR ELPH-MCNC: 2.6 GM/DL
GLUCOSE BLDC GLUCOMTR-MCNC: 111 MG/DL (ref 70–105)
GLUCOSE BLDC GLUCOMTR-MCNC: 155 MG/DL (ref 70–105)
GLUCOSE SERPL-MCNC: 119 MG/DL (ref 65–99)
HCT VFR BLD AUTO: 40.6 % (ref 34–46.6)
HGB BLD-MCNC: 13.1 G/DL (ref 12–15.9)
LAB AP CASE REPORT: NORMAL
LIPASE SERPL-CCNC: 21 U/L (ref 13–60)
LYMPHOCYTES # BLD MANUAL: 3.09 10*3/MM3 (ref 0.7–3.1)
LYMPHOCYTES NFR BLD MANUAL: 1 % (ref 5–12)
MCH RBC QN AUTO: 27.3 PG (ref 26.6–33)
MCHC RBC AUTO-ENTMCNC: 32.2 G/DL (ref 31.5–35.7)
MCV RBC AUTO: 84.9 FL (ref 79–97)
MONOCYTES # BLD: 0.18 10*3/MM3 (ref 0.1–0.9)
NEUTROPHILS # BLD AUTO: 14.56 10*3/MM3 (ref 1.7–7)
NEUTROPHILS NFR BLD MANUAL: 80 % (ref 42.7–76)
PATH REPORT.FINAL DX SPEC: NORMAL
PATH REPORT.GROSS SPEC: NORMAL
PLAT MORPH BLD: NORMAL
PLATELET # BLD AUTO: 315 10*3/MM3 (ref 140–450)
PMV BLD AUTO: 8.9 FL (ref 6–12)
POTASSIUM SERPL-SCNC: 4.2 MMOL/L (ref 3.5–5.2)
PROT SERPL-MCNC: 6.7 G/DL (ref 6–8.5)
RBC # BLD AUTO: 4.78 10*6/MM3 (ref 3.77–5.28)
RBC MORPH BLD: NORMAL
SCAN SLIDE: NORMAL
SODIUM SERPL-SCNC: 139 MMOL/L (ref 136–145)
TOXIC GRANULATION: ABNORMAL
VARIANT LYMPHS NFR BLD MANUAL: 17 % (ref 19.6–45.3)
WBC NRBC COR # BLD: 18.2 10*3/MM3 (ref 3.4–10.8)

## 2022-07-13 PROCEDURE — 80053 COMPREHEN METABOLIC PANEL: CPT | Performed by: STUDENT IN AN ORGANIZED HEALTH CARE EDUCATION/TRAINING PROGRAM

## 2022-07-13 PROCEDURE — 25010000002 PIPERACILLIN SOD-TAZOBACTAM PER 1 G: Performed by: INTERNAL MEDICINE

## 2022-07-13 PROCEDURE — 85025 COMPLETE CBC W/AUTO DIFF WBC: CPT | Performed by: STUDENT IN AN ORGANIZED HEALTH CARE EDUCATION/TRAINING PROGRAM

## 2022-07-13 PROCEDURE — G0378 HOSPITAL OBSERVATION PER HR: HCPCS

## 2022-07-13 PROCEDURE — 99217 PR OBSERVATION CARE DISCHARGE MANAGEMENT: CPT | Performed by: INTERNAL MEDICINE

## 2022-07-13 PROCEDURE — 99024 POSTOP FOLLOW-UP VISIT: CPT | Performed by: STUDENT IN AN ORGANIZED HEALTH CARE EDUCATION/TRAINING PROGRAM

## 2022-07-13 PROCEDURE — 83690 ASSAY OF LIPASE: CPT | Performed by: STUDENT IN AN ORGANIZED HEALTH CARE EDUCATION/TRAINING PROGRAM

## 2022-07-13 PROCEDURE — 85007 BL SMEAR W/DIFF WBC COUNT: CPT | Performed by: STUDENT IN AN ORGANIZED HEALTH CARE EDUCATION/TRAINING PROGRAM

## 2022-07-13 PROCEDURE — 36415 COLL VENOUS BLD VENIPUNCTURE: CPT | Performed by: STUDENT IN AN ORGANIZED HEALTH CARE EDUCATION/TRAINING PROGRAM

## 2022-07-13 PROCEDURE — 82962 GLUCOSE BLOOD TEST: CPT

## 2022-07-13 RX ORDER — HYDROCODONE BITARTRATE AND ACETAMINOPHEN 5; 325 MG/1; MG/1
1 TABLET ORAL EVERY 6 HOURS PRN
Qty: 20 TABLET | Refills: 0 | Status: SHIPPED | OUTPATIENT
Start: 2022-07-13 | End: 2022-07-28

## 2022-07-13 RX ORDER — ONDANSETRON 4 MG/1
4 TABLET, FILM COATED ORAL EVERY 8 HOURS PRN
Qty: 30 TABLET | Refills: 1 | Status: SHIPPED | OUTPATIENT
Start: 2022-07-13 | End: 2022-07-28

## 2022-07-13 RX ORDER — LOSARTAN POTASSIUM 25 MG/1
25 TABLET ORAL DAILY
Qty: 30 TABLET | Refills: 0 | Status: SHIPPED | OUTPATIENT
Start: 2022-07-13 | End: 2022-08-17 | Stop reason: SDUPTHER

## 2022-07-13 RX ORDER — POLYETHYLENE GLYCOL 3350 17 G/17G
17 POWDER, FOR SOLUTION ORAL DAILY
Qty: 14 EACH | Refills: 0 | Status: SHIPPED | OUTPATIENT
Start: 2022-07-13 | End: 2022-07-28

## 2022-07-13 RX ORDER — AMLODIPINE BESYLATE 10 MG/1
10 TABLET ORAL
Qty: 30 TABLET | Refills: 0 | Status: SHIPPED | OUTPATIENT
Start: 2022-07-14 | End: 2022-07-20

## 2022-07-13 RX ADMIN — ACETAMINOPHEN 1000 MG: 500 TABLET ORAL at 08:44

## 2022-07-13 RX ADMIN — AMLODIPINE BESYLATE 10 MG: 5 TABLET ORAL at 08:44

## 2022-07-13 RX ADMIN — PIPERACILLIN AND TAZOBACTAM 3.38 G: 3; .375 INJECTION, POWDER, LYOPHILIZED, FOR SOLUTION INTRAVENOUS at 06:09

## 2022-07-13 NOTE — PROGRESS NOTES
General Surgery Progress Note    Name: Eleanor Bonds ADMIT: 2022   : 1975  PCP: Lizz Moraes APRN    MRN: [id number] LOS: 0 days   AGE/SEX: 46 y.o. female  ROOM: [address] [place] [address]    Chief Complaint   Patient presents with   • Chest Pain     Chest pain, vomiting, since 1230 am So[facility]       Subjective     Feels even better today, no significant pain, ambulating, passing flatus, tolerating diet    Objective     Scheduled Medications:   acetaminophen, 1,000 mg, Oral, Q8H  amLODIPine, 10 mg, Oral, Q24H  piperacillin-tazobactam, 3.375 g, Intravenous, Q8H  sodium chloride, 10 mL, Intravenous, Q12H  sodium chloride, 10 mL, Intravenous, Q12H        Active Infusions:  lactated ringers, 100 mL/hr, Last Rate: 100 mL/hr (22 1548)  Pharmacy to Dose Zosyn,         As Needed Medications:  hydrALAZINE  •  HYDROmorphone  •  melatonin  •  ondansetron **OR** ondansetron  •  ondansetron **OR** ondansetron  •  oxyCODONE  •  Pharmacy to Dose Zosyn  •  prochlorperazine **OR** prochlorperazine **OR** prochlorperazine  •  [COMPLETED] Insert peripheral IV **AND** sodium chloride  •  sodium chloride  •  sodium chloride    Vital Signs  Vital Signs Patient Vitals for the past 24 hrs:   BP Temp Temp src Pulse Resp SpO2   22 1059 154/82 98.3 °F (36.8 °C) Oral 64 18 97 %   22 0500 126/78 98.2 °F (36.8 °C) Oral 81 20 99 %   22 0100 143/80 98.3 °F (36.8 °C) Oral 65 20 98 %   22 2100 148/85 98.6 °F (37 °C) Oral 66 20 98 %   22 1659 135/80 98.7 °F (37.1 °C) Oral 70 18 99 %     I/O:  I/O last 3 completed shifts:  In: 840 [P.O.:840]  Out: 2300 [Urine:2300]    Physical Exam:  Physical Exam  Constitutional:       General: She is not in acute distress.     Appearance: Normal appearance. She is not ill-appearing.   HENT:      Head: Normocephalic and atraumatic.      Right Ear: External ear normal.      Left Ear: External ear normal.   Eyes:      Extraocular Movements: Extraocular movements intact.       Conjunctiva/sclera: Conjunctivae normal.   Cardiovascular:      Rate and Rhythm: Normal rate and regular rhythm.   Pulmonary:      Effort: Pulmonary effort is normal. No respiratory distress.   Abdominal:      General: There is no distension.      Palpations: Abdomen is soft.      Tenderness: There is no abdominal tenderness.   Musculoskeletal:         General: No swelling or deformity.   Skin:     General: Skin is warm and dry.   Neurological:      Mental Status: She is alert and oriented to person, place, and time. Mental status is at baseline.         Results Review:     CBC    Results from last 7 days   Lab Units 07/13/22  0309 07/12/22  0258 07/11/22  0856 07/11/22  0333   WBC 10*3/mm3 18.20* 26.00* 17.80* 15.90*   HEMOGLOBIN g/dL 13.1 14.0 12.9 13.8   PLATELETS 10*3/mm3 315 328 329 352     BMP   Results from last 7 days   Lab Units 07/13/22  0309 07/12/22  0258 07/11/22  0333   SODIUM mmol/L 139 137 140   POTASSIUM mmol/L 4.2 4.0 3.8   CHLORIDE mmol/L 104 102 101   CO2 mmol/L 26.0 24.0 27.0   BUN mg/dL 3* 6 11   CREATININE mg/dL 0.58 0.59 0.71   GLUCOSE mg/dL 119* 144* 125*     Radiology(recent) No radiology results for the last day    I reviewed the patient's new clinical results.    Assessment & Plan       Cholecystitis      46 y.o. female postop day 2 from robotic cholecystectomy for cholecystitis.  Doing well, white blood cell count trending down.  Okay for discharge, does not need continued antibiotics from cholecystitis standpoint.  No lifting over 10 pounds for 2 weeks.  Can shower today.  I will prescribe Norco, Zofran and MiraLAX for discharge.  Follow-up in my office in 2 weeks.        This note was created using Dragon Voice Recognition software.    Clarence Shanks MD  07/13/22  12:13 EDT

## 2022-07-13 NOTE — PROGRESS NOTES
General Surgery Progress Note    Name: Eleanor Bonds ADMIT: 2022   : 1975  PCP: Lizz Moraes APRN    MRN: [id number] LOS: 0 days   AGE/SEX: 46 y.o. female  ROOM: [address] [place] [address]    Chief Complaint   Patient presents with   • Chest Pain     Chest pain, vomiting, since 1230 am [facility]       Subjective     Still having some mild right upper quadrant pain, no nausea or vomiting, ambulating, no flatus or BM yet    Objective     Scheduled Medications:   acetaminophen, 1,000 mg, Oral, Q8H  amLODIPine, 10 mg, Oral, Q24H  piperacillin-tazobactam, 3.375 g, Intravenous, Q8H  sodium chloride, 10 mL, Intravenous, Q12H  sodium chloride, 10 mL, Intravenous, Q12H        Active Infusions:  lactated ringers, 100 mL/hr, Last Rate: 100 mL/hr (22 1548)  Pharmacy to Dose Zosyn,         As Needed Medications:  hydrALAZINE  •  HYDROmorphone  •  melatonin  •  ondansetron **OR** ondansetron  •  ondansetron **OR** ondansetron  •  oxyCODONE  •  Pharmacy to Dose Zosyn  •  prochlorperazine **OR** prochlorperazine **OR** prochlorperazine  •  [COMPLETED] Insert peripheral IV **AND** sodium chloride  •  sodium chloride  •  sodium chloride    Vital Signs  Vital Signs Patient Vitals for the past 24 hrs:   BP Temp Temp src Pulse Resp SpO2   22 0500 126/78 98.2 °F (36.8 °C) Oral 81 20 99 %   22 0100 143/80 98.3 °F (36.8 °C) Oral 65 20 98 %   22 2100 148/85 98.6 °F (37 °C) Oral 66 20 98 %   22 1659 135/80 98.7 °F (37.1 °C) Oral 70 18 99 %   22 1200 158/81 99 °F (37.2 °C) Oral 59 18 99 %   22 0900 (!) 187/91 98.8 °F (37.1 °C) Oral 55 18 100 %     I/O:  I/O last 3 completed shifts:  In: 840 [P.O.:840]  Out: 2300 [Urine:2300]    Physical Exam:  Physical Exam  Constitutional:       General: She is not in acute distress.     Appearance: Normal appearance. She is not ill-appearing.   HENT:      Head: Normocephalic and atraumatic.      Right Ear: External ear normal.      Left Ear: External ear  normal.   Eyes:      Extraocular Movements: Extraocular movements intact.      Conjunctiva/sclera: Conjunctivae normal.   Cardiovascular:      Rate and Rhythm: Normal rate and regular rhythm.   Pulmonary:      Effort: Pulmonary effort is normal. No respiratory distress.   Abdominal:      General: There is no distension.      Palpations: Abdomen is soft.      Comments: Incisions clean dry and intact   Musculoskeletal:         General: No swelling or deformity.   Skin:     General: Skin is warm and dry.   Neurological:      Mental Status: She is alert and oriented to person, place, and time. Mental status is at baseline.         Results Review:     CBC    Results from last 7 days   Lab Units 07/13/22  0309 07/12/22  0258 07/11/22  0856 07/11/22  0333   WBC 10*3/mm3 18.20* 26.00* 17.80* 15.90*   HEMOGLOBIN g/dL 13.1 14.0 12.9 13.8   PLATELETS 10*3/mm3 315 328 329 352     BMP   Results from last 7 days   Lab Units 07/13/22  0309 07/12/22 0258 07/11/22  0333   SODIUM mmol/L 139 137 140   POTASSIUM mmol/L 4.2 4.0 3.8   CHLORIDE mmol/L 104 102 101   CO2 mmol/L 26.0 24.0 27.0   BUN mg/dL 3* 6 11   CREATININE mg/dL 0.58 0.59 0.71   GLUCOSE mg/dL 119* 144* 125*     Radiology(recent) US Abdomen Limited    Result Date: 7/11/2022  Cholelithiasis with sonographic findings compatible with acute cholecystitis    Electronically Signed By-Hiren Bailey On:7/11/2022 10:06 AM This report was finalized on [id number] by  Hiren Bailey, .      I reviewed the patient's new clinical results.    Assessment & Plan       Cholecystitis      46 y.o. female postop day 1 from robotic cholecystectomy for cholecystitis.  White blood cell count still significantly elevated today, likely reactive but recommend continue antibiotics today.  Discussed option with patient for discharge with close follow-up versus staying 1 more day to make sure her white blood cell count is trending down and she would rather to stay overnight.  If her white  blood cell count is trending down tomorrow I am okay with discharge.        This note was created using Dragon Voice Recognition software.    Clarence Shanks MD  07/13/22  07:36 EDT

## 2022-07-13 NOTE — DISCHARGE SUMMARY
[facility] Medicine Services  DISCHARGE SUMMARY    Patient Name: Eleanor Bonds  : 1975  MRN: [id number]    Date of Admission: 2022  Discharge Diagnosis: Acute cholecystitis   Discharge date 2020  Condition stable  Primary Care Physician: Lizz Moraes APRN      Presenting Problem:   Cholecystitis [K81.9]  Upper abdominal pain [R10.10]  Chest pain, unspecified type [R07.9]  Nausea and vomiting, unspecified vomiting type [R11.2]    Active and Resolved Hospital Problems:  Active Hospital Problems    Diagnosis POA   • **Cholecystitis [K81.9] Yes      Resolved Hospital Problems   No resolved problems to display.         Hospital Course     Hospital Course:  Eleanor Bonds is a 46 y.o. female admitted for cholecystitis  She underwent cholecystectomy without complications  She had white count elevation that has been chronic and needs follow-up with hematologist.  She had history of DVT left lower extremity that has been off of anticoagulation for several years now.  She has been following with cancer center before and advised to follow-up within 2 weeks  Pathology is pending for cholecystectomy  She reports history of hysterectomy that showed early stage cervical cancer and she has been following regularly with her physician  She had uncontrolled hypertension started hypertensive medications with Norvasc and losartan.  For moderate to severe hypertension  A1c back in normal    DISCHARGE Follow Up Recommendations for labs and diagnostics: *  Follow-up on pathology report  Check CBC BMP in 1 week  Follow-up with hematologist 2 to 4 weeks for persistent leukocytosis        Reasons For Change In Medications and Indications for New Medications:      Day of Discharge     Vital Signs:  Temp:  [98.2 °F (36.8 °C)-98.7 °F (37.1 °C)] 98.3 °F (36.8 °C)  Heart Rate:  [64-81] 64  Resp:  [18-20] 18  BP: (126-154)/(78-85) 154/82    Physical Exam:  Physical Exam *trace lower  extremity edema.  Mild varicose left lower extremity        Pertinent  and/or Most Recent Results     LAB RESULTS:      Lab 07/13/22  0309 07/12/22  0258 07/11/22  0856 07/11/22  0333   WBC 18.20* 26.00* 17.80* 15.90*   HEMOGLOBIN 13.1 14.0 12.9 13.8   HEMATOCRIT 40.6 41.9 39.4 41.7   PLATELETS 315 328 329 352   NEUTROS ABS 14.56* 24.10* 15.10* 11.10*   LYMPHS ABS  --  1.10 2.00 3.40*   MONOS ABS  --  0.60 0.50 0.90   EOS ABS 0.36 0.00 0.00 0.40   MCV 84.9 84.4 84.9 84.4   PROTIME  --   --   --  10.0   APTT  --   --   --  29.7         Lab 07/13/22  0309 07/12/22  1706 07/12/22  0258 07/11/22  0333   SODIUM 139  --  137 140   POTASSIUM 4.2  --  4.0 3.8   CHLORIDE 104  --  102 101   CO2 26.0  --  24.0 27.0   ANION GAP 9.0  --  11.0 12.0   BUN 3*  --  6 11   CREATININE 0.58  --  0.59 0.71   EGFR 113.2  --  112.7 106.3   GLUCOSE 119*  --  144* 125*   CALCIUM 8.5*  --  8.3* 9.1   HEMOGLOBIN A1C  --  5.3  --   --          Lab 07/13/22 0309 07/12/22  0258 07/11/22  0333   TOTAL PROTEIN 6.7 6.6 6.9   ALBUMIN 4.10 4.00 4.40   GLOBULIN 2.6 2.6 2.5   ALT (SGPT) 25 21 12   AST (SGOT) 24 27 13   BILIRUBIN 0.7 0.5 0.3   ALK PHOS 74 80 79   LIPASE 21 28 52         Lab 07/11/22  0333   PROBNP 154.8   TROPONIN T <0.010   PROTIME 10.0   INR 0.97         Lab 07/11/22  0333   CHOLESTEROL 187   LDL CHOL 113*   HDL CHOL 46   TRIGLYCERIDES 157*             Brief Urine Lab Results  (Last result in the past 365 days)      Color   Clarity   Blood   Leuk Est   Nitrite   Protein   CREAT   Urine HCG        07/11/22 0632 Yellow   Clear   Negative   Negative   Negative   Negative               Microbiology Results (last 10 days)     Procedure Component Value - Date/Time    COVID PRE-OP / PRE-PROCEDURE SCREENING ORDER (NO ISOLATION) - Swab, Nasopharynx [[id number]]  (Normal) Collected: 07/11/22 0633    Lab Status: Final result Specimen: Swab from Nasopharynx Updated: 07/11/22 0703    Narrative:      The following orders were created for panel  order COVID PRE-OP / PRE-PROCEDURE SCREENING ORDER (NO ISOLATION) - Swab, Nasopharynx.  Procedure                               Abnormality         Status                     ---------                               -----------         ------                     COVID-19,CEPHEID/ANGEL,CO...[[id number]]  Normal              Final result                 Please view results for these tests on the individual orders.    COVID-19,CEPHEID/ANGEL,COR/ZHEN/PAD/BRAULIO IN-HOUSE(OR EMERGENT/ADD-ON),NP SWAB IN TRANSPORT MEDIA 3-4 HR TAT, RT-PCR - Swab, Nasopharynx [[id number]]  (Normal) Collected: 07/11/22 0633    Lab Status: Final result Specimen: Swab from Nasopharynx Updated: 07/11/22 0703     COVID19 Not Detected    Narrative:      Fact sheet for providers: https://www.fda.gov/media/950935/download     Fact sheet for patients: https://www.fda.gov/media/258297/download  Fact sheet for providers: https://www.fda.gov/media/396393/download    Fact sheet for patients: https://www.fda.gov/media/033456/download    Test performed by PCR.          CT Abdomen Pelvis With Contrast    Result Date: 7/11/2022  Impression: Impression: 1. Gallbladder wall thickening and pericholecystic edema. Right upper quadrant ultrasound should be considered for further evaluation of acute cholecystitis if clinical warranted. 2. Urinary bladder wall thickening is nonspecific in underdistention. Correlate for UTI or cystitis. Electronically signed by:  Juma Lockhart M.D.  7/11/2022 3:01 AM    XR Chest 1 View    Result Date: 7/11/2022  Impression: IMPRESSION : No acute process.[  Electronically Signed By-Hiren Bailey On:7/11/2022 7:47 AM This report was finalized on [id number] by  Hiren Bailey, .    US Abdomen Limited    Result Date: 7/11/2022  Impression: Cholelithiasis with sonographic findings compatible with acute cholecystitis    Electronically Signed By-Hiren Bailey On:7/11/2022 10:06 AM This report was finalized on [id number] by  Hiren  Leo, .      Results for orders placed in visit on 05/28/21    Duplex Venous Lower Extremity - Left CAR    Interpretation Summary  · Chronic left lower extremity deep vein thrombosis noted in the common femoral, proximal femoral and popliteal.  · All other left sided veins appeared normal.      Results for orders placed in visit on 05/28/21    Duplex Venous Lower Extremity - Left CAR    Interpretation Summary  · Chronic left lower extremity deep vein thrombosis noted in the common femoral, proximal femoral and popliteal.  · All other left sided veins appeared normal.      Results for orders placed during the hospital encounter of 09/04/19    Adult Transthoracic Echo Complete W/ Cont if Necessary Per Protocol    Interpretation Summary  Indications  Chest pain    Technically satisfactory study.  Mitral valve is structurally normal.  Tricuspid valve is structurally normal.  Aortic valve is structurally normal.  Pulmonic valve could not be well visualized.  No evidence for mitral tricuspid or aortic regurgitation is seen by Doppler study.  Left atrium is normal in size.  Right atrium is normal in size.  Left ventricle is normal in size and contractility with ejection fraction of 60%.  Right ventricle is normal in size.  Atrial septum is intact.  Aorta is normal.  No pericardial effusion or intracardiac thrombus is seen.    Impression  Structurally and functionally normal cardiac valves.  Normal left ventricular size and contractility.  Normal echo Doppler study.  Left ventricular ejection fraction is 60%.      Labs Pending at Discharge:  Pending Labs     Order Current Status    Tissue Pathology Exam In process          Procedures Performed  Procedure(s):  CHOLECYSTECTOMY LAPAROSCOPIC WITH DAVINCI ROBOT         Consults:   Consults     Date and Time Order Name Status Description    7/11/2022  5:23 AM Hospitalist (on-call MD unless specified)      7/11/2022  5:22 AM IP Consult to General Surgery Completed              Discharge Details        Discharge Medications      New Medications      Instructions Start Date   amLODIPine 10 MG tablet  Commonly known as: NORVASC   10 mg, Oral, Every 24 Hours Scheduled   Start Date: July 14, 2022     losartan 25 MG tablet  Commonly known as: Cozaar   25 mg, Oral, Daily         Continue These Medications      Instructions Start Date   omeprazole 20 MG capsule  Commonly known as: priLOSEC   20 mg, Oral, Daily PRN         Stop These Medications    ibuprofen 200 MG tablet  Commonly known as: ADVIL,MOTRIN            Allergies   Allergen Reactions   • Aspirin GI Intolerance         Discharge Disposition:    Home or Self Care    Diet:  Hospital:  Diet Order   Procedures   • Diet Regular         Discharge Activity:         CODE STATUS:  Code Status and Medical Interventions:   Ordered at: 07/11/22 0844     Level Of Support Discussed With:    Patient     Code Status (Patient has no pulse and is not breathing):    CPR (Attempt to Resuscitate)     Medical Interventions (Patient has pulse or is breathing):    Full Support         Future Appointments   Date Time Provider Department Center   7/28/2022  9:15 AM Clarence Shanks MD [facility]           Time spent on Discharge including face to face service: 22minutes    This patient has been examined wearing appropriate Personal Protective Equipment and discussed with hospital infection control department. 07/13/22      Signature: Electronically signed by Missael Card MD, 07/13/22, 12:15 PM EDT.  [facility] Hospitalist Team

## 2022-07-13 NOTE — CASE MANAGEMENT/SOCIAL WORK
Case Management Discharge Note      Final Note: Home         Selected Continued Care - Discharged on 7/13/2022 Admission date: 7/11/2022 - Discharge disposition: Home or Self Care            Transportation Services  Private: Car    Final Discharge Disposition Code: 01 - home or self-care

## 2022-07-13 NOTE — PLAN OF CARE
Goal Outcome Evaluation:  Plan of Care Reviewed With: patient           Outcome Evaluation: Patient ambulated in hallway x 2 during shift. Patient c/o pain with report of pain meds effective. No c/o nausea during night. Call light and personal items in reach. Plan of care ongoing.

## 2022-07-13 NOTE — OUTREACH NOTE
Prep Survey    Flowsheet Row Responses   [facility] patient discharged from? [facility]   Is LACE score < 7 ? Yes   Emergency Room discharge w/ pulse ox? No   Eligibility [facility]   Date of Admission 07/11/22   Date of Discharge 07/13/22   Discharge Disposition Home or Self Care   Discharge diagnosis cholecystectomy without complications   Does the patient have one of the following disease processes/diagnoses(primary or secondary)? General Surgery   Does the patient have Home health ordered? No   Is there a DME ordered? No   Prep survey completed? Yes          SAWYER ACE - Registered Nurse

## 2022-07-13 NOTE — PLAN OF CARE
Goal Outcome Evaluation:              Outcome Evaluation: pt doing well with pain control and will discharge today

## 2022-07-14 ENCOUNTER — TRANSITIONAL CARE MANAGEMENT TELEPHONE ENCOUNTER (OUTPATIENT)
Dept: CALL CENTER | Facility: HOSPITAL | Age: 47
End: 2022-07-14

## 2022-07-14 LAB — QT INTERVAL: 448 MS

## 2022-07-14 NOTE — OUTREACH NOTE
Call Center TCM Note    Flowsheet Row Responses   [facility] patient discharged from? [facility]   Does the patient have one of the following disease processes/diagnoses(primary or secondary)? General Surgery   TCM attempt successful? No   Unsuccessful attempts Attempt 1  [Que on verbal release]          Lesley Flores RN    7/14/2022, 13:10 EDT

## 2022-07-14 NOTE — OUTREACH NOTE
Call Center TCM Note    Flowsheet Row Responses   [facility] patient discharged from? [facility]   Does the patient have one of the following disease processes/diagnoses(primary or secondary)? General Surgery   TCM attempt successful? Yes   Call start time 1458   Call end time 1507   Discharge diagnosis cholecystectomy without complications   Meds reviewed with patient/caregiver? Yes   Is the patient having any side effects they believe may be caused by any medication additions or changes? No   Does the patient have all medications related to this admission filled (includes all antibiotics, pain medications, etc.) Yes   Prescription comments Patient hesitant to start new BP meds d/t BP has returned back to normal- instructed pt to contact PCP    Is the patient taking all medications as directed (includes completed medication regime)? Yes   Does the patient have a follow up appointment scheduled with their surgeon? Yes  [7-28-22 surgeon fu ]   Has the patient kept scheduled appointments due by today? N/A   Comments PCP fu apt 7/20/22   Psychosocial issues? No   Did the patient receive a copy of their discharge instructions? Yes   Nursing interventions Reviewed instructions with patient   What is the patient's perception of their health status since discharge? Improving   Nursing interventions Nurse provided patient education   Is the patient /caregiver able to teach back basic post-op care? Keep incision areas clean,dry and protected, Take showers only when approved by MD-sponge bathe until then, Continue use of incentive spirometry at least 1 week post discharge   Is the patient/caregiver able to teach back signs and symptoms of incisional infection? Increased redness, swelling or pain at the incisonal site, Increased drainage or bleeding, Pus or odor from incision   Is the patient/caregiver able to teach back steps to recovery at home? Set small, achievable goals for return to baseline health, Rest and rebuild  strength, gradually increase activity   If the patient is a current smoker, are they able to teach back resources for cessation? Not a smoker   Is the patient/caregiver able to teach back the hierarchy of who to call/visit for symptoms/problems? PCP, Specialist, Home health nurse, Urgent Care, ED, 911 Yes   TCM call completed? Yes          Lesley Flores RN    7/14/2022, 15:08 EDT

## 2022-07-19 ENCOUNTER — TELEPHONE (OUTPATIENT)
Dept: SURGERY | Facility: CLINIC | Age: 47
End: 2022-07-19

## 2022-07-20 ENCOUNTER — OFFICE VISIT (OUTPATIENT)
Dept: FAMILY MEDICINE CLINIC | Facility: CLINIC | Age: 47
End: 2022-07-20

## 2022-07-20 VITALS
SYSTOLIC BLOOD PRESSURE: 139 MMHG | DIASTOLIC BLOOD PRESSURE: 90 MMHG | HEART RATE: 65 BPM | TEMPERATURE: 98 F | WEIGHT: 164 LBS | OXYGEN SATURATION: 100 % | BODY MASS INDEX: 29.05 KG/M2

## 2022-07-20 DIAGNOSIS — I10 HYPERTENSION, UNSPECIFIED TYPE: ICD-10-CM

## 2022-07-20 DIAGNOSIS — Z90.49 STATUS POST CHOLECYSTECTOMY: Primary | ICD-10-CM

## 2022-07-20 DIAGNOSIS — D72.829 LEUKOCYTOSIS, UNSPECIFIED TYPE: ICD-10-CM

## 2022-07-20 PROCEDURE — 99495 TRANSJ CARE MGMT MOD F2F 14D: CPT | Performed by: NURSE PRACTITIONER

## 2022-07-20 NOTE — PROGRESS NOTES
Transitional Care Follow Up Visit  Subjective     Eleanor Bonds is a 46 y.o. female who presents for a transitional care management visit.    Within 48 business hours after discharge our office contacted her via telephone to coordinate her care and needs.      I reviewed and discussed the details of that call along with the discharge summary, hospital problems, inpatient lab results, inpatient diagnostic studies, and consultation reports with Eleanor.     Current outpatient and discharge medications have been reconciled for the patient.  Reviewed by: HOLLEY Valdez      Date of TCM Phone Call 7/13/2022   [facility]   Date of Admission 7/11/2022   Date of Discharge 7/13/2022   Discharge Disposition Home or Self Care     Risk for Readmission (LACE) Score: 4 (7/13/2022  6:01 AM)      Patient is here today for [facility] admission follow-up  She was admitted from 7/11-7/13 for a cholecystectomy  Dr. Shanks did the procedure and she sees him next week  She went to the ER with chest pain  Pt states she is doing well at this time  Pain is minimal  Having bowel movements  No signs of infection  Denies nausea or vomiting  Pt has not been taking new BP meds    Hospital Course:  Eleanor Bonds is a 46 y.o. female admitted for cholecystitis  She underwent cholecystectomy without complications  She had white count elevation that has been chronic and needs follow-up with hematologist.  She had history of DVT left lower extremity that has been off of anticoagulation for several years now.  She has been following with cancer center before and advised to follow-up within 2 weeks  Pathology is pending for cholecystectomy  She reports history of hysterectomy that showed early stage cervical cancer and she has been following regularly with her physician  She had uncontrolled hypertension started hypertensive medications with Norvasc and losartan.  For moderate to severe hypertension  A1c back  in normal     DISCHARGE Follow Up Recommendations for labs and diagnostics: *  Follow-up on pathology report  Check CBC BMP in 1 week  Follow-up with hematologist 2 to 4 weeks for persistent leukocytosis     Course During Hospital Stay:  See HPI     The following portions of the patient's history were reviewed and updated as appropriate: allergies, current medications, past family history, past medical history, past social history, past surgical history and problem list.    Review of Systems   Constitutional: Negative for chills, fatigue and fever.   Respiratory: Negative for chest tightness and shortness of breath.    Cardiovascular: Negative for chest pain and palpitations.   Gastrointestinal: Negative for abdominal pain, constipation, nausea and vomiting.   Neurological: Negative for dizziness and headaches.       Objective   Physical Exam  Vitals reviewed.   Constitutional:       General: She is not in acute distress.     Appearance: Normal appearance. She is well-developed. She is not diaphoretic.   HENT:      Head: Normocephalic and atraumatic.   Eyes:      General:         Right eye: No discharge.         Left eye: No discharge.      Extraocular Movements: Extraocular movements intact.      Conjunctiva/sclera: Conjunctivae normal.   Cardiovascular:      Rate and Rhythm: Normal rate and regular rhythm.      Heart sounds: No murmur heard.  Pulmonary:      Effort: Pulmonary effort is normal. No respiratory distress.      Breath sounds: Normal breath sounds. No wheezing or rales.   Abdominal:      General: Bowel sounds are normal. There is no distension.      Palpations: Abdomen is soft.      Tenderness: There is no abdominal tenderness.   Musculoskeletal:         General: Normal range of motion.      Cervical back: Normal range of motion.   Skin:     General: Skin is warm and dry.      Comments: Incisions healing well   Neurological:      General: No focal deficit present.      Mental Status: She is alert and  oriented to person, place, and time.   Psychiatric:         Mood and Affect: Mood normal.         Behavior: Behavior normal.         Thought Content: Thought content normal.         Judgment: Judgment normal.         Assessment & Plan   Problems Addressed this Visit        Hematology and Neoplasia    Leukocytosis    Relevant Orders    CBC & Differential      Other Visit Diagnoses     Status post cholecystectomy    -  Primary    healing well  minimal pain  having BM  no s/s infection    Hypertension, unspecified type        continue losartan       Diagnoses       Codes Comments    Status post cholecystectomy    -  Primary ICD-10-CM: Z90.49  ICD-9-CM: V45.79 healing well  minimal pain  having BM  no s/s infection    Hypertension, unspecified type     ICD-10-CM: I10  ICD-9-CM: 401.9 continue losartan     Leukocytosis, unspecified type     ICD-10-CM: D72.829  ICD-9-CM: 288.60 recheck CBC  may need hematology

## 2022-07-28 ENCOUNTER — OFFICE VISIT (OUTPATIENT)
Dept: SURGERY | Facility: CLINIC | Age: 47
End: 2022-07-28

## 2022-07-28 ENCOUNTER — LAB (OUTPATIENT)
Dept: FAMILY MEDICINE CLINIC | Facility: CLINIC | Age: 47
End: 2022-07-28

## 2022-07-28 VITALS
HEART RATE: 74 BPM | WEIGHT: 163.2 LBS | RESPIRATION RATE: 18 BRPM | DIASTOLIC BLOOD PRESSURE: 81 MMHG | BODY MASS INDEX: 28.92 KG/M2 | HEIGHT: 63 IN | TEMPERATURE: 98.2 F | OXYGEN SATURATION: 98 % | SYSTOLIC BLOOD PRESSURE: 116 MMHG

## 2022-07-28 DIAGNOSIS — K81.9 CHOLECYSTITIS: Primary | ICD-10-CM

## 2022-07-28 DIAGNOSIS — D72.829 LEUKOCYTOSIS, UNSPECIFIED TYPE: ICD-10-CM

## 2022-07-28 LAB
BASOPHILS # BLD AUTO: 0.07 10*3/MM3 (ref 0–0.2)
BASOPHILS NFR BLD AUTO: 0.5 % (ref 0–1.5)
DEPRECATED RDW RBC AUTO: 40.4 FL (ref 37–54)
EOSINOPHIL # BLD AUTO: 0.34 10*3/MM3 (ref 0–0.4)
EOSINOPHIL NFR BLD AUTO: 2.5 % (ref 0.3–6.2)
ERYTHROCYTE [DISTWIDTH] IN BLOOD BY AUTOMATED COUNT: 12.8 % (ref 12.3–15.4)
HCT VFR BLD AUTO: 42.1 % (ref 34–46.6)
HGB BLD-MCNC: 13.8 G/DL (ref 12–15.9)
IMM GRANULOCYTES # BLD AUTO: 0.05 10*3/MM3 (ref 0–0.05)
IMM GRANULOCYTES NFR BLD AUTO: 0.4 % (ref 0–0.5)
LYMPHOCYTES # BLD AUTO: 3.43 10*3/MM3 (ref 0.7–3.1)
LYMPHOCYTES NFR BLD AUTO: 25.4 % (ref 19.6–45.3)
MCH RBC QN AUTO: 28.6 PG (ref 26.6–33)
MCHC RBC AUTO-ENTMCNC: 32.8 G/DL (ref 31.5–35.7)
MCV RBC AUTO: 87.2 FL (ref 79–97)
MONOCYTES # BLD AUTO: 0.71 10*3/MM3 (ref 0.1–0.9)
MONOCYTES NFR BLD AUTO: 5.2 % (ref 5–12)
NEUTROPHILS NFR BLD AUTO: 66 % (ref 42.7–76)
NEUTROPHILS NFR BLD AUTO: 8.93 10*3/MM3 (ref 1.7–7)
NRBC BLD AUTO-RTO: 0 /100 WBC (ref 0–0.2)
PLATELET # BLD AUTO: 478 10*3/MM3 (ref 140–450)
PMV BLD AUTO: 11 FL (ref 6–12)
RBC # BLD AUTO: 4.83 10*6/MM3 (ref 3.77–5.28)
WBC NRBC COR # BLD: 13.53 10*3/MM3 (ref 3.4–10.8)

## 2022-07-28 PROCEDURE — 85025 COMPLETE CBC W/AUTO DIFF WBC: CPT | Performed by: NURSE PRACTITIONER

## 2022-07-28 PROCEDURE — 99024 POSTOP FOLLOW-UP VISIT: CPT | Performed by: STUDENT IN AN ORGANIZED HEALTH CARE EDUCATION/TRAINING PROGRAM

## 2022-08-09 NOTE — PROGRESS NOTES
"Chief Complaint  Post-op Follow-up (Lap Pratima 7/11/22)    Subjective        Eleanor Bonds presents to [facility] GENERAL SURGERY  History of Present Illness    46-year-old lady here for follow-up after her robotic cholecystectomy.  She is doing very well, no issues.  We discussed her pathology results showing no malignancy.  No further restrictions, she can follow-up me again in the future if needed.    Objective   Vital Signs:  /81 (BP Location: Left arm, Patient Position: Sitting, Cuff Size: Adult)   Pulse 74   Temp 98.2 °F (36.8 °C) (Infrared)   Resp 18   Ht 160 cm (63\")   Wt 74 kg (163 lb 3.2 oz)   SpO2 98%   BMI 28.91 kg/m²   Estimated body mass index is 28.91 kg/m² as calculated from the following:    Height as of this encounter: 160 cm (63\").    Weight as of this encounter: 74 kg (163 lb 3.2 oz).          Physical Exam  Constitutional:       General: She is not in acute distress.     Appearance: Normal appearance. She is not ill-appearing.   HENT:      Head: Normocephalic and atraumatic.      Right Ear: External ear normal.      Left Ear: External ear normal.   Eyes:      Extraocular Movements: Extraocular movements intact.      Conjunctiva/sclera: Conjunctivae normal.   Cardiovascular:      Rate and Rhythm: Normal rate and regular rhythm.   Pulmonary:      Effort: Pulmonary effort is normal. No respiratory distress.   Abdominal:      General: There is no distension.      Palpations: Abdomen is soft.      Comments: Incisions clean dry and intact   Musculoskeletal:         General: No swelling or deformity.   Skin:     General: Skin is warm and dry.   Neurological:      Mental Status: She is alert and oriented to person, place, and time. Mental status is at baseline.        Result Review :                Assessment and Plan   Diagnoses and all orders for this visit:    1. Cholecystitis (Primary)    46-year-old lady here for follow-up after her robotic cholecystectomy.  She is " doing very well, no issues.  We discussed her pathology results showing no malignancy.  No further restrictions, she can follow-up me again in the future if needed.           Follow Up   No follow-ups on file.  Patient was given instructions and counseling regarding her condition or for health maintenance advice. Please see specific information pulled into the AVS if appropriate.

## 2022-08-17 RX ORDER — LOSARTAN POTASSIUM 25 MG/1
25 TABLET ORAL DAILY
Qty: 90 TABLET | Refills: 1 | Status: SHIPPED | OUTPATIENT
Start: 2022-08-17 | End: 2023-02-16

## 2023-01-05 RX ORDER — AMOXICILLIN AND CLAVULANATE POTASSIUM 875; 125 MG/1; MG/1
1 TABLET, FILM COATED ORAL 2 TIMES DAILY
Qty: 20 TABLET | Refills: 0 | Status: SHIPPED | OUTPATIENT
Start: 2023-01-05 | End: 2023-01-15

## 2023-01-23 ENCOUNTER — OFFICE VISIT (OUTPATIENT)
Dept: FAMILY MEDICINE CLINIC | Facility: CLINIC | Age: 48
End: 2023-01-23
Payer: COMMERCIAL

## 2023-01-23 VITALS
DIASTOLIC BLOOD PRESSURE: 88 MMHG | OXYGEN SATURATION: 99 % | SYSTOLIC BLOOD PRESSURE: 135 MMHG | WEIGHT: 178 LBS | HEART RATE: 64 BPM | BODY MASS INDEX: 31.53 KG/M2 | TEMPERATURE: 97.8 F

## 2023-01-23 DIAGNOSIS — I10 HYPERTENSION, UNSPECIFIED TYPE: Primary | ICD-10-CM

## 2023-01-23 DIAGNOSIS — Z12.31 BREAST CANCER SCREENING BY MAMMOGRAM: ICD-10-CM

## 2023-01-23 DIAGNOSIS — Z23 NEED FOR TDAP VACCINATION: ICD-10-CM

## 2023-01-23 DIAGNOSIS — Z12.11 COLON CANCER SCREENING: ICD-10-CM

## 2023-01-23 DIAGNOSIS — R22.0 MASS OF HEAD: ICD-10-CM

## 2023-01-23 PROCEDURE — 99214 OFFICE O/P EST MOD 30 MIN: CPT | Performed by: NURSE PRACTITIONER

## 2023-01-23 PROCEDURE — 90715 TDAP VACCINE 7 YRS/> IM: CPT | Performed by: NURSE PRACTITIONER

## 2023-01-23 PROCEDURE — 90471 IMMUNIZATION ADMIN: CPT | Performed by: NURSE PRACTITIONER

## 2023-01-23 NOTE — PROGRESS NOTES
Subjective     Eleanor Bonds is a 47 y.o. female.     History of Present Illness  Pt is here today for a 6 mo follow up on HTN.  She is currently on losartan 25mg daily.   She is doing well on the medication.   Denies CP, SOA, dizziness, HA.  Still having some left ear pain  Cannot take flonase due to nose bleeds  Pt was diagnosed with cervical cancer last year and sees Dr. Starkey.   She had a partial hysterectomy.   Pt has a history of a nodule on her right posterior head.  She states it was assessed years ago,  She recent 2 weeks ago developed one on the left posterior skull.  The nodule is not tender.  No recent fever or chills.       Labs- UTD  Pap smear-  Mammogram- due  Colonoscopy- due    Vaccines:  Flu- refused  PNA-  Shingles-  Tdap- due  Covid-19-    Dental exam-  Eye exam-         The following portions of the patient's history were reviewed and updated as appropriate: allergies, current medications, past family history, past medical history, past social history, past surgical history and problem list.    Review of Systems   Constitutional: Negative for chills, fatigue and fever.   HENT: Positive for ear pain.    Respiratory: Negative for chest tightness and shortness of breath.    Cardiovascular: Negative for chest pain and palpitations.   Gastrointestinal: Negative for abdominal pain, nausea and vomiting.   Skin:        Nodule head   Neurological: Positive for headache. Negative for dizziness.       Objective     /88 (BP Location: Left arm, Patient Position: Sitting, Cuff Size: Adult)   Pulse 64   Temp 97.8 °F (36.6 °C) (Tympanic)   Wt 80.7 kg (178 lb)   LMP 03/03/2020   SpO2 99%   BMI 31.53 kg/m²     Current Outpatient Medications on File Prior to Visit   Medication Sig Dispense Refill   • losartan (Cozaar) 25 MG tablet Take 1 tablet by mouth Daily for 30 days. 90 tablet 1   • omeprazole (priLOSEC) 20 MG capsule Take 20 mg by mouth Daily As Needed.       No current  facility-administered medications on file prior to visit.        Physical Exam  Constitutional:       Appearance: Normal appearance. She is not ill-appearing.   HENT:      Head: Normocephalic and atraumatic.      Left Ear: Tympanic membrane and ear canal normal.   Cardiovascular:      Rate and Rhythm: Normal rate and regular rhythm.      Heart sounds: No murmur heard.  Pulmonary:      Effort: Pulmonary effort is normal. No respiratory distress.      Breath sounds: Normal breath sounds.   Musculoskeletal:      Comments: Dime sized nodule on the posterior left skull   Skin:     General: Skin is warm and dry.   Neurological:      General: No focal deficit present.      Mental Status: She is alert and oriented to person, place, and time.   Psychiatric:         Mood and Affect: Mood normal.         Behavior: Behavior normal.         Thought Content: Thought content normal.         Judgment: Judgment normal.           Assessment & Plan     Diagnoses and all orders for this visit:    1. Hypertension, unspecified type (Primary)  Comments:  stable  cont losartan  work on diet and exercise  check labs at next visit    2. Mass of head  Comments:  cyst vs lymph node  get US for eval.    Orders:  -     US Head Neck Soft Tissue; Future    3. Colon cancer screening  -     Ambulatory Referral For Screening Colonoscopy    4. Breast cancer screening by mammogram  -     Mammo Screening Digital Tomosynthesis Bilateral With CAD; Future

## 2023-01-30 ENCOUNTER — HOSPITAL ENCOUNTER (OUTPATIENT)
Dept: MAMMOGRAPHY | Facility: HOSPITAL | Age: 48
Discharge: HOME | End: 2023-01-30
Payer: COMMERCIAL

## 2023-01-30 ENCOUNTER — HOSPITAL ENCOUNTER (OUTPATIENT)
Dept: ULTRASOUND IMAGING | Facility: HOSPITAL | Age: 48
Discharge: HOME | End: 2023-01-30
Payer: COMMERCIAL

## 2023-01-30 DIAGNOSIS — R22.0 MASS OF HEAD: ICD-10-CM

## 2023-01-30 DIAGNOSIS — Z12.31 BREAST CANCER SCREENING BY MAMMOGRAM: ICD-10-CM

## 2023-01-30 PROCEDURE — 76536 US EXAM OF HEAD AND NECK: CPT

## 2023-01-30 PROCEDURE — 77067 SCR MAMMO BI INCL CAD: CPT

## 2023-01-30 PROCEDURE — 77063 BREAST TOMOSYNTHESIS BI: CPT

## 2023-02-16 RX ORDER — LOSARTAN POTASSIUM 25 MG/1
TABLET ORAL
Qty: 90 TABLET | Refills: 1 | Status: SHIPPED | OUTPATIENT
Start: 2023-02-16

## 2023-04-12 ENCOUNTER — LAB (OUTPATIENT)
Dept: FAMILY MEDICINE CLINIC | Facility: CLINIC | Age: 48
End: 2023-04-12
Payer: COMMERCIAL

## 2023-04-12 ENCOUNTER — OFFICE VISIT (OUTPATIENT)
Dept: FAMILY MEDICINE CLINIC | Facility: CLINIC | Age: 48
End: 2023-04-12
Payer: COMMERCIAL

## 2023-04-12 VITALS
SYSTOLIC BLOOD PRESSURE: 138 MMHG | WEIGHT: 173 LBS | OXYGEN SATURATION: 99 % | BODY MASS INDEX: 30.65 KG/M2 | TEMPERATURE: 98 F | DIASTOLIC BLOOD PRESSURE: 87 MMHG | HEART RATE: 74 BPM

## 2023-04-12 DIAGNOSIS — N13.30 HYDRONEPHROSIS, UNSPECIFIED HYDRONEPHROSIS TYPE: ICD-10-CM

## 2023-04-12 DIAGNOSIS — I87.1 MAY-THURNER SYNDROME: Primary | ICD-10-CM

## 2023-04-12 LAB
ANION GAP SERPL CALCULATED.3IONS-SCNC: 9.5 MMOL/L (ref 5–15)
BUN SERPL-MCNC: 9 MG/DL (ref 6–20)
BUN/CREAT SERPL: 13.8 (ref 7–25)
CALCIUM SPEC-SCNC: 8.8 MG/DL (ref 8.6–10.5)
CHLORIDE SERPL-SCNC: 102 MMOL/L (ref 98–107)
CO2 SERPL-SCNC: 26.5 MMOL/L (ref 22–29)
CREAT SERPL-MCNC: 0.65 MG/DL (ref 0.57–1)
EGFRCR SERPLBLD CKD-EPI 2021: 109.4 ML/MIN/1.73
GLUCOSE SERPL-MCNC: 101 MG/DL (ref 65–99)
POTASSIUM SERPL-SCNC: 3.4 MMOL/L (ref 3.5–5.2)
SODIUM SERPL-SCNC: 138 MMOL/L (ref 136–145)

## 2023-04-12 PROCEDURE — 99213 OFFICE O/P EST LOW 20 MIN: CPT | Performed by: NURSE PRACTITIONER

## 2023-04-12 PROCEDURE — 36415 COLL VENOUS BLD VENIPUNCTURE: CPT

## 2023-04-12 PROCEDURE — 80048 BASIC METABOLIC PNL TOTAL CA: CPT | Performed by: NURSE PRACTITIONER

## 2023-04-12 NOTE — PROGRESS NOTES
Subjective     Eleanor Bonds is a 47 y.o. female.     History of Present Illness  Pt is here today to discuss CT results that she received from Dr. Starkey  Pt was diagnosed with cervical cancer last year and sees Dr. Starkey.   She had a partial hysterectomy.   She has chronic back pain and has had injections.  On her CT scan it was noted that she had bilateral pubic varices that could be related to May Thurner Syndrom.  She does have a history about 20 yrs ago of a DVT in the left leg  Occasionally has swelling in her legs.  She has a recent history of kidney stones  CT showed some mild hydronephrosis  She will follow up with Dr. Campos on her chronic back pain.        The following portions of the patient's history were reviewed and updated as appropriate: allergies, current medications, past family history, past medical history, past social history, past surgical history and problem list.    Review of Systems   Constitutional: Negative for chills, fatigue and fever.   Respiratory: Negative for chest tightness and shortness of breath.    Cardiovascular: Positive for leg swelling (occasional). Negative for chest pain and palpitations.   Gastrointestinal: Negative for abdominal pain, nausea and vomiting.   Genitourinary: Negative for frequency and urgency.   Musculoskeletal: Positive for back pain.   Neurological: Negative for dizziness, headache and confusion.       Objective     /87   Pulse 74   Temp 98 °F (36.7 °C) (Tympanic)   Wt 78.5 kg (173 lb)   LMP 03/03/2020   SpO2 99%   BMI 30.65 kg/m²     Current Outpatient Medications on File Prior to Visit   Medication Sig Dispense Refill   • losartan (COZAAR) 25 MG tablet TAKE 1 TABLET BY MOUTH DAILY 90 tablet 1   • omeprazole (priLOSEC) 20 MG capsule Take 20 mg by mouth Daily As Needed.       No current facility-administered medications on file prior to visit.        Physical Exam  Constitutional:       Appearance: Normal appearance. She is not  ill-appearing.   HENT:      Head: Normocephalic and atraumatic.   Cardiovascular:      Rate and Rhythm: Normal rate and regular rhythm.      Heart sounds: No murmur heard.  Musculoskeletal:         General: Normal range of motion.   Skin:     General: Skin is warm and dry.   Neurological:      General: No focal deficit present.      Mental Status: She is alert and oriented to person, place, and time.   Psychiatric:         Mood and Affect: Mood normal.         Behavior: Behavior normal.         Thought Content: Thought content normal.         Judgment: Judgment normal.           Assessment & Plan     Diagnoses and all orders for this visit:    1. May-Thurner syndrome (Primary)  Comments:  possibly- concerning on CT scan  does have Hx of DVT  has chronic back pain  will send to vascular for further eval  Orders:  -     Ambulatory Referral to Vascular Surgery    2. Hydronephrosis, unspecified hydronephrosis type  Comments:  mild on CT scan  likely due to previous stones  sees urology  will check kidney function  Orders:  -     Basic metabolic panel; Future

## 2023-04-27 ENCOUNTER — ANESTHESIA EVENT (OUTPATIENT)
Dept: GASTROENTEROLOGY | Facility: HOSPITAL | Age: 48
End: 2023-04-27
Payer: COMMERCIAL

## 2023-04-27 RX ORDER — SODIUM CHLORIDE 0.9 % (FLUSH) 0.9 %
10 SYRINGE (ML) INJECTION EVERY 12 HOURS SCHEDULED
Status: CANCELLED | OUTPATIENT
Start: 2023-04-27

## 2023-04-27 RX ORDER — SODIUM CHLORIDE 0.9 % (FLUSH) 0.9 %
10 SYRINGE (ML) INJECTION AS NEEDED
Status: CANCELLED | OUTPATIENT
Start: 2023-04-27

## 2023-04-27 RX ORDER — SODIUM CHLORIDE 9 MG/ML
9 INJECTION, SOLUTION INTRAVENOUS CONTINUOUS PRN
Status: CANCELLED | OUTPATIENT
Start: 2023-04-27

## 2023-04-27 RX ORDER — MIDAZOLAM HYDROCHLORIDE 1 MG/ML
1 INJECTION INTRAMUSCULAR; INTRAVENOUS
Status: CANCELLED | OUTPATIENT
Start: 2023-04-27

## 2023-04-27 RX ORDER — SODIUM CHLORIDE 9 MG/ML
40 INJECTION, SOLUTION INTRAVENOUS AS NEEDED
Status: CANCELLED | OUTPATIENT
Start: 2023-04-27

## 2023-04-28 ENCOUNTER — ANESTHESIA (OUTPATIENT)
Dept: GASTROENTEROLOGY | Facility: HOSPITAL | Age: 48
End: 2023-04-28
Payer: COMMERCIAL

## 2023-04-28 ENCOUNTER — ON CAMPUS - OUTPATIENT (AMBULATORY)
Dept: URBAN - METROPOLITAN AREA HOSPITAL 85 | Facility: HOSPITAL | Age: 48
End: 2023-04-28
Payer: COMMERCIAL

## 2023-04-28 ENCOUNTER — HOSPITAL ENCOUNTER (OUTPATIENT)
Facility: HOSPITAL | Age: 48
Setting detail: HOSPITAL OUTPATIENT SURGERY
Discharge: HOME | End: 2023-04-28
Attending: INTERNAL MEDICINE | Admitting: INTERNAL MEDICINE
Payer: COMMERCIAL

## 2023-04-28 VITALS
RESPIRATION RATE: 16 BRPM | TEMPERATURE: 98.2 F | BODY MASS INDEX: 30.23 KG/M2 | HEIGHT: 63 IN | OXYGEN SATURATION: 97 % | HEART RATE: 74 BPM | WEIGHT: 170.64 LBS | DIASTOLIC BLOOD PRESSURE: 80 MMHG | SYSTOLIC BLOOD PRESSURE: 165 MMHG

## 2023-04-28 DIAGNOSIS — K57.30 DIVERTICULOSIS OF LARGE INTESTINE WITHOUT PERFORATION OR ABS: ICD-10-CM

## 2023-04-28 DIAGNOSIS — D12.2 BENIGN NEOPLASM OF ASCENDING COLON: ICD-10-CM

## 2023-04-28 DIAGNOSIS — Z12.11 ENCOUNTER FOR SCREENING FOR MALIGNANT NEOPLASM OF COLON: ICD-10-CM

## 2023-04-28 DIAGNOSIS — Z12.11 SCREENING FOR MALIGNANT NEOPLASM OF COLON: ICD-10-CM

## 2023-04-28 DIAGNOSIS — D12.4 BENIGN NEOPLASM OF DESCENDING COLON: ICD-10-CM

## 2023-04-28 DIAGNOSIS — K62.1 RECTAL POLYP: ICD-10-CM

## 2023-04-28 PROCEDURE — 45385 COLONOSCOPY W/LESION REMOVAL: CPT | Mod: 33 | Performed by: INTERNAL MEDICINE

## 2023-04-28 PROCEDURE — C1769 GUIDE WIRE: HCPCS | Performed by: INTERNAL MEDICINE

## 2023-04-28 PROCEDURE — 88305 TISSUE EXAM BY PATHOLOGIST: CPT | Performed by: INTERNAL MEDICINE

## 2023-04-28 PROCEDURE — 25010000002 PROPOFOL 10 MG/ML EMULSION: Performed by: NURSE ANESTHETIST, CERTIFIED REGISTERED

## 2023-04-28 RX ORDER — DEXAMETHASONE SODIUM PHOSPHATE 4 MG/ML
8 INJECTION, SOLUTION INTRA-ARTICULAR; INTRALESIONAL; INTRAMUSCULAR; INTRAVENOUS; SOFT TISSUE ONCE AS NEEDED
Status: DISCONTINUED | OUTPATIENT
Start: 2023-04-28 | End: 2023-04-28 | Stop reason: HOSPADM

## 2023-04-28 RX ORDER — ONDANSETRON 2 MG/ML
4 INJECTION INTRAMUSCULAR; INTRAVENOUS ONCE AS NEEDED
Status: DISCONTINUED | OUTPATIENT
Start: 2023-04-28 | End: 2023-04-28 | Stop reason: HOSPADM

## 2023-04-28 RX ORDER — PROPOFOL 10 MG/ML
VIAL (ML) INTRAVENOUS AS NEEDED
Status: DISCONTINUED | OUTPATIENT
Start: 2023-04-28 | End: 2023-04-28 | Stop reason: SURG

## 2023-04-28 RX ORDER — LIDOCAINE HYDROCHLORIDE 20 MG/ML
INJECTION, SOLUTION INFILTRATION; PERINEURAL AS NEEDED
Status: DISCONTINUED | OUTPATIENT
Start: 2023-04-28 | End: 2023-04-28 | Stop reason: SURG

## 2023-04-28 RX ORDER — SODIUM CHLORIDE 9 MG/ML
9 INJECTION, SOLUTION INTRAVENOUS CONTINUOUS
Status: DISCONTINUED | OUTPATIENT
Start: 2023-04-28 | End: 2023-04-28 | Stop reason: HOSPADM

## 2023-04-28 RX ORDER — IPRATROPIUM BROMIDE AND ALBUTEROL SULFATE 2.5; .5 MG/3ML; MG/3ML
3 SOLUTION RESPIRATORY (INHALATION) ONCE AS NEEDED
Status: DISCONTINUED | OUTPATIENT
Start: 2023-04-28 | End: 2023-04-28 | Stop reason: HOSPADM

## 2023-04-28 RX ORDER — GLYCOPYRROLATE 0.2 MG/ML
INJECTION INTRAMUSCULAR; INTRAVENOUS AS NEEDED
Status: DISCONTINUED | OUTPATIENT
Start: 2023-04-28 | End: 2023-04-28 | Stop reason: SURG

## 2023-04-28 RX ADMIN — PROPOFOL 50 MG: 10 INJECTION, EMULSION INTRAVENOUS at 10:32

## 2023-04-28 RX ADMIN — PROPOFOL 50 MG: 10 INJECTION, EMULSION INTRAVENOUS at 10:39

## 2023-04-28 RX ADMIN — PROPOFOL 100 MG: 10 INJECTION, EMULSION INTRAVENOUS at 10:31

## 2023-04-28 RX ADMIN — GLYCOPYRROLATE 0.2 MG: 0.2 INJECTION INTRAMUSCULAR; INTRAVENOUS at 10:30

## 2023-04-28 RX ADMIN — PROPOFOL 50 MG: 10 INJECTION, EMULSION INTRAVENOUS at 10:37

## 2023-04-28 RX ADMIN — PROPOFOL 50 MG: 10 INJECTION, EMULSION INTRAVENOUS at 10:44

## 2023-04-28 RX ADMIN — PROPOFOL 50 MG: 10 INJECTION, EMULSION INTRAVENOUS at 10:33

## 2023-04-28 RX ADMIN — LIDOCAINE HYDROCHLORIDE 100 MG: 20 INJECTION, SOLUTION INFILTRATION; PERINEURAL at 10:31

## 2023-04-28 RX ADMIN — SODIUM CHLORIDE: 0.9 INJECTION, SOLUTION INTRAVENOUS at 10:23

## 2023-04-28 RX ADMIN — PROPOFOL 50 MG: 10 INJECTION, EMULSION INTRAVENOUS at 10:35

## 2023-04-28 RX ADMIN — GLYCOPYRROLATE 0.2 MG: 0.2 INJECTION INTRAMUSCULAR; INTRAVENOUS at 10:25

## 2023-04-28 RX ADMIN — PROPOFOL 50 MG: 10 INJECTION, EMULSION INTRAVENOUS at 10:41

## 2023-04-28 RX ADMIN — PROPOFOL 50 MG: 10 INJECTION, EMULSION INTRAVENOUS at 10:46

## 2023-04-28 NOTE — H&P
GI CONSULT  NOTE:    Referring Provider:    Bernardinotarah Lizz HOLLEY  [unfilled]    Chief complaint: <principal problem not specified>    Subjective .       Pre op diagnosis  Screening for malignant neoplasm of colon [Z12.11]      History of present illness:      Eleanor Bonds is a 47 y.o. female who presents today for Procedure(s):  COLONOSCOPY for the indications listed below.     The updated Patient Profile was reviewed prior to the procedure, in conjunction with the Physical Exam, including medical conditions, surgical procedures, medications, allergies, family history and social history.     Pre-operatively, I reviewed the indication(s) for the procedure, the risks of the procedure [including but not limited to: unexpected bleeding possibly requiring hospitalization and/or unplanned repeat procedures, perforation possibly requiring surgical treatment, missed lesions and complications of sedation/MAC (also explained by anesthesia staff)].     I have evaluated the patient for risks associated with the planned anesthesia and the procedure to be performed and find the patient an acceptable candidate for IV sedation.    Multiple opportunities were provided for any questions or concerns, and all questions were answered satisfactorily before any anesthesia was administered. We will proceed with the planned procedure.    Past Medical History:  Past Medical History:   Diagnosis Date   • Arthritis of spine    • Cervical cancer    • GERD (gastroesophageal reflux disease)    • Hypertension    • Kidney calculi    • Kidney cysts    • Low back pain     previous back surgery   • May-Thurner syndrome     to be ruled out seeing specialist       Past Surgical History:  Past Surgical History:   Procedure Laterality Date   • BACK SURGERY     • CHOLECYSTECTOMY N/A 7/11/2022    Procedure: CHOLECYSTECTOMY LAPAROSCOPIC WITH DAVINCI ROBOT;  Surgeon: Clarence Shanks MD;  Location: [facility] OR;  Service: Robotics - DaVinci;   Laterality: N/A;   • CYSTOSCOPY W/ URETERAL STENT PLACEMENT Right 8/8/2021    Procedure: CYSTOSCOPY, RIGHT URETEROSCOPY, STONE EXTRACTION, RETROGRADE AND STENT PLACEMENT RIGHT URETER (NO STRING);  Surgeon: Jose Maria Mcelroy MD;  Location: [facility] OR;  Service: Urology;  Laterality: Right;   • HYSTERECTOMY  12/2020       Social History:  Social History     Tobacco Use   • Smoking status: Former   • Smokeless tobacco: Never   Vaping Use   • Vaping Use: Never used   Substance Use Topics   • Alcohol use: Yes     Comment: occ   • Drug use: No       Family History:  Family History   Problem Relation Age of Onset   • Diabetes Mother    • Hyperlipidemia Mother    • Hypertension Mother    • Arthritis Mother    • Heart disease Father    • Hyperlipidemia Father    • Hypertension Father    • COPD Father    • Emphysema Father        Medications:  Medications Prior to Admission   Medication Sig Dispense Refill Last Dose   • losartan (COZAAR) 25 MG tablet TAKE 1 TABLET BY MOUTH DAILY (Patient taking differently: Take 1 tablet by mouth Daily.) 90 tablet 1 4/28/2023   • omeprazole (priLOSEC) 20 MG capsule Take 1 capsule by mouth Daily As Needed.   More than a month       Scheduled Meds:   Continuous Infusions:sodium chloride, 9 mL/hr      PRN Meds:.    ALLERGIES:  Aspirin    ROS:  The following systems were reviewed and negative;  Constitution:  No fevers, chills, no unintentional weight loss  Skin: no rash, no jaundice  Eyes:  No blurry vision, no eye pain  HENT:  No change in hearing or smell  Resp:  No dyspnea or cough  CV:  No chest pain or palpitations  :  No dysuria, hematuria  Musculoskeletal:  No leg cramps or arthralgias  Neuro:  No tremor, no numbness  Psych:  No depression or confsusion    Objective     Vital Signs:   Vitals:    04/14/23 1008 04/28/23 0850   BP:  137/83   BP Location:  Left arm   Patient Position:  Lying   Resp:  17   Temp:  98.2 °F (36.8 °C)   TempSrc:  Oral   SpO2:  98%   Weight: 78.5 kg (173 lb)  "77.4 kg (170 lb 10.2 oz)   Height: 160 cm (63\") 160 cm (63\")       Physical Exam:       General Appearance:    Awake and alert, in no acute distress   Head:    Normocephalic, without obvious abnormality, atraumatic   Throat:   No oral lesions, no thrush, oral mucosa moist   Lungs:     respirations regular, even and unlabored   Skin:   No rash, no jaundice       Results Review:  Lab Results (last 24 hours)     ** No results found for the last 24 hours. **          Imaging Results (Last 24 Hours)     ** No results found for the last 24 hours. **           I reviewed the patient's labs and imaging.    ASSESSMENT AND PLAN:      Active Problems:    * No active hospital problems. *       Procedure(s):  COLONOSCOPY      I discussed the patient's findings and my recommendations with the patient.    FEDE Montejo MD  04/28/23  10:00 EDT                "

## 2023-04-28 NOTE — ANESTHESIA PREPROCEDURE EVALUATION
Anesthesia Evaluation     Patient summary reviewed and Nursing notes reviewed   NPO Solid Status: > 8 hours  NPO Liquid Status: > 8 hours           Airway   Mallampati: I  TM distance: >3 FB  Neck ROM: full  No difficulty expected  Dental - normal exam     Pulmonary - normal exam   (+) shortness of breath,   Cardiovascular - normal exam    (+) hypertension, DVT,     ROS comment: Impression  Structurally and functionally normal cardiac valves.  Normal left ventricular size and contractility.  Normal echo Doppler study.  Left ventricular ejection fraction is 60%.      Neuro/Psych  (+) dizziness/light headedness,    GI/Hepatic/Renal/Endo - negative ROS     Musculoskeletal (-) negative ROS    Abdominal  - normal exam    Bowel sounds: normal.   Substance History - negative use     OB/GYN negative ob/gyn ROS         Other      history of cancer remission      Other Comment: Impression  Structurally and functionally normal cardiac valves.  Normal left ventricular size and contractility.  Normal echo Doppler study.  Left ventricular ejection fraction is 60%.    ROS/Med Hx Other: CERVICAL CANCER  NORMAL EF 2019  LAP GB 7/2022 UNEVENTFUL                Anesthesia Plan    ASA 3     general     intravenous induction     Anesthetic plan, risks, benefits, and alternatives have been provided, discussed and informed consent has been obtained with: patient.    Plan discussed with CRNA.        CODE STATUS:

## 2023-04-28 NOTE — ANESTHESIA POSTPROCEDURE EVALUATION
Patient: Eleanor Bonds    Procedure Summary     Date: 04/28/23 Room / Location: [facility] ENDOSCOPY 1 / [facility] ENDOSCOPY    Anesthesia Start: 1023 Anesthesia Stop: 1054    Procedure: COLONOSCOPY WITH POLYPECTOMY X4 Diagnosis:       Screening for malignant neoplasm of colon      (Screening for malignant neoplasm of colon [Z12.11])    Surgeons: NICK Montejo MD Provider: Flaca Barba MD    Anesthesia Type: general ASA Status: 3          Anesthesia Type: general    Vitals  Vitals Value Taken Time   /80 04/28/23 1114   Temp     Pulse 74 04/28/23 1114   Resp 16 04/28/23 1114   SpO2 97 % 04/28/23 1114           Post Anesthesia Care and Evaluation    Patient location during evaluation: PACU  Patient participation: complete - patient participated  Level of consciousness: awake and alert  Pain management: satisfactory to patient    Airway patency: patent  Anesthetic complications: No anesthetic complications  PONV Status: none  Cardiovascular status: acceptable  Respiratory status: acceptable  Hydration status: acceptable

## 2023-04-28 NOTE — DISCHARGE INSTRUCTIONS
A responsible adult should stay with you and you should rest quietly for the rest of the day.    Do not drink alcohol, drive, operate any heavy machinery or power tools or make any legal/important decisions for the next 24 hours.     Progress your diet as tolerated.  If you begin to experience severe pain, increased shortness of breath, racing heartbeat or a fever above 101 F, seek immediate medical attention.     Follow up with MD as instructed. Call office for results in 3 to 5 days if needed.    [phone]    Impression:  1.  4 colon polyps throughout the colon removed in a single piece with cold snare polypectomy and completely removed.  In the ascending colon 1 polyp 5 mm in diameter.  In the descending colon 2 polyps between 3 and 4 mm in diameter.  In the rectum 1 polyp 3 mm in diameter.  2.  Moderate diverticulosis with small openings in the descending and sigmoid colon and no bleeding  3.  Normal mucosa in the terminal ileum     Recommendations:  -Recall for colonoscopy based on pathology: 3 years if 3 or more adenomatous polyps or polyp over 10 mm;  7 years if 1 or 2 adenomatous polyps; otherwise 10 years unless needed sooner  -Polyp prevention education  -Fiber supplements daily if constipation

## 2023-04-28 NOTE — OP NOTE
COLONOSCOPY Procedure Report    Patient Name:  Eleanor Bonds  YOB: 1975    Date of Surgery:  4/28/2023     Pre-Op Diagnosis:  Screening for malignant neoplasm of colon [Z12.11]       Post-Op Diagnosis Codes:     * Screening for malignant neoplasm of colon [Z12.11]    Postop diagnosis:  1.  Colon polyps  2.  Diverticulosis    Procedure/CPT® Codes:      Procedure(s):  COLONOSCOPY WITH POLYPECTOMY X4    Staff:  Surgeon(s):  NICK Mnotejo MD      Anesthesia: Monitored Anesthesia Care    Description of Procedure:  A description of the procedure as well as risks, benefits and alternative methods were explained to the patient who voiced understanding and signed the corresponding consent form. A physical exam was performed and vital signs were monitored throughout the procedure.    A rectal exam was performed which was normal. An Olympus colonoscope was placed into the rectum and proceeded under direct visualization through the colon until the cecum and appendiceal orifice were identified. Careful visualization occurred upon slow withdraw of the scope. The scope was then retroflexed and the distal rectum was visualized. The quality of the prep was good. The procedure was not difficult and there were no immediate complications.  There was no blood loss.    Impression:  1.  4 colon polyps throughout the colon removed in a single piece with cold snare polypectomy and completely removed.  In the ascending colon 1 polyp 5 mm in diameter.  In the descending colon 2 polyps between 3 and 4 mm in diameter.  In the rectum 1 polyp 3 mm in diameter.  2.  Moderate diverticulosis with small openings in the descending and sigmoid colon and no bleeding  3.  Normal mucosa in the terminal ileum    Recommendations:  -Recall for colonoscopy based on pathology: 3 years if 3 or more adenomatous polyps or polyp over 10 mm;  7 years if 1 or 2 adenomatous polyps; otherwise 10 years unless needed sooner  -Polyp prevention  education  -Fiber supplements daily if constipation      FEDE Montejo MD     Date: 4/28/2023    Time: 10:56 EDT

## 2023-05-01 LAB
LAB AP CASE REPORT: NORMAL
PATH REPORT.FINAL DX SPEC: NORMAL
PATH REPORT.GROSS SPEC: NORMAL

## 2023-07-25 ENCOUNTER — TELEPHONE (OUTPATIENT)
Dept: NEUROSURGERY | Facility: CLINIC | Age: 48
End: 2023-07-25
Payer: COMMERCIAL

## 2023-07-25 ENCOUNTER — OFFICE VISIT (OUTPATIENT)
Dept: FAMILY MEDICINE CLINIC | Facility: CLINIC | Age: 48
End: 2023-07-25
Payer: COMMERCIAL

## 2023-07-25 VITALS
WEIGHT: 170 LBS | OXYGEN SATURATION: 99 % | HEART RATE: 80 BPM | TEMPERATURE: 98.1 F | BODY MASS INDEX: 30.11 KG/M2 | SYSTOLIC BLOOD PRESSURE: 131 MMHG | DIASTOLIC BLOOD PRESSURE: 83 MMHG

## 2023-07-25 DIAGNOSIS — I10 HYPERTENSION, UNSPECIFIED TYPE: Primary | ICD-10-CM

## 2023-07-25 DIAGNOSIS — M54.16 LUMBAR RADICULOPATHY: Primary | ICD-10-CM

## 2023-07-25 DIAGNOSIS — M51.26 LUMBAR DISC HERNIATION: ICD-10-CM

## 2023-07-25 DIAGNOSIS — M48.061 SPINAL STENOSIS, LUMBAR REGION, WITHOUT NEUROGENIC CLAUDICATION: ICD-10-CM

## 2023-07-25 PROCEDURE — 99213 OFFICE O/P EST LOW 20 MIN: CPT | Performed by: NURSE PRACTITIONER

## 2023-07-25 NOTE — TELEPHONE ENCOUNTER
"  Caller: CammieEleanor    Relationship: Self    Best call back number: [phone]    What orders are you requesting (i.e. lab or imaging): MRI LUMBAR (WO?)     In what timeframe would the patient need to come in: ASAP - PATIENT REPORTS WORSENING SYMPTOMS AFTER MEDROL DOSEPAK ROUND COMPLETED.     Where will you receive your lab/imaging services:  [facility] OR [facility]; PATIENT AGREEABLE TO BOTH.     Additional notes: PER 7/5 OVN: \"If the pain does not improve, will order MRI of the lumbar spine and have her follow-up.\"    PATIENT REQUESTING NOTIFICATION ONCE MRI ORDER SENT SO SHE KNOWS IF IT'S  [facility] SCHEDULING OR [facility]. STATES SHE WILL CALL US ONCE MRI SCHEDULED TO SETUP FOLLOW UP W/ DR GREENBERG.     "

## 2023-07-25 NOTE — PROGRESS NOTES
Subjective     Eleanor Bonds is a 47 y.o. female.     History of Present Illness  Pt is here today for a 6 mo follow up on HTN.  She is currently on losartan 25mg daily.   She is doing well on the medication.   Denies CP, SOA, dizziness.  She continues to have a HA daily,  She believes it is her eyes- she will schedule an eye exam.     Pt was diagnosed with cervical cancer last year and sees Dr. Starkey.   She had a partial hysterectomy.   She feels like her hormones are off some- cries easily.  Denies any depression.     Pt sees Dr. Campos for chronic low back pain.   She has been given a medrol pack.   Was advised to follow up with him if pain continued for an MRI  States the pain has not gotten better, will be following up with him on this.     On her CT scan it was noted that she had bilateral pubic varices that could be related to May Thurner Syndrome. She was referred to vascular. She has not seen them as she never heard from them to schedule an appt. She was given phone number today.         Labs- UTD  Pap smear-sees Dr. Starkey  Mammogram- 1/30/23  Colonoscopy- 4/2023- repeat 5ish yrs     Vaccines:  Flu- refused  PNA-  Shingles-  Tdap- due  Covid-19-     Dental exam-  Eye exam-       The following portions of the patient's history were reviewed and updated as appropriate: allergies, current medications, past family history, past medical history, past social history, past surgical history, and problem list.    Review of Systems   Constitutional:  Negative for chills, fatigue and fever.   Respiratory:  Negative for chest tightness and shortness of breath.    Cardiovascular:  Negative for chest pain and palpitations.   Gastrointestinal:  Positive for diarrhea. Negative for abdominal pain, nausea and vomiting.   Neurological:  Positive for headache. Negative for dizziness.   Psychiatric/Behavioral:  Negative for depressed mood and stress. The patient is not nervous/anxious.      Objective     /83  (BP Location: Left arm, Patient Position: Sitting, Cuff Size: Adult)   Pulse 80   Temp 98.1 °F (36.7 °C) (Oral)   Wt 77.1 kg (170 lb)   LMP 03/03/2020   SpO2 99%   BMI 30.11 kg/m²     Current Outpatient Medications on File Prior to Visit   Medication Sig Dispense Refill    losartan (COZAAR) 25 MG tablet TAKE 1 TABLET BY MOUTH DAILY (Patient taking differently: Take 1 tablet by mouth Daily.) 90 tablet 1    omeprazole (priLOSEC) 20 MG capsule Take 1 capsule by mouth Daily As Needed.      [DISCONTINUED] methylPREDNISolone (MEDROL) 4 MG dose pack Take as directed on package instructions. 21 tablet 0     No current facility-administered medications on file prior to visit.        BMI is >= 30 and <35. (Class 1 Obesity). The following options were offered after discussion;: exercise counseling/recommendations and nutrition counseling/recommendations       Physical Exam  Constitutional:       General: She is not in acute distress.     Appearance: Normal appearance. She is not ill-appearing.   HENT:      Head: Normocephalic and atraumatic.   Cardiovascular:      Rate and Rhythm: Normal rate and regular rhythm.      Heart sounds: No murmur heard.  Musculoskeletal:         General: Normal range of motion.   Skin:     General: Skin is warm and dry.   Neurological:      General: No focal deficit present.      Mental Status: She is alert and oriented to person, place, and time.   Psychiatric:         Mood and Affect: Mood normal.         Behavior: Behavior normal.         Thought Content: Thought content normal.         Judgment: Judgment normal.         Assessment & Plan     Diagnoses and all orders for this visit:    1. Hypertension, unspecified type (Primary)  Comments:  stable  cont losartan  labs UTD    Will call vascular to schedule appt for possible may turners syndrome  Will follow up with spine surgeon.

## 2023-08-15 ENCOUNTER — APPOINTMENT (OUTPATIENT)
Dept: VASCULAR SURGERY | Facility: HOSPITAL | Age: 48
End: 2023-08-15
Payer: COMMERCIAL

## 2023-08-15 PROCEDURE — G0463 HOSPITAL OUTPT CLINIC VISIT: HCPCS

## 2023-08-18 RX ORDER — LOSARTAN POTASSIUM 25 MG/1
TABLET ORAL
Qty: 90 TABLET | Refills: 1 | Status: SHIPPED | OUTPATIENT
Start: 2023-08-18

## 2023-08-24 ENCOUNTER — HOSPITAL ENCOUNTER (OUTPATIENT)
Dept: MRI IMAGING | Facility: HOSPITAL | Age: 48
Discharge: HOME | End: 2023-08-24
Admitting: NEUROLOGICAL SURGERY
Payer: COMMERCIAL

## 2023-08-24 DIAGNOSIS — M51.26 LUMBAR DISC HERNIATION: ICD-10-CM

## 2023-08-24 DIAGNOSIS — M54.16 LUMBAR RADICULOPATHY: ICD-10-CM

## 2023-08-24 DIAGNOSIS — M48.061 SPINAL STENOSIS, LUMBAR REGION, WITHOUT NEUROGENIC CLAUDICATION: ICD-10-CM

## 2023-08-24 PROCEDURE — 72148 MRI LUMBAR SPINE W/O DYE: CPT

## 2023-09-07 ENCOUNTER — PATIENT MESSAGE (OUTPATIENT)
Dept: FAMILY MEDICINE CLINIC | Facility: CLINIC | Age: 48
End: 2023-09-07
Payer: COMMERCIAL

## 2023-09-07 RX ORDER — AMOXICILLIN AND CLAVULANATE POTASSIUM 875; 125 MG/1; MG/1
1 TABLET, FILM COATED ORAL 2 TIMES DAILY
Qty: 20 TABLET | Refills: 0 | Status: SHIPPED | OUTPATIENT
Start: 2023-09-07 | End: 2023-09-17

## 2023-09-27 RX ORDER — AZITHROMYCIN 250 MG/1
TABLET, FILM COATED ORAL
Qty: 6 TABLET | Refills: 0 | Status: SHIPPED | OUTPATIENT
Start: 2023-09-27

## 2023-10-04 RX ORDER — PREDNISONE 20 MG/1
40 TABLET ORAL DAILY
Qty: 10 TABLET | Refills: 0 | Status: SHIPPED | OUTPATIENT
Start: 2023-10-04 | End: 2023-10-09

## 2023-11-28 RX ORDER — LOSARTAN POTASSIUM 25 MG/1
TABLET ORAL
Qty: 90 TABLET | Refills: 1 | Status: SHIPPED | OUTPATIENT
Start: 2023-11-28

## 2024-01-08 ENCOUNTER — HOSPITAL ENCOUNTER (EMERGENCY)
Facility: HOSPITAL | Age: 49
Discharge: HOME | End: 2024-01-08
Admitting: EMERGENCY MEDICINE
Payer: COMMERCIAL

## 2024-01-08 ENCOUNTER — APPOINTMENT (OUTPATIENT)
Dept: GENERAL RADIOLOGY | Facility: HOSPITAL | Age: 49
End: 2024-01-08
Payer: COMMERCIAL

## 2024-01-08 VITALS
HEART RATE: 63 BPM | HEIGHT: 63 IN | TEMPERATURE: 98.6 F | DIASTOLIC BLOOD PRESSURE: 82 MMHG | SYSTOLIC BLOOD PRESSURE: 158 MMHG | RESPIRATION RATE: 19 BRPM | WEIGHT: 174.82 LBS | OXYGEN SATURATION: 100 % | BODY MASS INDEX: 30.98 KG/M2

## 2024-01-08 DIAGNOSIS — S86.911A STRAIN OF RIGHT KNEE, INITIAL ENCOUNTER: Primary | ICD-10-CM

## 2024-01-08 PROCEDURE — 99283 EMERGENCY DEPT VISIT LOW MDM: CPT

## 2024-01-08 PROCEDURE — 73564 X-RAY EXAM KNEE 4 OR MORE: CPT

## 2024-01-08 RX ORDER — NAPROXEN 500 MG/1
500 TABLET ORAL 2 TIMES DAILY WITH MEALS
Qty: 14 TABLET | Refills: 0 | Status: SHIPPED | OUTPATIENT
Start: 2024-01-08 | End: 2024-01-15

## 2024-01-08 NOTE — ED PROVIDER NOTES
Subjective   History of Present Illness  Patient is a 48-year-old white female who presents to complains of an injury to her right knee.  She states she was sitting in a chair when her dog ran past and struck her lower legs causing them to shift injuring her right knee.  She states this happened yesterday.  Pain is worse with weightbearing and movement.  No numbness tingling or weakness in the extremity.      Review of Systems   Musculoskeletal:         Left knee pain   Neurological:  Negative for weakness and numbness.       Past Medical History:   Diagnosis Date    Arthritis of spine     Cervical cancer     GERD (gastroesophageal reflux disease)     Hypertension     Kidney calculi     Kidney cysts     Low back pain     previous back surgery    May-Thurner syndrome     to be ruled out seeing specialist       Allergies   Allergen Reactions    Aspirin GI Intolerance       Past Surgical History:   Procedure Laterality Date    BACK SURGERY      CHOLECYSTECTOMY N/A 7/11/2022    Procedure: CHOLECYSTECTOMY LAPAROSCOPIC WITH DAVINCI ROBOT;  Surgeon: Clarence Shanks MD;  Location: [facility] MAIN OR;  Service: Robotics - DaVinci;  Laterality: N/A;    COLONOSCOPY N/A 4/28/2023    Procedure: COLONOSCOPY WITH POLYPECTOMY X4;  Surgeon: NICK Montejo MD;  Location: [facility] ENDOSCOPY;  Service: Gastroenterology;  Laterality: N/A;  Post: COLON POLYPS    CYSTOSCOPY W/ URETERAL STENT PLACEMENT Right 8/8/2021    Procedure: CYSTOSCOPY, RIGHT URETEROSCOPY, STONE EXTRACTION, RETROGRADE AND STENT PLACEMENT RIGHT URETER (NO STRING);  Surgeon: Jose Maria Mcelroy MD;  Location: [facility] MAIN OR;  Service: Urology;  Laterality: Right;    HYSTERECTOMY  12/2020       Family History   Problem Relation Age of Onset    Diabetes Mother     Hyperlipidemia Mother     Hypertension Mother     Arthritis Mother     Heart disease Father     Hyperlipidemia Father     Hypertension Father     COPD Father     Emphysema Father        Social History      Socioeconomic History    Marital status:    Tobacco Use    Smoking status: Former    Smokeless tobacco: Never   Vaping Use    Vaping Use: Never used   Substance and Sexual Activity    Alcohol use: Yes     Comment: occ    Drug use: No    Sexual activity: Defer           Objective   Physical Exam  Vital signs and triage nurse note reviewed.  Constitutional: Awake, alert; well-developed and well-nourished. No acute distress is noted.  Cardiovascular: Regular rate and rhythm  Pulmonary: Respiratory effort regular nonlabored  Musculoskeletal: Independent range of motion of all extremities.  Mild tenderness over the medial aspect of the right knee.  No gross deformity.  No overlying erythema or ecchymosis.  No appreciable edema.  Negative anterior posterior drawer.  Negative Eddie.  Good cap refill and sensation distally.  Neuro: Alert oriented x3, speech is clear and appropriate, GCS 15.    Skin: Flesh tone, warm, dry, intact; no erythematous or petechial rash or lesion.    Procedures           ED Course      Labs Reviewed - No data to display  XR Knee 4+ View Right    Result Date: 1/8/2024  Impression: No acute findings. Electronically Signed: Yoselin White MD  1/8/2024 9:42 AM EST  Workstation ID: [id number]   Medications - No data to display                                         Medical Decision Making  Patient presents today with the above complaint.    She had the above exam and evaluation.  X-rays were obtained.    Workup: X-ray of the right knee shows no acute findings.    Findings were discussed with patient.  Ace wrap was applied to the right knee.  She will be discharged home with prescription for Naprosyn and instructed to follow-up with orthopedics if her pain does not improve.  She was given warning signs for prompt return to the ED and voiced understanding.    Problems Addressed:  Strain of right knee, initial encounter: complicated acute illness or injury    Amount and/or Complexity of Data  Reviewed  Radiology: ordered.    Risk  Prescription drug management.        Final diagnoses:   Strain of right knee, initial encounter       ED Disposition  ED Disposition       ED Disposition   Discharge    Condition   Stable    Comment   --               William Eddy II, MD  [address] [place] [address] [phone]      As needed         Medication List        New Prescriptions      naproxen 500 MG EC tablet  Commonly known as: EC NAPROSYN  Take 1 tablet by mouth 2 (Two) Times a Day With Meals for 7 days.               Where to Get Your Medications        These medications were sent to [id number] - [place], [place] - [address] AT [address] - [phone]  - [phone]   [address] [place] [address]      Phone: [phone]   naproxen 500 MG EC tablet            [place], APRN  01/08/24 [id number]

## 2024-01-08 NOTE — DISCHARGE INSTRUCTIONS
Wear Ace wrap until pain improves.  Elevate and ice over the next couple days.  Take Naprosyn as prescribed.  Follow-up with orthopedic specialist if your pain does not improve in the next 1 to 2 weeks.  Return for new or worsening symptoms.

## 2024-01-08 NOTE — ED NOTES
Pt reports she twisted her rt knee and felt a pop yesterday after getting her foot caught in her dog's collar, pt denies falling. C/o pain that is worse with bending knee or ambulating.

## 2024-04-01 ENCOUNTER — OFFICE VISIT (OUTPATIENT)
Dept: FAMILY MEDICINE CLINIC | Facility: CLINIC | Age: 49
End: 2024-04-01
Payer: COMMERCIAL

## 2024-04-01 ENCOUNTER — LAB (OUTPATIENT)
Dept: LAB | Facility: HOSPITAL | Age: 49
End: 2024-04-01
Payer: COMMERCIAL

## 2024-04-01 ENCOUNTER — HOSPITAL ENCOUNTER (OUTPATIENT)
Dept: CARDIOLOGY | Facility: HOSPITAL | Age: 49
Discharge: HOME | End: 2024-04-01
Payer: COMMERCIAL

## 2024-04-01 ENCOUNTER — LAB (OUTPATIENT)
Dept: FAMILY MEDICINE CLINIC | Facility: CLINIC | Age: 49
End: 2024-04-01
Payer: COMMERCIAL

## 2024-04-01 VITALS
WEIGHT: 175.6 LBS | SYSTOLIC BLOOD PRESSURE: 150 MMHG | BODY MASS INDEX: 31.11 KG/M2 | TEMPERATURE: 97.8 F | OXYGEN SATURATION: 99 % | HEART RATE: 70 BPM | DIASTOLIC BLOOD PRESSURE: 87 MMHG | HEIGHT: 63 IN

## 2024-04-01 DIAGNOSIS — M79.89 PAIN AND SWELLING OF LEFT LOWER LEG: Primary | ICD-10-CM

## 2024-04-01 DIAGNOSIS — M79.662 PAIN AND SWELLING OF LEFT LOWER LEG: Primary | ICD-10-CM

## 2024-04-01 LAB
BASOPHILS # BLD AUTO: 0.05 10*3/MM3 (ref 0–0.2)
BASOPHILS NFR BLD AUTO: 0.4 % (ref 0–1.5)
BH CV LOW VAS LEFT COMMON FEMORAL SPONT: 1
BH CV LOW VAS LEFT POPLITEAL SPONT: 1
BH CV LOW VAS LEFT PROXIMAL FEMORAL SPONT: 1
BH CV LOWER VASCULAR LEFT COMMON FEMORAL AUGMENT: NORMAL
BH CV LOWER VASCULAR LEFT COMMON FEMORAL COMPETENT: NORMAL
BH CV LOWER VASCULAR LEFT COMMON FEMORAL COMPRESS: NORMAL
BH CV LOWER VASCULAR LEFT COMMON FEMORAL PHASIC: NORMAL
BH CV LOWER VASCULAR LEFT COMMON FEMORAL SPONT: NORMAL
BH CV LOWER VASCULAR LEFT COMMON FEMORAL THROMBUS: NORMAL
BH CV LOWER VASCULAR LEFT DISTAL FEMORAL COMPRESS: NORMAL
BH CV LOWER VASCULAR LEFT GASTRONEMIUS COMPRESS: NORMAL
BH CV LOWER VASCULAR LEFT GREATER SAPH AK COMPRESS: NORMAL
BH CV LOWER VASCULAR LEFT GREATER SAPH BK COMPRESS: NORMAL
BH CV LOWER VASCULAR LEFT LESSER SAPH COMPRESS: NORMAL
BH CV LOWER VASCULAR LEFT MID FEMORAL AUGMENT: NORMAL
BH CV LOWER VASCULAR LEFT MID FEMORAL COMPETENT: NORMAL
BH CV LOWER VASCULAR LEFT MID FEMORAL COMPRESS: NORMAL
BH CV LOWER VASCULAR LEFT MID FEMORAL PHASIC: NORMAL
BH CV LOWER VASCULAR LEFT MID FEMORAL SPONT: NORMAL
BH CV LOWER VASCULAR LEFT PERONEAL COMPRESS: NORMAL
BH CV LOWER VASCULAR LEFT POPLITEAL AUGMENT: NORMAL
BH CV LOWER VASCULAR LEFT POPLITEAL COMPETENT: NORMAL
BH CV LOWER VASCULAR LEFT POPLITEAL COMPRESS: NORMAL
BH CV LOWER VASCULAR LEFT POPLITEAL PHASIC: NORMAL
BH CV LOWER VASCULAR LEFT POPLITEAL SPONT: NORMAL
BH CV LOWER VASCULAR LEFT POSTERIOR TIBIAL COMPRESS: NORMAL
BH CV LOWER VASCULAR LEFT PROXIMAL FEMORAL AUGMENT: NORMAL
BH CV LOWER VASCULAR LEFT PROXIMAL FEMORAL COMPETENT: NORMAL
BH CV LOWER VASCULAR LEFT PROXIMAL FEMORAL COMPRESS: NORMAL
BH CV LOWER VASCULAR LEFT PROXIMAL FEMORAL PHASIC: NORMAL
BH CV LOWER VASCULAR LEFT PROXIMAL FEMORAL SPONT: NORMAL
BH CV LOWER VASCULAR LEFT PROXIMAL FEMORAL THROMBUS: NORMAL
BH CV LOWER VASCULAR LEFT SAPHENOFEMORAL JUNCTION COMPRESS: NORMAL
BH CV LOWER VASCULAR LEFT VARICOSITY BK COMPRESS: NORMAL
BH CV LOWER VASCULAR RIGHT COMMON FEMORAL AUGMENT: NORMAL
BH CV LOWER VASCULAR RIGHT COMMON FEMORAL COMPETENT: NORMAL
BH CV LOWER VASCULAR RIGHT COMMON FEMORAL COMPRESS: NORMAL
BH CV LOWER VASCULAR RIGHT COMMON FEMORAL PHASIC: NORMAL
BH CV LOWER VASCULAR RIGHT COMMON FEMORAL SPONT: NORMAL
BH CV VAS PRELIMINARY FINDINGS SCRIPTING: 1
DEPRECATED RDW RBC AUTO: 41.6 FL (ref 37–54)
EOSINOPHIL # BLD AUTO: 0.38 10*3/MM3 (ref 0–0.4)
EOSINOPHIL NFR BLD AUTO: 3.2 % (ref 0.3–6.2)
ERYTHROCYTE [DISTWIDTH] IN BLOOD BY AUTOMATED COUNT: 12.8 % (ref 12.3–15.4)
HCT VFR BLD AUTO: 40.2 % (ref 34–46.6)
HGB BLD-MCNC: 13.2 G/DL (ref 12–15.9)
IMM GRANULOCYTES # BLD AUTO: 0.03 10*3/MM3 (ref 0–0.05)
IMM GRANULOCYTES NFR BLD AUTO: 0.3 % (ref 0–0.5)
LYMPHOCYTES # BLD AUTO: 3.58 10*3/MM3 (ref 0.7–3.1)
LYMPHOCYTES NFR BLD AUTO: 30.1 % (ref 19.6–45.3)
MCH RBC QN AUTO: 29.3 PG (ref 26.6–33)
MCHC RBC AUTO-ENTMCNC: 32.8 G/DL (ref 31.5–35.7)
MCV RBC AUTO: 89.1 FL (ref 79–97)
MONOCYTES # BLD AUTO: 0.74 10*3/MM3 (ref 0.1–0.9)
MONOCYTES NFR BLD AUTO: 6.2 % (ref 5–12)
NEUTROPHILS NFR BLD AUTO: 59.8 % (ref 42.7–76)
NEUTROPHILS NFR BLD AUTO: 7.12 10*3/MM3 (ref 1.7–7)
NRBC BLD AUTO-RTO: 0 /100 WBC (ref 0–0.2)
PLATELET # BLD AUTO: 323 10*3/MM3 (ref 140–450)
PMV BLD AUTO: 12.4 FL (ref 6–12)
RBC # BLD AUTO: 4.51 10*6/MM3 (ref 3.77–5.28)
WBC NRBC COR # BLD AUTO: 11.9 10*3/MM3 (ref 3.4–10.8)

## 2024-04-01 PROCEDURE — 36415 COLL VENOUS BLD VENIPUNCTURE: CPT | Performed by: NURSE PRACTITIONER

## 2024-04-01 PROCEDURE — 93971 EXTREMITY STUDY: CPT | Performed by: SURGERY

## 2024-04-01 PROCEDURE — 93971 EXTREMITY STUDY: CPT

## 2024-04-01 PROCEDURE — 99214 OFFICE O/P EST MOD 30 MIN: CPT | Performed by: NURSE PRACTITIONER

## 2024-04-01 PROCEDURE — 85025 COMPLETE CBC W/AUTO DIFF WBC: CPT | Performed by: NURSE PRACTITIONER

## 2024-04-01 RX ORDER — LOSARTAN POTASSIUM 25 MG/1
25 TABLET ORAL DAILY
Qty: 90 TABLET | Refills: 1 | Status: SHIPPED | OUTPATIENT
Start: 2024-04-01

## 2024-04-01 NOTE — PROGRESS NOTES
"Chief Complaint  left leg swelling  (Knee and down - purple spot )    Subjective        Eleanor Bonds presents to [facility]  History of Present Illness  Eleanor is a 48 year old female presenting today with complaints of swelling in her left lower leg.  She reports mild intermittent swelling of her legs on occasion, but yesterday she noticed her left lower leg was very swollen had a hard indented spot in the back of her calf.  She has a history of a DVT in the same area.  She has seen a vascular surgeon in the past due to her swelling.  She reports erythema to her leg.  She denies any fever or chills.  She reports pain with walking yesterday and today. She denies any injury to the area.      The following portions of the patient's history were reviewed and updated as appropriate: allergies, current medications, past family history, past medical history, past social history, past surgical history and problem list.    Allergies   Allergen Reactions    Aspirin GI Intolerance       Patient Active Problem List   Diagnosis    Cardiomyopathy    Deep vein thrombosis (DVT)    Dizziness    Dyspnea on exertion    Human papilloma virus (HPV) infection    Leukocytosis    Edema leg    Muscle weakness of lower extremity    Other dorsalgia    Low back pain    Papanicolaou smear of cervix with high grade squamous intraepithelial lesion (HGSIL)    Tobacco use    Urinary incontinence, mixed    Cholecystitis       Current Outpatient Medications   Medication Instructions    azithromycin (Zithromax Z-Brayden) 250 MG tablet Take 2 tablets by mouth on day 1, then 1 tablet daily on days 2-5    losartan (COZAAR) 25 mg, Oral, Daily    omeprazole (PRILOSEC) 20 mg, Oral, Daily PRN          Objective   Vital Signs:  /87 (BP Location: Left arm, Patient Position: Sitting, Cuff Size: Large Adult)   Pulse 70   Temp 97.8 °F (36.6 °C) (Temporal)   Ht 160 cm (62.99\")   Wt 79.7 kg (175 lb 9.6 oz)   SpO2 99%  " " BMI 31.11 kg/m²   Estimated body mass index is 31.11 kg/m² as calculated from the following:    Height as of this encounter: 160 cm (62.99\").    Weight as of this encounter: 79.7 kg (175 lb 9.6 oz).               Review of Systems   Constitutional:  Negative for activity change, chills, fatigue, fever and unexpected weight change.   Cardiovascular:  Positive for leg swelling.   Musculoskeletal:  Negative for arthralgias, back pain, gait problem, joint swelling, myalgias, neck pain and neck stiffness.   Skin:  Positive for color change.   Neurological:  Negative for weakness.        Physical Exam  Constitutional:       Appearance: Normal appearance.   Musculoskeletal:      Left lower leg: Swelling and tenderness present. 2+ Pitting Edema present.   Neurological:      Mental Status: She is alert.        Result Review :                   Assessment and Plan   Diagnoses and all orders for this visit:    1. Pain and swelling of left lower leg (Primary)  Comments:  Will get doppler to r/o DVT  CBC to r/o infection  may need antibiotic  Orders:  -     CBC & Differential  -     Duplex Venous Lower Extremity - Left CAR    Other orders  -     losartan (COZAAR) 25 MG tablet; Take 1 tablet by mouth Daily.  Dispense: 90 tablet; Refill: 1             Follow Up   No follow-ups on file.  Patient was given instructions and counseling regarding her condition or for health maintenance advice. Please see specific information pulled into the AVS if appropriate.       "

## 2024-04-02 ENCOUNTER — TELEPHONE (OUTPATIENT)
Dept: FAMILY MEDICINE CLINIC | Facility: CLINIC | Age: 49
End: 2024-04-02
Payer: COMMERCIAL

## 2024-04-02 RX ORDER — SULFAMETHOXAZOLE AND TRIMETHOPRIM 800; 160 MG/1; MG/1
1 TABLET ORAL 2 TIMES DAILY
Qty: 14 TABLET | Refills: 0 | Status: SHIPPED | OUTPATIENT
Start: 2024-04-02

## 2024-04-02 NOTE — TELEPHONE ENCOUNTER
Called patient to discuss doppler results and blood work. No new DVT.  It did show the chronic DVT in the common femoral and proximal femoral vein.  It also showed a deep venous valvular incompetence in the left popliteal. WBCs slightly elevated. Will start Bactrim DS for cellulitis. Pt follows up with vascular in August. Recommended that she call them to get sooner appointment. Continue to monitor and call if pain/swelling continues after antibiotic completion. Patient voiced understanding.

## 2024-04-09 ENCOUNTER — OFFICE VISIT (OUTPATIENT)
Age: 49
End: 2024-04-09
Payer: COMMERCIAL

## 2024-04-09 VITALS
BODY MASS INDEX: 31.01 KG/M2 | HEART RATE: 103 BPM | WEIGHT: 175 LBS | SYSTOLIC BLOOD PRESSURE: 148 MMHG | HEIGHT: 63 IN | DIASTOLIC BLOOD PRESSURE: 78 MMHG

## 2024-04-09 DIAGNOSIS — I82.429: Primary | ICD-10-CM

## 2024-04-09 NOTE — PROGRESS NOTES
"Chief Complaint  Leg Pain, Chronic Venous Insufficiency, and Deep Vein Thrombosis    Referral for left leg swelling and pain    Subjective        Eleanor Bonds presents to [facility]  History of Present Illness    Patient is a pleasant 48-year-old lady with a history of previous left leg DVT who was seen by her primary nurse practitioner for worsening left leg swelling and pain last week.    Patient reports persistent leg swelling at the end of the day, it acutely worsened a week ago.  This is now resolved and her leg is at its baseline level of swelling.  She reports some pain in the posterior aspect of her left lower leg.  She reports she wears compression socks on her left leg approximately 3 days/week.  These are over-the-counter compression socks that she got at [facility].    She also reports chronic sciatic nerve pain in her left leg.    She otherwise feels well with no acute complaints      Objective   Vital Signs:  /78 (BP Location: Left arm, Patient Position: Sitting)   Pulse 103   Ht 160 cm (63\")   Wt 79.4 kg (175 lb)   BMI 31.00 kg/m²   Estimated body mass index is 31 kg/m² as calculated from the following:    Height as of this encounter: 160 cm (63\").    Weight as of this encounter: 79.4 kg (175 lb).               Physical Exam     NAD  Palpable L DP pulse  Mild increased fullness in left leg relative to right.   Pt has photogragh of her left leg from last week that appears to show significant worsening swelling in left lower leg.       Result Review :                  I reviewed the images from her duplex scan.  She has chronic nonocclusive thrombus in the common femoral and proximal femoral veins with some reflux noted in the popliteal vein and femoral veins.    I reviewed the images from her CT abdomen pelvis 7/11/2022.  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  She has symptoms of venous reflux with swelling in her legs worse at the end of the day.  She does not have any wounds or sores on her lower extremities from this.  She has not really been compliant with compression socks wearing them only 3 days/week.  Patient has chronic issues with left leg pain and some numbness.  Discussed potential intervention for her left common iliac vein occlusion with the patient.  My concern is that standing this vein will result in significant lower back pain/pelvis pain, and with the numerous collaterals seen on her CT scan she very well may get just as good symptomatic control with compression socks.  Patient was given prescription for medical grade thigh-high compression socks to wear.  I will plan to see her back in 3 months to see how her symptoms are progressing.      Diagnoses and all orders for this visit:    1. Acquired occlusion of iliac vein (Primary)  Assessment & Plan:  Left common iliac vein, chronic.  Intermittent left lower extremity swelling.  Continuing with conservative management with medical grade compression socks for now.  Follow-up in 3 months.                     Follow Up     Return in about 3 months (around 7/9/2024).  Patient was given instructions and counseling regarding her condition or for health maintenance advice. Please see specific information pulled into the AVS if appropriate.

## 2024-04-09 NOTE — ASSESSMENT & PLAN NOTE
Left common iliac vein, chronic.  Intermittent left lower extremity swelling.  Continuing with conservative management with medical grade compression socks for now.  Follow-up in 3 months.

## 2024-04-19 DIAGNOSIS — M79.662 PAIN AND SWELLING OF LEFT LOWER LEG: Primary | ICD-10-CM

## 2024-04-19 DIAGNOSIS — M79.89 PAIN AND SWELLING OF LEFT LOWER LEG: Primary | ICD-10-CM

## 2024-04-22 ENCOUNTER — LAB (OUTPATIENT)
Dept: FAMILY MEDICINE CLINIC | Facility: CLINIC | Age: 49
End: 2024-04-22
Payer: COMMERCIAL

## 2024-04-22 PROCEDURE — 85025 COMPLETE CBC W/AUTO DIFF WBC: CPT | Performed by: NURSE PRACTITIONER

## 2024-04-23 LAB
BASOPHILS # BLD AUTO: 0.04 10*3/MM3 (ref 0–0.2)
BASOPHILS NFR BLD AUTO: 0.3 % (ref 0–1.5)
DEPRECATED RDW RBC AUTO: 38.9 FL (ref 37–54)
EOSINOPHIL # BLD AUTO: 0.26 10*3/MM3 (ref 0–0.4)
EOSINOPHIL NFR BLD AUTO: 2 % (ref 0.3–6.2)
ERYTHROCYTE [DISTWIDTH] IN BLOOD BY AUTOMATED COUNT: 12.5 % (ref 12.3–15.4)
HCT VFR BLD AUTO: 39.3 % (ref 34–46.6)
HGB BLD-MCNC: 12.7 G/DL (ref 12–15.9)
IMM GRANULOCYTES # BLD AUTO: 0.04 10*3/MM3 (ref 0–0.05)
IMM GRANULOCYTES NFR BLD AUTO: 0.3 % (ref 0–0.5)
LYMPHOCYTES # BLD AUTO: 3.6 10*3/MM3 (ref 0.7–3.1)
LYMPHOCYTES NFR BLD AUTO: 27.4 % (ref 19.6–45.3)
MCH RBC QN AUTO: 28 PG (ref 26.6–33)
MCHC RBC AUTO-ENTMCNC: 32.3 G/DL (ref 31.5–35.7)
MCV RBC AUTO: 86.6 FL (ref 79–97)
MONOCYTES # BLD AUTO: 0.67 10*3/MM3 (ref 0.1–0.9)
MONOCYTES NFR BLD AUTO: 5.1 % (ref 5–12)
NEUTROPHILS NFR BLD AUTO: 64.9 % (ref 42.7–76)
NEUTROPHILS NFR BLD AUTO: 8.54 10*3/MM3 (ref 1.7–7)
NRBC BLD AUTO-RTO: 0 /100 WBC (ref 0–0.2)
PLATELET # BLD AUTO: 356 10*3/MM3 (ref 140–450)
PMV BLD AUTO: 11.9 FL (ref 6–12)
RBC # BLD AUTO: 4.54 10*6/MM3 (ref 3.77–5.28)
WBC NRBC COR # BLD AUTO: 13.15 10*3/MM3 (ref 3.4–10.8)

## 2024-04-23 RX ORDER — CLINDAMYCIN HYDROCHLORIDE 300 MG/1
300 CAPSULE ORAL 3 TIMES DAILY
Qty: 21 CAPSULE | Refills: 0 | Status: SHIPPED | OUTPATIENT
Start: 2024-04-23

## 2024-04-30 ENCOUNTER — HOSPITAL ENCOUNTER (EMERGENCY)
Facility: HOSPITAL | Age: 49
Discharge: HOME | End: 2024-04-30
Attending: EMERGENCY MEDICINE
Payer: COMMERCIAL

## 2024-04-30 ENCOUNTER — APPOINTMENT (OUTPATIENT)
Dept: CT IMAGING | Facility: HOSPITAL | Age: 49
End: 2024-04-30
Payer: COMMERCIAL

## 2024-04-30 VITALS
RESPIRATION RATE: 19 BRPM | DIASTOLIC BLOOD PRESSURE: 71 MMHG | HEART RATE: 67 BPM | TEMPERATURE: 98.3 F | BODY MASS INDEX: 30.12 KG/M2 | SYSTOLIC BLOOD PRESSURE: 141 MMHG | OXYGEN SATURATION: 97 % | HEIGHT: 63 IN | WEIGHT: 170 LBS

## 2024-04-30 DIAGNOSIS — R07.9 CHEST PAIN, UNSPECIFIED TYPE: ICD-10-CM

## 2024-04-30 DIAGNOSIS — K21.9 GASTROESOPHAGEAL REFLUX DISEASE, UNSPECIFIED WHETHER ESOPHAGITIS PRESENT: Primary | ICD-10-CM

## 2024-04-30 LAB
ALBUMIN SERPL-MCNC: 4.3 G/DL (ref 3.5–5.2)
ALBUMIN/GLOB SERPL: 1.5 G/DL
ALP SERPL-CCNC: 79 U/L (ref 39–117)
ALT SERPL W P-5'-P-CCNC: 13 U/L (ref 1–33)
ANION GAP SERPL CALCULATED.3IONS-SCNC: 11 MMOL/L (ref 5–15)
AST SERPL-CCNC: 14 U/L (ref 1–32)
BASOPHILS # BLD AUTO: 0.05 10*3/MM3 (ref 0–0.2)
BASOPHILS NFR BLD AUTO: 0.2 % (ref 0–1.5)
BILIRUB SERPL-MCNC: 0.4 MG/DL (ref 0–1.2)
BUN SERPL-MCNC: 6 MG/DL (ref 6–20)
BUN/CREAT SERPL: 8.2 (ref 7–25)
CALCIUM SPEC-SCNC: 9.9 MG/DL (ref 8.6–10.5)
CHLORIDE SERPL-SCNC: 103 MMOL/L (ref 98–107)
CO2 SERPL-SCNC: 27 MMOL/L (ref 22–29)
CREAT SERPL-MCNC: 0.73 MG/DL (ref 0.57–1)
DEPRECATED RDW RBC AUTO: 41.3 FL (ref 37–54)
EGFRCR SERPLBLD CKD-EPI 2021: 101.6 ML/MIN/1.73
EOSINOPHIL # BLD AUTO: 0.1 10*3/MM3 (ref 0–0.4)
EOSINOPHIL NFR BLD AUTO: 0.5 % (ref 0.3–6.2)
ERYTHROCYTE [DISTWIDTH] IN BLOOD BY AUTOMATED COUNT: 13 % (ref 12.3–15.4)
GLOBULIN UR ELPH-MCNC: 2.8 GM/DL
GLUCOSE SERPL-MCNC: 115 MG/DL (ref 65–99)
HCT VFR BLD AUTO: 41.2 % (ref 34–46.6)
HGB BLD-MCNC: 13.6 G/DL (ref 12–15.9)
HOLD SPECIMEN: NORMAL
IMM GRANULOCYTES # BLD AUTO: 0.1 10*3/MM3 (ref 0–0.05)
IMM GRANULOCYTES NFR BLD AUTO: 0.5 % (ref 0–0.5)
LIPASE SERPL-CCNC: 23 U/L (ref 13–60)
LYMPHOCYTES # BLD AUTO: 2.46 10*3/MM3 (ref 0.7–3.1)
LYMPHOCYTES NFR BLD AUTO: 11.4 % (ref 19.6–45.3)
MCH RBC QN AUTO: 28.8 PG (ref 26.6–33)
MCHC RBC AUTO-ENTMCNC: 33 G/DL (ref 31.5–35.7)
MCV RBC AUTO: 87.3 FL (ref 79–97)
MONOCYTES # BLD AUTO: 1 10*3/MM3 (ref 0.1–0.9)
MONOCYTES NFR BLD AUTO: 4.6 % (ref 5–12)
NEUTROPHILS NFR BLD AUTO: 17.88 10*3/MM3 (ref 1.7–7)
NEUTROPHILS NFR BLD AUTO: 82.8 % (ref 42.7–76)
NRBC BLD AUTO-RTO: 0 /100 WBC (ref 0–0.2)
PLATELET # BLD AUTO: 337 10*3/MM3 (ref 140–450)
PMV BLD AUTO: 10.8 FL (ref 6–12)
POTASSIUM SERPL-SCNC: 3.8 MMOL/L (ref 3.5–5.2)
PROT SERPL-MCNC: 7.1 G/DL (ref 6–8.5)
RBC # BLD AUTO: 4.72 10*6/MM3 (ref 3.77–5.28)
SODIUM SERPL-SCNC: 141 MMOL/L (ref 136–145)
TROPONIN T SERPL HS-MCNC: <6 NG/L
WBC NRBC COR # BLD AUTO: 21.59 10*3/MM3 (ref 3.4–10.8)
WHOLE BLOOD HOLD COAG: NORMAL

## 2024-04-30 PROCEDURE — 80053 COMPREHEN METABOLIC PANEL: CPT | Performed by: EMERGENCY MEDICINE

## 2024-04-30 PROCEDURE — 85025 COMPLETE CBC W/AUTO DIFF WBC: CPT | Performed by: EMERGENCY MEDICINE

## 2024-04-30 PROCEDURE — 93005 ELECTROCARDIOGRAM TRACING: CPT | Performed by: EMERGENCY MEDICINE

## 2024-04-30 PROCEDURE — 25510000001 IOPAMIDOL PER 1 ML: Performed by: EMERGENCY MEDICINE

## 2024-04-30 PROCEDURE — 74177 CT ABD & PELVIS W/CONTRAST: CPT

## 2024-04-30 PROCEDURE — 99285 EMERGENCY DEPT VISIT HI MDM: CPT

## 2024-04-30 PROCEDURE — 93005 ELECTROCARDIOGRAM TRACING: CPT

## 2024-04-30 PROCEDURE — 96375 TX/PRO/DX INJ NEW DRUG ADDON: CPT

## 2024-04-30 PROCEDURE — 25010000002 ONDANSETRON PER 1 MG: Performed by: EMERGENCY MEDICINE

## 2024-04-30 PROCEDURE — 84484 ASSAY OF TROPONIN QUANT: CPT | Performed by: EMERGENCY MEDICINE

## 2024-04-30 PROCEDURE — 25010000002 HYDROMORPHONE 1 MG/ML SOLUTION: Performed by: EMERGENCY MEDICINE

## 2024-04-30 PROCEDURE — 83690 ASSAY OF LIPASE: CPT | Performed by: EMERGENCY MEDICINE

## 2024-04-30 PROCEDURE — 96374 THER/PROPH/DIAG INJ IV PUSH: CPT

## 2024-04-30 RX ORDER — SUCRALFATE 1 G/1
1 TABLET ORAL 4 TIMES DAILY
Qty: 120 TABLET | Refills: 0 | Status: SHIPPED | OUTPATIENT
Start: 2024-04-30

## 2024-04-30 RX ORDER — ALUMINA, MAGNESIA, AND SIMETHICONE 2400; 2400; 240 MG/30ML; MG/30ML; MG/30ML
15 SUSPENSION ORAL ONCE
Status: COMPLETED | OUTPATIENT
Start: 2024-04-30 | End: 2024-04-30

## 2024-04-30 RX ORDER — SODIUM CHLORIDE 0.9 % (FLUSH) 0.9 %
10 SYRINGE (ML) INJECTION AS NEEDED
Status: DISCONTINUED | OUTPATIENT
Start: 2024-04-30 | End: 2024-04-30 | Stop reason: HOSPADM

## 2024-04-30 RX ORDER — ONDANSETRON 2 MG/ML
4 INJECTION INTRAMUSCULAR; INTRAVENOUS ONCE
Status: COMPLETED | OUTPATIENT
Start: 2024-04-30 | End: 2024-04-30

## 2024-04-30 RX ADMIN — HYDROMORPHONE HYDROCHLORIDE 1 MG: 1 INJECTION, SOLUTION INTRAMUSCULAR; INTRAVENOUS; SUBCUTANEOUS at 14:05

## 2024-04-30 RX ADMIN — Medication 10 ML: at 13:00

## 2024-04-30 RX ADMIN — ALUMINUM HYDROXIDE, MAGNESIUM HYDROXIDE, AND DIMETHICONE 15 ML: 400; 400; 40 SUSPENSION ORAL at 13:00

## 2024-04-30 RX ADMIN — ONDANSETRON 4 MG: 2 INJECTION INTRAMUSCULAR; INTRAVENOUS at 12:56

## 2024-04-30 RX ADMIN — IOPAMIDOL 100 ML: 755 INJECTION, SOLUTION INTRAVENOUS at 15:08

## 2024-04-30 NOTE — Clinical Note
[facility] EMERGENCY DEPARTMENT  [address] [place] [address]  Phone: [phone]    Eleanor Bonds was seen and treated in our emergency department on 4/30/2024.  She may return to work on 05/02/2024.         Thank you for choosing [facility].    Alberto Rooney MD

## 2024-04-30 NOTE — DISCHARGE INSTRUCTIONS
Follow-up with gastroenterology as directed.  Return to the emergency room for any new or worsening symptoms or if you have any other questions or concerns.  Take medication as prescribed.

## 2024-04-30 NOTE — ED PROVIDER NOTES
Subjective   History of Present Illness  Chief complaint: Heartburn    48-year-old female presents with heartburn.  Patient states she has a history of GERD and takes Prilosec.  She states the heartburn became bad last night.  She has a lot of burning throughout the center of her chest.  She states she can feel it when she burps.  She has also had nausea and vomiting through the night.  She took some Tums as well as her Prilosec this morning with no improvement.  She called her primary doctor who recommended she come to the emergency room since she was having chest pain.  She reports minimal shortness of breath.  She has had no diaphoresis, dizziness, palpitations.  She denies any alleviating or exacerbating factors.    History provided by:  Patient      Review of Systems   Constitutional:  Negative for fever.   HENT:  Negative for congestion.    Respiratory:  Positive for shortness of breath. Negative for cough.    Cardiovascular:  Positive for chest pain.   Gastrointestinal:  Positive for nausea and vomiting. Negative for abdominal pain.   Musculoskeletal:  Negative for back pain.   Neurological:  Negative for headaches.   Psychiatric/Behavioral:  Negative for confusion.        Past Medical History:   Diagnosis Date    Arthritis of spine     Cervical cancer     GERD (gastroesophageal reflux disease)     Hypertension     Kidney calculi     Kidney cysts     Low back pain     previous back surgery    May-Thurner syndrome     to be ruled out seeing specialist       Allergies   Allergen Reactions    Aspirin GI Intolerance       Past Surgical History:   Procedure Laterality Date    BACK SURGERY      CHOLECYSTECTOMY N/A 7/11/2022    Procedure: CHOLECYSTECTOMY LAPAROSCOPIC WITH DAVINCI ROBOT;  Surgeon: Clarence Shanks MD;  Location: [facility] MAIN OR;  Service: Robotics - DaVinci;  Laterality: N/A;    COLONOSCOPY N/A 4/28/2023    Procedure: COLONOSCOPY WITH POLYPECTOMY X4;  Surgeon: NICK Montejo MD;  Location: [facility]  "ENDOSCOPY;  Service: Gastroenterology;  Laterality: N/A;  Post: COLON POLYPS    CYSTOSCOPY W/ URETERAL STENT PLACEMENT Right 8/8/2021    Procedure: CYSTOSCOPY, RIGHT URETEROSCOPY, STONE EXTRACTION, RETROGRADE AND STENT PLACEMENT RIGHT URETER (NO STRING);  Surgeon: Jose Maria Mcelroy MD;  Location: [facility] OR;  Service: Urology;  Laterality: Right;    HYSTERECTOMY  12/2020       Family History   Problem Relation Age of Onset    Diabetes Mother     Hyperlipidemia Mother     Hypertension Mother     Arthritis Mother     Heart disease Father     Hyperlipidemia Father     Hypertension Father     COPD Father     Emphysema Father        Social History     Socioeconomic History    Marital status:    Tobacco Use    Smoking status: Former    Smokeless tobacco: Never   Vaping Use    Vaping status: Never Used   Substance and Sexual Activity    Alcohol use: Yes     Comment: occ    Drug use: No    Sexual activity: Defer       /70 (BP Location: Right arm, Patient Position: Sitting)   Pulse 50   Temp 98.3 °F (36.8 °C) (Oral)   Resp 12   Ht 160 cm (63\")   Wt 77.1 kg (170 lb)   LMP 03/03/2020   SpO2 100%   BMI 30.11 kg/m²       Objective   Physical Exam  Vitals and nursing note reviewed.   Constitutional:       Appearance: Normal appearance.   HENT:      Head: Normocephalic and atraumatic.      Mouth/Throat:      Mouth: Mucous membranes are moist.   Cardiovascular:      Rate and Rhythm: Normal rate and regular rhythm.      Heart sounds: Normal heart sounds.   Pulmonary:      Effort: Pulmonary effort is normal. No respiratory distress.      Breath sounds: Normal breath sounds.   Abdominal:      General: Bowel sounds are normal.      Palpations: Abdomen is soft.      Tenderness: There is no abdominal tenderness.   Musculoskeletal:      Right lower leg: No edema.      Left lower leg: No edema.   Skin:     General: Skin is warm and dry.   Neurological:      Mental Status: She is alert and oriented to person, " place, and time.         Procedures           ED Course      My interpretation of EKG shows sinus rhythm, rate of 64, no ST elevation                           Results for orders placed or performed during the hospital encounter of 04/30/24   Comprehensive Metabolic Panel    Specimen: Blood   Result Value Ref Range    Glucose 115 (H) 65 - 99 mg/dL    BUN 6 6 - 20 mg/dL    Creatinine 0.73 0.57 - 1.00 mg/dL    Sodium 141 136 - 145 mmol/L    Potassium 3.8 3.5 - 5.2 mmol/L    Chloride 103 98 - 107 mmol/L    CO2 27.0 22.0 - 29.0 mmol/L    Calcium 9.9 8.6 - 10.5 mg/dL    Total Protein 7.1 6.0 - 8.5 g/dL    Albumin 4.3 3.5 - 5.2 g/dL    ALT (SGPT) 13 1 - 33 U/L    AST (SGOT) 14 1 - 32 U/L    Alkaline Phosphatase 79 39 - 117 U/L    Total Bilirubin 0.4 0.0 - 1.2 mg/dL    Globulin 2.8 gm/dL    A/G Ratio 1.5 g/dL    BUN/Creatinine Ratio 8.2 7.0 - 25.0    Anion Gap 11.0 5.0 - 15.0 mmol/L    eGFR 101.6 >60.0 mL/min/1.73   Lipase    Specimen: Blood   Result Value Ref Range    Lipase 23 13 - 60 U/L   Single High Sensitivity Troponin T    Specimen: Blood   Result Value Ref Range    HS Troponin T <6 <14 ng/L   CBC Auto Differential    Specimen: Blood   Result Value Ref Range    WBC 21.59 (H) 3.40 - 10.80 10*3/mm3    RBC 4.72 3.77 - 5.28 10*6/mm3    Hemoglobin 13.6 12.0 - 15.9 g/dL    Hematocrit 41.2 34.0 - 46.6 %    MCV 87.3 79.0 - 97.0 fL    MCH 28.8 26.6 - 33.0 pg    MCHC 33.0 31.5 - 35.7 g/dL    RDW 13.0 12.3 - 15.4 %    RDW-SD 41.3 37.0 - 54.0 fl    MPV 10.8 6.0 - 12.0 fL    Platelets 337 140 - 450 10*3/mm3    Neutrophil % 82.8 (H) 42.7 - 76.0 %    Lymphocyte % 11.4 (L) 19.6 - 45.3 %    Monocyte % 4.6 (L) 5.0 - 12.0 %    Eosinophil % 0.5 0.3 - 6.2 %    Basophil % 0.2 0.0 - 1.5 %    Immature Grans % 0.5 0.0 - 0.5 %    Neutrophils, Absolute 17.88 (H) 1.70 - 7.00 10*3/mm3    Lymphocytes, Absolute 2.46 0.70 - 3.10 10*3/mm3    Monocytes, Absolute 1.00 (H) 0.10 - 0.90 10*3/mm3    Eosinophils, Absolute 0.10 0.00 - 0.40 10*3/mm3     Basophils, Absolute 0.05 0.00 - 0.20 10*3/mm3    Immature Grans, Absolute 0.10 (H) 0.00 - 0.05 10*3/mm3    nRBC 0.0 0.0 - 0.2 /100 WBC   ECG 12 Lead Other; epigastric pain   Result Value Ref Range    QT Interval 439 ms    QTC Interval 452 ms   Gold Top - SST   Result Value Ref Range    Extra Tube Hold for add-ons.    Light Blue Top   Result Value Ref Range    Extra Tube Hold for add-ons.      CT Abdomen Pelvis With Contrast    Result Date: 4/30/2024  Impression: 1. No acute CT abnormalities in the chest, abdomen or pelvis 2. Stable chronic nonemergent findings as above Electronically Signed: Contreras Robbins MD  4/30/2024 3:25 PM EDT  Workstation ID: [id number]               Medical Decision Making  Amount and/or Complexity of Data Reviewed  Labs: ordered.  Radiology: ordered.  ECG/medicine tests: ordered.    Risk  OTC drugs.  Prescription drug management.      Patient had the above evaluation.  Results were discussed with the patient.  White blood cell count was elevated at 21.59.  Patient states her white blood cell count has been running a little high recently and her doctor has been following this.  She also had nausea and vomiting overnight which could have caused her white count to elevate.  CMP and lipase are unremarkable.  EKG shows no acute ischemia.  Troponin is negative.  CT of the abdomen pelvis showed no acute abnormality.  Pain seems to be GI in nature.  I discussed admission for further workup but patient has declined.  She would like to be discharged to follow-up on an outpatient basis.  She will be given gastroenterology follow-up.  She already takes a PPI.  She will be prescribed Carafate.  She was instructed to return for new or worsening symptoms.      Final diagnoses:   Gastroesophageal reflux disease, unspecified whether esophagitis present   Chest pain, unspecified type       ED Disposition  ED Disposition       ED Disposition   Discharge    Condition   Stable    Comment   --                Abdon Zhang MD  [address] [facility] [place] [address] [phone]    Call in 1 day           Medication List        New Prescriptions      sucralfate 1 g tablet  Commonly known as: CARAFATE  Take 1 tablet by mouth 4 (Four) Times a Day.               Where to Get Your Medications        [id number] - [place], [place] - [address] AT [address] - [phone]  - [phone]   [address] [place] [address]      Phone: [phone]   sucralfate 1 g tablet            Alberto Rooney MD  04/30/24 [id number]

## 2024-05-01 LAB
QT INTERVAL: 439 MS
QTC INTERVAL: 452 MS

## 2024-05-02 DIAGNOSIS — D72.829 LEUKOCYTOSIS, UNSPECIFIED TYPE: Primary | ICD-10-CM

## 2024-05-10 ENCOUNTER — CONSULT (OUTPATIENT)
Dept: ONCOLOGY | Facility: CLINIC | Age: 49
End: 2024-05-10
Payer: COMMERCIAL

## 2024-05-10 ENCOUNTER — OFFICE (AMBULATORY)
Dept: URBAN - METROPOLITAN AREA CLINIC 64 | Facility: CLINIC | Age: 49
End: 2024-05-10

## 2024-05-10 ENCOUNTER — LAB (OUTPATIENT)
Dept: LAB | Facility: HOSPITAL | Age: 49
End: 2024-05-10
Payer: COMMERCIAL

## 2024-05-10 VITALS
HEIGHT: 63 IN | HEART RATE: 59 BPM | WEIGHT: 171 LBS | DIASTOLIC BLOOD PRESSURE: 85 MMHG | SYSTOLIC BLOOD PRESSURE: 159 MMHG

## 2024-05-10 VITALS
SYSTOLIC BLOOD PRESSURE: 148 MMHG | OXYGEN SATURATION: 99 % | WEIGHT: 171 LBS | DIASTOLIC BLOOD PRESSURE: 85 MMHG | BODY MASS INDEX: 30.29 KG/M2 | HEART RATE: 57 BPM

## 2024-05-10 DIAGNOSIS — R19.8 OTHER SPECIFIED SYMPTOMS AND SIGNS INVOLVING THE DIGESTIVE S: ICD-10-CM

## 2024-05-10 DIAGNOSIS — K21.9 GASTRO-ESOPHAGEAL REFLUX DISEASE WITHOUT ESOPHAGITIS: ICD-10-CM

## 2024-05-10 DIAGNOSIS — D72.828 ACQUIRED NEUTROPHILIA: ICD-10-CM

## 2024-05-10 DIAGNOSIS — I82.512 CHRONIC DEEP VEIN THROMBOSIS (DVT) OF FEMORAL VEIN OF LEFT LOWER EXTREMITY: ICD-10-CM

## 2024-05-10 DIAGNOSIS — I87.1 MAY-THURNER SYNDROME: ICD-10-CM

## 2024-05-10 DIAGNOSIS — D72.828 ACQUIRED NEUTROPHILIA: Primary | ICD-10-CM

## 2024-05-10 DIAGNOSIS — N87.9 CERVICAL INTRAEPITHELIAL NEOPLASIA (CIN): ICD-10-CM

## 2024-05-10 LAB
ALBUMIN SERPL-MCNC: 4.7 G/DL (ref 3.5–5.2)
ALBUMIN/GLOB SERPL: 1.6 G/DL
ALP SERPL-CCNC: 75 U/L (ref 39–117)
ALT SERPL W P-5'-P-CCNC: 15 U/L (ref 1–33)
ANION GAP SERPL CALCULATED.3IONS-SCNC: 11 MMOL/L (ref 5–15)
AST SERPL-CCNC: 15 U/L (ref 1–32)
BASOPHILS # BLD AUTO: 0.02 10*3/MM3 (ref 0–0.2)
BASOPHILS NFR BLD AUTO: 0.2 % (ref 0–1.5)
BILIRUB SERPL-MCNC: 0.4 MG/DL (ref 0–1.2)
BUN SERPL-MCNC: 8 MG/DL (ref 6–20)
BUN/CREAT SERPL: 11.9 (ref 7–25)
CALCIUM SPEC-SCNC: 9.2 MG/DL (ref 8.6–10.5)
CHLORIDE SERPL-SCNC: 103 MMOL/L (ref 98–107)
CO2 SERPL-SCNC: 26 MMOL/L (ref 22–29)
CREAT SERPL-MCNC: 0.67 MG/DL (ref 0.57–1)
CRP SERPL-MCNC: 0.6 MG/DL (ref 0–0.5)
DEPRECATED RDW RBC AUTO: 44.1 FL (ref 37–54)
EGFRCR SERPLBLD CKD-EPI 2021: 108 ML/MIN/1.73
EOSINOPHIL # BLD AUTO: 0.25 10*3/MM3 (ref 0–0.4)
EOSINOPHIL NFR BLD AUTO: 2.1 % (ref 0.3–6.2)
ERYTHROCYTE [DISTWIDTH] IN BLOOD BY AUTOMATED COUNT: 13.6 % (ref 12.3–15.4)
ERYTHROCYTE [SEDIMENTATION RATE] IN BLOOD: 5 MM/HR (ref 0–20)
FERRITIN SERPL-MCNC: 236.6 NG/ML (ref 13–150)
FOLATE SERPL-MCNC: 15.1 NG/ML (ref 4.78–24.2)
GLOBULIN UR ELPH-MCNC: 3 GM/DL
GLUCOSE SERPL-MCNC: 103 MG/DL (ref 65–99)
HCT VFR BLD AUTO: 46.2 % (ref 34–46.6)
HGB BLD-MCNC: 15 G/DL (ref 12–15.9)
IRON 24H UR-MRATE: 59 MCG/DL (ref 37–145)
IRON SATN MFR SERPL: 18 % (ref 20–50)
LDH SERPL-CCNC: 179 U/L (ref 135–214)
LYMPHOCYTES # BLD AUTO: 3.82 10*3/MM3 (ref 0.7–3.1)
LYMPHOCYTES NFR BLD AUTO: 31.7 % (ref 19.6–45.3)
MCH RBC QN AUTO: 29.4 PG (ref 26.6–33)
MCHC RBC AUTO-ENTMCNC: 32.5 G/DL (ref 31.5–35.7)
MCV RBC AUTO: 90.4 FL (ref 79–97)
MONOCYTES # BLD AUTO: 0.69 10*3/MM3 (ref 0.1–0.9)
MONOCYTES NFR BLD AUTO: 5.7 % (ref 5–12)
NEUTROPHILS NFR BLD AUTO: 60.3 % (ref 42.7–76)
NEUTROPHILS NFR BLD AUTO: 7.28 10*3/MM3 (ref 1.7–7)
PLATELET # BLD AUTO: 361 10*3/MM3 (ref 140–450)
PMV BLD AUTO: 10.9 FL (ref 6–12)
POTASSIUM SERPL-SCNC: 4.3 MMOL/L (ref 3.5–5.2)
PROT SERPL-MCNC: 7.7 G/DL (ref 6–8.5)
RBC # BLD AUTO: 5.11 10*6/MM3 (ref 3.77–5.28)
RETICS # AUTO: 0.07 10*6/MM3 (ref 0.02–0.13)
RETICS/RBC NFR AUTO: 1.36 % (ref 0.7–1.9)
SODIUM SERPL-SCNC: 140 MMOL/L (ref 136–145)
TIBC SERPL-MCNC: 329 MCG/DL (ref 298–536)
TRANSFERRIN SERPL-MCNC: 221 MG/DL (ref 200–360)
VIT B12 BLD-MCNC: 315 PG/ML (ref 211–946)
WBC NRBC COR # BLD AUTO: 12.06 10*3/MM3 (ref 3.4–10.8)

## 2024-05-10 PROCEDURE — 36415 COLL VENOUS BLD VENIPUNCTURE: CPT

## 2024-05-10 PROCEDURE — 85045 AUTOMATED RETICULOCYTE COUNT: CPT | Performed by: STUDENT IN AN ORGANIZED HEALTH CARE EDUCATION/TRAINING PROGRAM

## 2024-05-10 PROCEDURE — 82746 ASSAY OF FOLIC ACID SERUM: CPT | Performed by: STUDENT IN AN ORGANIZED HEALTH CARE EDUCATION/TRAINING PROGRAM

## 2024-05-10 PROCEDURE — 80053 COMPREHEN METABOLIC PANEL: CPT | Performed by: STUDENT IN AN ORGANIZED HEALTH CARE EDUCATION/TRAINING PROGRAM

## 2024-05-10 PROCEDURE — 82607 VITAMIN B-12: CPT | Performed by: STUDENT IN AN ORGANIZED HEALTH CARE EDUCATION/TRAINING PROGRAM

## 2024-05-10 PROCEDURE — 86140 C-REACTIVE PROTEIN: CPT | Performed by: STUDENT IN AN ORGANIZED HEALTH CARE EDUCATION/TRAINING PROGRAM

## 2024-05-10 PROCEDURE — 83615 LACTATE (LD) (LDH) ENZYME: CPT | Performed by: STUDENT IN AN ORGANIZED HEALTH CARE EDUCATION/TRAINING PROGRAM

## 2024-05-10 PROCEDURE — 99214 OFFICE O/P EST MOD 30 MIN: CPT | Performed by: NURSE PRACTITIONER

## 2024-05-10 PROCEDURE — 85025 COMPLETE CBC W/AUTO DIFF WBC: CPT

## 2024-05-10 PROCEDURE — 84466 ASSAY OF TRANSFERRIN: CPT | Performed by: STUDENT IN AN ORGANIZED HEALTH CARE EDUCATION/TRAINING PROGRAM

## 2024-05-10 PROCEDURE — 85652 RBC SED RATE AUTOMATED: CPT | Performed by: STUDENT IN AN ORGANIZED HEALTH CARE EDUCATION/TRAINING PROGRAM

## 2024-05-10 PROCEDURE — 82728 ASSAY OF FERRITIN: CPT | Performed by: STUDENT IN AN ORGANIZED HEALTH CARE EDUCATION/TRAINING PROGRAM

## 2024-05-10 PROCEDURE — 83540 ASSAY OF IRON: CPT | Performed by: STUDENT IN AN ORGANIZED HEALTH CARE EDUCATION/TRAINING PROGRAM

## 2024-05-10 RX ORDER — SUCRALFATE 1 G/1
4 TABLET ORAL
Qty: 120 | Refills: 1 | Status: ACTIVE
Start: 2024-05-10

## 2024-05-10 RX ORDER — OMEPRAZOLE 40 MG/1
40 CAPSULE, DELAYED RELEASE ORAL
Qty: 90 | Refills: 3 | Status: ACTIVE
Start: 2024-05-10

## 2024-05-13 ENCOUNTER — PATIENT ROUNDING (BHMG ONLY) (OUTPATIENT)
Dept: ONCOLOGY | Facility: CLINIC | Age: 49
End: 2024-05-13
Payer: COMMERCIAL

## 2024-05-13 NOTE — PROGRESS NOTES
May 13, 2024    Hello, may I speak with Eleanor Bonds?    My name is Jany Mireles      I am  with [facility]  [address] [place] [address] [phone].    Before we get started may I verify your date of birth? 1975    I am calling to officially welcome you to our practice and ask about your recent visit. Is this a good time to talk? no    Tell me about your visit with us. What things went well?  A My Chart message was sent to the patient.         We're always looking for ways to make our patients' experiences even better. Do you have recommendations on ways we may improve?  no    Overall were you satisfied with your first visit to our practice? yes       I appreciate you taking the time to speak with me today. Is there anything else I can do for you? no      Thank you, and have a great day.

## 2024-05-14 LAB
ANA SER QL IF: NEGATIVE
Lab: NORMAL

## 2024-05-15 LAB — REF LAB TEST METHOD: NORMAL

## 2024-05-16 LAB
INTERPRETATION: NEGATIVE
LAB DIRECTOR NAME PROVIDER: NORMAL
LABORATORY COMMENT REPORT: NORMAL
REF LAB TEST METHOD: NORMAL
T(ABL1,BCR)B2A2/CONTROL BLD/T: NORMAL %
T(ABL1,BCR)B3A2/CONTROL BLD/T: NORMAL %
T(ABL1,BCR)E1A2/CONTROL BLD/T: NORMAL %

## 2024-05-19 LAB
JAK2 P.V617F BLD/T QL: NORMAL
LAB DIRECTOR NAME PROVIDER: NORMAL
LABORATORY COMMENT REPORT: NORMAL

## 2024-05-31 NOTE — PROGRESS NOTES
HEMATOLOGY ONCOLOGY OUTPATIENT FOLLOW  UP       Patient name: Eleanor Bonds  : 1975  MRN: [id number]  Primary Care Physician: Lizz Moraes APRN  Referring Physician: Lizz Moraes APRN  Reason For Consult:       History of Present Illness:  Patient is a 48 y.o. female who has been referred to us for further evaluation and management of leukocytosis.  Most recent labs were reviewed as follows    2024:  CBC: 21.59/13.6/41.2/337 [ANC 17.88, lymphocyte count 2.46, monocyte 1K]  CMP is unremarkable.    On review of labs, the patient appears to have chronic neutrophilic leukocytosis going back to , likely beyond that.  There has been an uptick in the WBC count since 2020 until 2022.  With peak WBC count 26,000 in 2022.  Differential count showed neutrophilic predominance with left shift.  Patient reported that she was treated with oral antibiotics for suspected cellulitis of left lower extremity about 2 weeks ago due to noted skin discoloration, however the symptoms have not changed much.    She has reported history of left lower extremity DVT about 20 years ago following her pregnancy for which she was on warfarin for approximately 1 year.  Recent left lower extremity ultrasound on 2024 is suggestive of chronic DVT involving common femoral and proximal femoral veins.  Also noted to have deep venous valvular incompetence in the left popliteal vein.  No acute DVT was noted on the study.    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  As per patient, there are no indication for any surgical intervention at this time.  His CT abdomen pelvis on 2024 showed persistent changes of left iliac vein being compressed against the lumbar spine with the right and left leg arteries along with prominence of the pubic veins.    She reported having history of stage IA1  Cervical cancer for which she is under surveillance with [facility].  She reported undergoing annual CT scan/PET scan for the same.    Smoking: quit 7 years ago, history of smoking 2 PPD prior to that.    She reported being up-to-date with her age-appropriate cancer screening and prior screening colonoscopy.    5/10/24:   Patient presents for initial consultation today.  Pertinent medical history summarized as above.  She denied any signs and symptoms concerning for acute infection [upper respiratory/LRTI GI or  infection].  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    Subjective:  Pt seen today to follow up on lab results. She reported having symptoms of heartburn, weight loss (around 10lb) and is planned for an EGD later this week for further evaluation.   Reported having chronic hip and knee arthritis, which remains unchanged.      Past Medical History:   Diagnosis Date    Arthritis of spine     Cervical cancer     GERD (gastroesophageal reflux disease)     Hypertension     Kidney calculi     Kidney cysts     Low back pain     previous back surgery    May-Thurner syndrome     to be ruled out seeing specialist       Past Surgical History:   Procedure Laterality Date    BACK SURGERY      CHOLECYSTECTOMY N/A 7/11/2022    Procedure: CHOLECYSTECTOMY LAPAROSCOPIC WITH DAVINCI ROBOT;  Surgeon: Clarence Shanks MD;  Location: [facility] MAIN OR;  Service: Robotics - DaVinci;  Laterality: N/A;    COLONOSCOPY N/A 4/28/2023    Procedure: COLONOSCOPY WITH POLYPECTOMY X4;  Surgeon: NICK Montejo MD;  Location: [facility] ENDOSCOPY;  Service: Gastroenterology;  Laterality: N/A;  Post: COLON POLYPS    CYSTOSCOPY W/ URETERAL STENT PLACEMENT Right 8/8/2021    Procedure: CYSTOSCOPY, RIGHT URETEROSCOPY, STONE EXTRACTION, RETROGRADE AND STENT PLACEMENT RIGHT URETER (NO STRING);  Surgeon: Jose Maria Mcelroy MD;  Location: [facility] MAIN OR;  Service: Urology;  Laterality: Right;     "HYSTERECTOMY  12/2020         Current Outpatient Medications:     losartan (COZAAR) 25 MG tablet, Take 1 tablet by mouth Daily., Disp: 90 tablet, Rfl: 1    omeprazole (priLOSEC) 20 MG capsule, Take 1 capsule by mouth Daily As Needed., Disp: , Rfl:     sucralfate (CARAFATE) 1 g tablet, Take 1 tablet by mouth 4 (Four) Times a Day. (Patient not taking: Reported on 5/10/2024), Disp: 120 tablet, Rfl: 0    Allergies   Allergen Reactions    Aspirin GI Intolerance       Family History   Problem Relation Age of Onset    Diabetes Mother     Hyperlipidemia Mother     Hypertension Mother     Arthritis Mother     Heart disease Father     Hyperlipidemia Father     Hypertension Father     COPD Father     Emphysema Father        Cancer-related family history is not on file.      Social History     Tobacco Use    Smoking status: Former    Smokeless tobacco: Never   Vaping Use    Vaping status: Never Used   Substance Use Topics    Alcohol use: Yes     Comment: occ    Drug use: No     Social History     Social History Narrative    Not on file       ROS:   Review of Systems   Constitutional:  Positive for fatigue and unexpected weight change.   HENT: Negative.     Eyes: Negative.    Respiratory: Negative.     Cardiovascular: Negative.    Gastrointestinal: Negative.    Endocrine: Negative.    Genitourinary: Negative.    Musculoskeletal: Negative.    Skin: Negative.    Allergic/Immunologic: Negative.    Neurological: Negative.    Hematological: Negative.    Psychiatric/Behavioral: Negative.           Objective:    Vital Signs:  Vitals:    06/05/24 0852   BP: 162/98   Pulse: 73   SpO2: 100%   Weight: 76.1 kg (167 lb 12.8 oz)   Height: 160 cm (63\")   PainSc: 0-No pain       Body mass index is 29.72 kg/m².    ECOG  (0) Fully active, able to carry on all predisease performance without restriction    Physical Exam:   Physical Exam  Constitutional:       Appearance: Normal appearance. She is normal weight.   HENT:      Head: Normocephalic and " atraumatic.      Right Ear: External ear normal.      Left Ear: External ear normal.      Nose: Nose normal.      Mouth/Throat:      Mouth: Mucous membranes are moist.      Pharynx: Oropharynx is clear.   Eyes:      Extraocular Movements: Extraocular movements intact.      Conjunctiva/sclera: Conjunctivae normal.      Pupils: Pupils are equal, round, and reactive to light.   Cardiovascular:      Rate and Rhythm: Normal rate.      Pulses: Normal pulses.   Pulmonary:      Effort: Pulmonary effort is normal.   Abdominal:      General: Abdomen is flat.      Palpations: Abdomen is soft.   Musculoskeletal:         General: Normal range of motion.      Cervical back: Normal range of motion and neck supple.   Skin:     General: Skin is warm.   Neurological:      Mental Status: She is alert.   Psychiatric:         Mood and Affect: Mood normal.         Behavior: Behavior normal.         Thought Content: Thought content normal.         Judgment: Judgment normal.         Lab Results - Last 18 Months   Lab Units 05/10/24  1419 04/30/24  1300 04/22/24  1609   WBC 10*3/mm3 12.06* 21.59* 13.15*   HEMOGLOBIN g/dL 15.0 13.6 12.7   HEMATOCRIT % 46.2 41.2 39.3   PLATELETS 10*3/mm3 361 337 356   MCV fL 90.4 87.3 86.6     Lab Results - Last 18 Months   Lab Units 05/10/24  1419 04/30/24  1300 04/12/23  1344   SODIUM mmol/L 140 141 138   POTASSIUM mmol/L 4.3 3.8 3.4*   CHLORIDE mmol/L 103 103 102   CO2 mmol/L 26.0 27.0 26.5   BUN mg/dL 8 6 9   CREATININE mg/dL 0.67 0.73 0.65   CALCIUM mg/dL 9.2 9.9 8.8   BILIRUBIN mg/dL 0.4 0.4  --    ALK PHOS U/L 75 79  --    ALT (SGPT) U/L 15 13  --    AST (SGOT) U/L 15 14  --    GLUCOSE mg/dL 103* 115* 101*       Lab Results   Component Value Date    GLUCOSE 103 (H) 05/10/2024    BUN 8 05/10/2024    CREATININE 0.67 05/10/2024    EGFRIFNONA 95 08/09/2021    BCR 11.9 05/10/2024    K 4.3 05/10/2024    CO2 26.0 05/10/2024    CALCIUM 9.2 05/10/2024    ALBUMIN 4.7 05/10/2024    AST 15 05/10/2024    ALT 15  "05/10/2024       No results for input(s): \"APTT\", \"INR\", \"PTT\" in the last 11178 hours.    Lab Results   Component Value Date    IRON 59 05/10/2024    TIBC 329 05/10/2024    FERRITIN 236.60 (H) 05/10/2024       Lab Results   Component Value Date    FOLATE 15.10 05/10/2024     Lab Results   Component Value Date    RETICCTPCT 1.36 05/10/2024     Lab Results   Component Value Date    EXEINYSC35 315 05/10/2024     Lab Results   Component Value Date    PTT 29.7 07/11/2022    INR 0.97 07/11/2022     Lab Results   Component Value Date    SEDRATE 5 05/10/2024          Assessment & Plan :    Chronic neutrophilic leukocytosis:  -Discussed with patient regarding various differential diagnosis for neutrophilia including chronic infectious/inflammatory/autoimmune disorders, secondary to underlying medical comorbidities, smoking, obesity and likely primary hematologic disorder such as MPN/CML etc.  -Repeat CBC showed improvement in WBC count to 12.0 and mild neutrophilia.  Lymphocyte count is mildly elevated proportionately to WBC count.  -CMP is unremarkable, CRP is mildly elevated.  Iron panel, B12, folate, LDH levels are WNL.  -Flow cytometry, JAK2 mutational analysis, BCR-ABL PCR and KIRBY testing was ordered and ireported negtive.  -Discussed these results with patient, the leukocytosis is likely reactive in  nature. Will continue to monitor.      History of DVT:  May Thurner syndrome:  -Recent lower extremity duplex ultrasound suggestive of chronic left lower extremity DVT.  -Prior history of DVT following her pregnancy more than 20 years ago.  Patient is not on anticoagulation presently.  -Given long time having elapsed since her diagnosis of DVT and not being on therapeutic anticoagulation since then and also in the absence of any ongoing symptoms presently, likely not a candidate for anticoagulation in my view.  Will continue to observe.  -Patient is following with vascular surgery for May-Thurner syndrome, no indications " for surgical intervention at this time.  -No new symptoms reported today.    History of MARIBEL/stage Ia cervical cancer:  Continue surveillance with [facility].    Weight Loss:  Decreased appetite:  Patient is planned for UGI endoscopy later this week. Will follow up on results.    Follow up in 6 months with repeat labs, sooner As needed.  Thank you very much for providing the opportunity to participate in this patient’s care. Please do not hesitate to call if there are any other questions.

## 2024-06-05 ENCOUNTER — OFFICE VISIT (OUTPATIENT)
Dept: ONCOLOGY | Facility: CLINIC | Age: 49
End: 2024-06-05
Payer: COMMERCIAL

## 2024-06-05 VITALS
BODY MASS INDEX: 29.73 KG/M2 | SYSTOLIC BLOOD PRESSURE: 162 MMHG | WEIGHT: 167.8 LBS | HEIGHT: 63 IN | HEART RATE: 73 BPM | OXYGEN SATURATION: 100 % | DIASTOLIC BLOOD PRESSURE: 98 MMHG

## 2024-06-05 DIAGNOSIS — N87.9 CERVICAL INTRAEPITHELIAL NEOPLASIA (CIN): ICD-10-CM

## 2024-06-05 DIAGNOSIS — I82.512 CHRONIC DEEP VEIN THROMBOSIS (DVT) OF FEMORAL VEIN OF LEFT LOWER EXTREMITY: ICD-10-CM

## 2024-06-05 DIAGNOSIS — R63.4 WEIGHT LOSS: ICD-10-CM

## 2024-06-05 DIAGNOSIS — I87.1 MAY-THURNER SYNDROME: ICD-10-CM

## 2024-06-05 DIAGNOSIS — D72.828 ACQUIRED NEUTROPHILIA: ICD-10-CM

## 2024-06-05 DIAGNOSIS — D72.829 LEUKOCYTOSIS, UNSPECIFIED TYPE: Primary | ICD-10-CM

## 2024-06-07 ENCOUNTER — OFFICE (AMBULATORY)
Dept: URBAN - METROPOLITAN AREA PATHOLOGY 19 | Facility: PATHOLOGY | Age: 49
End: 2024-06-07

## 2024-06-07 ENCOUNTER — ON CAMPUS - OUTPATIENT (AMBULATORY)
Dept: URBAN - METROPOLITAN AREA HOSPITAL 2 | Facility: HOSPITAL | Age: 49
End: 2024-06-07

## 2024-06-07 VITALS
TEMPERATURE: 97.8 F | DIASTOLIC BLOOD PRESSURE: 118 MMHG | OXYGEN SATURATION: 99 % | SYSTOLIC BLOOD PRESSURE: 134 MMHG | HEART RATE: 70 BPM | SYSTOLIC BLOOD PRESSURE: 159 MMHG | SYSTOLIC BLOOD PRESSURE: 143 MMHG | RESPIRATION RATE: 17 BRPM | DIASTOLIC BLOOD PRESSURE: 96 MMHG | DIASTOLIC BLOOD PRESSURE: 93 MMHG | HEART RATE: 92 BPM | HEART RATE: 71 BPM | RESPIRATION RATE: 16 BRPM | RESPIRATION RATE: 14 BRPM | WEIGHT: 167 LBS | SYSTOLIC BLOOD PRESSURE: 150 MMHG | HEIGHT: 63 IN | OXYGEN SATURATION: 100 % | DIASTOLIC BLOOD PRESSURE: 92 MMHG | HEART RATE: 76 BPM | HEART RATE: 90 BPM | DIASTOLIC BLOOD PRESSURE: 89 MMHG | HEART RATE: 64 BPM | OXYGEN SATURATION: 96 % | SYSTOLIC BLOOD PRESSURE: 181 MMHG | RESPIRATION RATE: 20 BRPM | RESPIRATION RATE: 15 BRPM | SYSTOLIC BLOOD PRESSURE: 183 MMHG | DIASTOLIC BLOOD PRESSURE: 103 MMHG | HEART RATE: 88 BPM | SYSTOLIC BLOOD PRESSURE: 193 MMHG

## 2024-06-07 DIAGNOSIS — R10.9 UNSPECIFIED ABDOMINAL PAIN: ICD-10-CM

## 2024-06-07 DIAGNOSIS — K31.89 OTHER DISEASES OF STOMACH AND DUODENUM: ICD-10-CM

## 2024-06-07 DIAGNOSIS — K31.7 POLYP OF STOMACH AND DUODENUM: ICD-10-CM

## 2024-06-07 DIAGNOSIS — K21.9 GASTRO-ESOPHAGEAL REFLUX DISEASE WITHOUT ESOPHAGITIS: ICD-10-CM

## 2024-06-07 LAB
GI HISTOLOGY: A. STOMACH ANTRUM: (no result)
GI HISTOLOGY: B. MIDDLE THIRD OF THE ESOPHAGUS: (no result)
GI HISTOLOGY: PDF REPORT: (no result)

## 2024-06-07 PROCEDURE — 88342 IMHCHEM/IMCYTCHM 1ST ANTB: CPT | Performed by: PATHOLOGY

## 2024-06-07 PROCEDURE — 43450 DILATE ESOPHAGUS 1/MULT PASS: CPT | Performed by: INTERNAL MEDICINE

## 2024-06-07 PROCEDURE — 43239 EGD BIOPSY SINGLE/MULTIPLE: CPT | Performed by: INTERNAL MEDICINE

## 2024-06-07 PROCEDURE — 88305 TISSUE EXAM BY PATHOLOGIST: CPT | Performed by: PATHOLOGY

## 2024-06-07 RX ORDER — FAMOTIDINE 40 MG/1
40 TABLET, FILM COATED ORAL
Qty: 30 | Refills: 11 | Status: ACTIVE
Start: 2024-06-07

## 2024-07-03 PROBLEM — C53.9 CERVICAL CANCER: Status: ACTIVE | Noted: 2024-07-03

## 2024-07-03 PROBLEM — M79.89 LEG SWELLING: Chronic | Status: ACTIVE | Noted: 2024-07-03

## 2024-07-08 NOTE — PROGRESS NOTES
"Chief Complaint  Leg Swelling (3 month follow up - no studies/)    Referral for left leg swelling and pain    Subjective        Eleanor Bonds presents to [facility] VASCULAR SURGERY  History of Present Illness    Patient is a pleasant 48-year-old lady with a previous left leg DVT who now has a chronically occluded left iliac vein who was referred to us for leg swelling and pain earlier this year. I last saw her on April 9, 2024, at which time patient was recommended to wear compression socks more regularly and monitor for symptoms.    Pt reports persistent sore area on the back of her Left leg and intermittent leg swelling that waxes and wanes. She readily admits to not having worn compression socks with any regularity.     Objective   Vital Signs:  /98 (BP Location: Right arm, Patient Position: Sitting)   Ht 160 cm (63\")   Wt 75.3 kg (166 lb)   BMI 29.41 kg/m²   Estimated body mass index is 29.41 kg/m² as calculated from the following:    Height as of this encounter: 160 cm (63\").    Weight as of this encounter: 75.3 kg (166 lb).         Patient BMI noted. Educational material for patient for health risks of being overweight added to patient's after visit summary.        Physical Exam     NAD  Palpable L DP pulse  Left leg mild non-pitting edema compared to right. No wounds or sores.   Appears stable from the last time I saw her.       Result Review :                  I reviewed and interpreted the images from her duplex scan.  She has chronic nonocclusive thrombus in the common femoral and proximal femoral veins with some reflux noted in the popliteal vein and femoral veins.    I reviewed and interpreted the images from her CT abdomen pelvis 7/11/2022.  Appears to show chronic left common iliac artery occlusion with numerous superficial venous collaterals suggesting chronic occlusion           Assessment and Plan       48-year-old-year-old lady with history of previous DVT, likely " previous May-Thurner syndrome now with completely occluded left common iliac vein that is chronically occluded based on CT scan performed almost 2 years ago.    Pt with chronic back pain and chronic left leg swelling that waxes and wanes in severity but no ulcerations. She is admittedly non-compliant with conservative management. I do not think intervention would provide significant long term improvement considering how mild her symptoms are, and would be at high risk of causing significant referred back pain.     Encouraged her again to wear compression hose routinely.     Will see her again in 1 year.       Diagnoses and all orders for this visit:    1. Leg swelling (Primary)  Assessment & Plan:  As noted above.       2. Acquired occlusion of iliac vein                     Follow Up     Return in about 1 year (around 7/9/2025).  Patient was given instructions and counseling regarding her condition or for health maintenance advice. Please see specific information pulled into the AVS if appropriate.

## 2024-07-09 ENCOUNTER — OFFICE VISIT (OUTPATIENT)
Age: 49
End: 2024-07-09
Payer: COMMERCIAL

## 2024-07-09 VITALS
DIASTOLIC BLOOD PRESSURE: 98 MMHG | BODY MASS INDEX: 29.41 KG/M2 | WEIGHT: 166 LBS | HEIGHT: 63 IN | SYSTOLIC BLOOD PRESSURE: 169 MMHG

## 2024-07-09 DIAGNOSIS — M79.89 LEG SWELLING: Primary | Chronic | ICD-10-CM

## 2024-07-09 DIAGNOSIS — I82.429: ICD-10-CM

## 2024-07-09 PROCEDURE — 99213 OFFICE O/P EST LOW 20 MIN: CPT | Performed by: STUDENT IN AN ORGANIZED HEALTH CARE EDUCATION/TRAINING PROGRAM

## 2024-07-29 ENCOUNTER — LAB (OUTPATIENT)
Dept: FAMILY MEDICINE CLINIC | Facility: CLINIC | Age: 49
End: 2024-07-29
Payer: COMMERCIAL

## 2024-07-29 ENCOUNTER — OFFICE VISIT (OUTPATIENT)
Dept: FAMILY MEDICINE CLINIC | Facility: CLINIC | Age: 49
End: 2024-07-29
Payer: COMMERCIAL

## 2024-07-29 VITALS
BODY MASS INDEX: 30.65 KG/M2 | SYSTOLIC BLOOD PRESSURE: 148 MMHG | TEMPERATURE: 97.4 F | HEART RATE: 57 BPM | DIASTOLIC BLOOD PRESSURE: 83 MMHG | HEIGHT: 63 IN | OXYGEN SATURATION: 100 % | WEIGHT: 173 LBS

## 2024-07-29 DIAGNOSIS — Z00.00 PREVENTATIVE HEALTH CARE: ICD-10-CM

## 2024-07-29 DIAGNOSIS — Z00.00 PREVENTATIVE HEALTH CARE: Primary | ICD-10-CM

## 2024-07-29 DIAGNOSIS — M54.9 UPPER BACK PAIN ON LEFT SIDE: ICD-10-CM

## 2024-07-29 DIAGNOSIS — Z12.31 ENCOUNTER FOR SCREENING MAMMOGRAM FOR MALIGNANT NEOPLASM OF BREAST: ICD-10-CM

## 2024-07-29 DIAGNOSIS — I87.1 MAY-THURNER SYNDROME: ICD-10-CM

## 2024-07-29 DIAGNOSIS — I10 HYPERTENSION, UNSPECIFIED TYPE: ICD-10-CM

## 2024-07-29 LAB
CHOLEST SERPL-MCNC: 180 MG/DL (ref 0–200)
HCV AB SER QL: NORMAL
HDLC SERPL-MCNC: 49 MG/DL (ref 40–60)
LDLC SERPL CALC-MCNC: 107 MG/DL (ref 0–100)
LDLC/HDLC SERPL: 2.13 {RATIO}
TRIGL SERPL-MCNC: 133 MG/DL (ref 0–150)
VLDLC SERPL-MCNC: 24 MG/DL (ref 5–40)

## 2024-07-29 PROCEDURE — 86803 HEPATITIS C AB TEST: CPT | Performed by: NURSE PRACTITIONER

## 2024-07-29 PROCEDURE — 99214 OFFICE O/P EST MOD 30 MIN: CPT | Performed by: NURSE PRACTITIONER

## 2024-07-29 PROCEDURE — 99396 PREV VISIT EST AGE 40-64: CPT | Performed by: NURSE PRACTITIONER

## 2024-07-29 PROCEDURE — 80061 LIPID PANEL: CPT | Performed by: NURSE PRACTITIONER

## 2024-07-29 PROCEDURE — 36415 COLL VENOUS BLD VENIPUNCTURE: CPT

## 2024-07-29 RX ORDER — FAMOTIDINE 40 MG/1
40 TABLET, FILM COATED ORAL DAILY
COMMUNITY
Start: 2024-06-07

## 2024-07-29 RX ORDER — LOSARTAN POTASSIUM 50 MG/1
50 TABLET ORAL DAILY
Qty: 90 TABLET | Refills: 1 | Status: SHIPPED | OUTPATIENT
Start: 2024-07-29

## 2024-07-29 RX ORDER — METHYLPREDNISOLONE 4 MG/1
TABLET ORAL
Qty: 21 TABLET | Refills: 0 | Status: SHIPPED | OUTPATIENT
Start: 2024-07-29

## 2024-07-29 NOTE — PROGRESS NOTES
Subjective     Eleanor Bonds is a 48 y.o. female.     History of Present Illness  Patient is here today for annual CPE.  Works in a factory  She is   She has 4 children  She does not smoke  Drinks on rare occasion  She stays active with work and with her grandchilldren  Eats a well balanced diet- cut out sodas  She hs been having some shoulder blade pain on the left side  Every time she breathes it hurts.  She states it started today.     Hypertension-patient is currently on losartan 25 mg daily. She monitored BP at home for 2 weeks. She states it is all over the place. It runs high at times.    GERD-patient is currently on Prilosec 40 mg daily, pepcid 40mg daily, and carafate. She saw GI. Had an EGD on 7/7/24and they stretched her esophagus. She is doing ok on meds. She sees them again on 8/8.    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  She has seen hematology and vascular for this.  Vascular wants to do conservative measures and monitor at this time.  She is supposed to be wearing compression stockings.  Hematology does not think she needs anticoagulation at this time and we will see her biannually. She has some chronic hardening of the left leg.     Cervical cancer-patient was diagnosed last year and sees Dr. Starkey.  She had a partial hysterectomy.  She did not have to do radiation or chemo. She sees him yearly.           Labs- UTD  Pap smear-  Mammogram- due  Colonoscopy- d4/28/23     Vaccines:  Flu- refused  PNA-  Shingles-  Tdap- due  Covid-19-     Dental exam-  Eye exam-         The following portions of the patient's history were reviewed and updated as appropriate: allergies, current medications, past family history, past medical history, past social history, past surgical history, and problem list.    Review of Systems   Constitutional:  Negative for appetite change, chills, fatigue and fever.   HENT:  Negative for congestion, ear pain, hearing loss, postnasal drip,  "rhinorrhea, sinus pressure, sore throat, swollen glands and trouble swallowing.    Eyes:  Negative for blurred vision, double vision, pain, discharge, itching and visual disturbance.   Respiratory:  Negative for cough, chest tightness, shortness of breath and wheezing.    Cardiovascular:  Positive for leg swelling. Negative for chest pain and palpitations.   Gastrointestinal:  Positive for indigestion. Negative for abdominal pain, blood in stool, constipation, diarrhea, nausea and vomiting.   Endocrine: Negative for polydipsia, polyphagia and polyuria.   Genitourinary:  Negative for breast lump, breast pain, dysuria, flank pain, frequency, urgency, vaginal bleeding, vaginal discharge and vaginal pain.   Musculoskeletal:  Positive for back pain. Negative for arthralgias and myalgias.   Skin:  Negative for rash and skin lesions.   Neurological:  Negative for dizziness, weakness, numbness and headache.   Psychiatric/Behavioral:  Negative for depressed mood and stress. The patient is not nervous/anxious.        Objective     /83 (BP Location: Left arm, Patient Position: Sitting, Cuff Size: Large Adult)   Pulse 57   Temp 97.4 °F (36.3 °C) (Temporal)   Ht 160 cm (63\")   Wt 78.5 kg (173 lb)   LMP 03/03/2020   SpO2 100%   BMI 30.65 kg/m²     Current Outpatient Medications on File Prior to Visit   Medication Sig Dispense Refill    famotidine (PEPCID) 40 MG tablet Take 1 tablet by mouth Daily.      omeprazole (priLOSEC) 40 MG capsule Take 1 capsule by mouth Daily.      sucralfate (CARAFATE) 1 g tablet Take 1 tablet by mouth 4 (Four) Times a Day. 120 tablet 0    [DISCONTINUED] losartan (COZAAR) 25 MG tablet Take 1 tablet by mouth Daily. 90 tablet 1    [DISCONTINUED] cyclobenzaprine (FLEXERIL) 10 MG tablet Take 1 tablet by mouth 3 (Three) Times a Day As Needed. (Patient not taking: Reported on 7/9/2024)      [DISCONTINUED] methylPREDNISolone (MEDROL) 4 MG dose pack Take As Directed. (Patient not taking: Reported on " 7/9/2024)      [DISCONTINUED] omeprazole (priLOSEC) 20 MG capsule Take 1 capsule by mouth Daily As Needed. (Patient not taking: Reported on 7/9/2024)       No current facility-administered medications on file prior to visit.        BMI is >= 30 and <35. (Class 1 Obesity). The following options were offered after discussion;: exercise counseling/recommendations and nutrition counseling/recommendations       Physical Exam  Vitals reviewed.   Constitutional:       General: She is not in acute distress.     Appearance: Normal appearance. She is well-developed. She is not diaphoretic.   HENT:      Head: Normocephalic and atraumatic.      Right Ear: Tympanic membrane and ear canal normal.      Left Ear: Tympanic membrane and ear canal normal.      Nose: No congestion or rhinorrhea.      Mouth/Throat:      Pharynx: No oropharyngeal exudate or posterior oropharyngeal erythema.   Eyes:      General:         Right eye: No discharge.         Left eye: No discharge.      Extraocular Movements: Extraocular movements intact.      Conjunctiva/sclera: Conjunctivae normal.   Cardiovascular:      Rate and Rhythm: Normal rate and regular rhythm.      Heart sounds: No murmur heard.  Pulmonary:      Effort: Pulmonary effort is normal. No respiratory distress.      Breath sounds: Normal breath sounds. No wheezing or rales.   Abdominal:      General: Bowel sounds are normal.      Palpations: Abdomen is soft.   Musculoskeletal:         General: No tenderness. Normal range of motion.      Cervical back: Normal range of motion.   Skin:     General: Skin is warm and dry.   Neurological:      General: No focal deficit present.      Mental Status: She is alert and oriented to person, place, and time.   Psychiatric:         Mood and Affect: Mood normal.         Behavior: Behavior normal.         Thought Content: Thought content normal.         Judgment: Judgment normal.           Assessment & Plan     Diagnoses and all orders for this  visit:    1. Preventative health care (Primary)  Comments:  doing well overall  work on diet and excercise  check labs  had hysterectomy  mammo due  Orders:  -     Lipid Panel; Future  -     Hepatitis C Antibody; Future    2. Encounter for screening mammogram for malignant neoplasm of breast  -     Mammo Screening Digital Tomosynthesis Bilateral With CAD; Future    3. Hypertension, unspecified type  Comments:  elevated today  increased losartan to 50mg daily  f/u 1 mo  Orders:  -     losartan (Cozaar) 50 MG tablet; Take 1 tablet by mouth Daily.  Dispense: 90 tablet; Refill: 1    4. Upper back pain on left side  Comments:  appears to be trapezius strain  steroid pack  stretch  call if no imp  Orders:  -     methylPREDNISolone (MEDROL) 4 MG dose pack; Take as directed on package instructions.  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

## 2024-07-30 ENCOUNTER — TELEPHONE (OUTPATIENT)
Dept: FAMILY MEDICINE CLINIC | Facility: CLINIC | Age: 49
End: 2024-07-30
Payer: COMMERCIAL

## 2024-07-30 NOTE — TELEPHONE ENCOUNTER
Caller: Eleanor Bonds    Relationship: Self    Best call back number: [phone]     What form or medical record are you requesting: DR NOTE    Who is requesting this form or medical record from you: EMPLOYER    How would you like to receive the form or medical records (pick-up, mail, fax): FAX  If fax, what is the fax number: [phone]      Additional notes: PATIENT NEEDS 'S NOTE FAXED TO HER EMPLOYER REGARDING MOST RECENT APPT. 7/29/24

## 2024-08-08 ENCOUNTER — OFFICE (AMBULATORY)
Dept: URBAN - METROPOLITAN AREA CLINIC 64 | Facility: CLINIC | Age: 49
End: 2024-08-08
Payer: COMMERCIAL

## 2024-08-08 VITALS
WEIGHT: 170 LBS | HEART RATE: 50 BPM | DIASTOLIC BLOOD PRESSURE: 82 MMHG | HEIGHT: 63 IN | SYSTOLIC BLOOD PRESSURE: 138 MMHG

## 2024-08-08 DIAGNOSIS — R19.8 OTHER SPECIFIED SYMPTOMS AND SIGNS INVOLVING THE DIGESTIVE S: ICD-10-CM

## 2024-08-08 DIAGNOSIS — Z86.010 PERSONAL HISTORY OF COLONIC POLYPS: ICD-10-CM

## 2024-08-08 DIAGNOSIS — K21.9 GASTRO-ESOPHAGEAL REFLUX DISEASE WITHOUT ESOPHAGITIS: ICD-10-CM

## 2024-08-08 DIAGNOSIS — K31.7 POLYP OF STOMACH AND DUODENUM: ICD-10-CM

## 2024-08-08 PROCEDURE — 99213 OFFICE O/P EST LOW 20 MIN: CPT | Performed by: NURSE PRACTITIONER

## 2024-08-29 NOTE — PROGRESS NOTES
Subjective     Eleanor Bonds is a 48 y.o. female.     History of Present Illness  Patient is here today for 1 month follow-up on hypertension.  Her losartan was increased to 50 mg at her last visit.  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    Review of Systems   Constitutional:  Negative for chills, fatigue and fever.   Respiratory:  Positive for shortness of breath. Negative for chest tightness.    Cardiovascular:  Negative for chest pain and palpitations.   Neurological:  Negative for dizziness and headache.       Objective     /82   Pulse 67   Temp 97.7 °F (36.5 °C) (Tympanic)   Wt 75.8 kg (167 lb)   LMP 03/03/2020   SpO2 98%   BMI 29.58 kg/m²     Current Outpatient Medications on File Prior to Visit   Medication Sig Dispense Refill    famotidine (PEPCID) 40 MG tablet Take 1 tablet by mouth Daily.      losartan (Cozaar) 50 MG tablet Take 1 tablet by mouth Daily. 90 tablet 1    methylPREDNISolone (MEDROL) 4 MG dose pack Take as directed on package instructions. 21 tablet 0    omeprazole (priLOSEC) 40 MG capsule Take 1 capsule by mouth Daily.      sucralfate (CARAFATE) 1 g tablet Take 1 tablet by mouth 4 (Four) Times a Day. 120 tablet 0     No current facility-administered medications on file prior to visit.                 Physical Exam  Constitutional:       General: She is not in acute distress.     Appearance: Normal appearance. She is not ill-appearing.   HENT:      Head: Normocephalic and atraumatic.   Eyes:       Extraocular Movements: Extraocular movements intact.   Cardiovascular:      Rate and Rhythm: Normal rate and regular rhythm.      Heart sounds: No murmur heard.  Pulmonary:      Effort: Pulmonary effort is normal. No respiratory distress.   Neurological:      General: No focal deficit present.      Mental Status: She is alert and oriented to person, place, and time.   Psychiatric:         Mood and Affect: Mood normal.         Behavior: Behavior normal.         Thought Content: Thought content normal.         Judgment: Judgment normal.           Assessment & Plan     Diagnoses and all orders for this visit:    1. Hypertension, unspecified type (Primary)  Comments:  improved  cont losartan 50mg daily  doing well    2. History of smoking  Comments:  will get prev CT screening  Orders:  -     CT Chest Low Dose Wo; Future    3. Shortness of breath  Comments:  poss some COPD from smoking history  albuterol as needed  will get CT scan  lungs cslear  Orders:  -     albuterol sulfate  (90 Base) MCG/ACT inhaler; Inhale 2 puffs Every 4 (Four) Hours As Needed for Wheezing.  Dispense: 6.7 g; Refill: 0    4. Epidermoid cyst of skin of scalp  Comments:  becoming more painful  refer to gen surgery for removal  Orders:  -     Ambulatory Referral to General Surgery

## 2024-08-30 ENCOUNTER — OFFICE VISIT (OUTPATIENT)
Dept: FAMILY MEDICINE CLINIC | Facility: CLINIC | Age: 49
End: 2024-08-30
Payer: COMMERCIAL

## 2024-08-30 VITALS
DIASTOLIC BLOOD PRESSURE: 82 MMHG | WEIGHT: 167 LBS | OXYGEN SATURATION: 98 % | SYSTOLIC BLOOD PRESSURE: 135 MMHG | HEART RATE: 67 BPM | TEMPERATURE: 97.7 F | BODY MASS INDEX: 29.58 KG/M2

## 2024-08-30 DIAGNOSIS — I10 HYPERTENSION, UNSPECIFIED TYPE: Primary | ICD-10-CM

## 2024-08-30 DIAGNOSIS — R06.02 SHORTNESS OF BREATH: ICD-10-CM

## 2024-08-30 DIAGNOSIS — Z87.891 HISTORY OF SMOKING: ICD-10-CM

## 2024-08-30 DIAGNOSIS — L72.0 EPIDERMOID CYST OF SKIN OF SCALP: ICD-10-CM

## 2024-08-30 PROCEDURE — 99214 OFFICE O/P EST MOD 30 MIN: CPT | Performed by: NURSE PRACTITIONER

## 2024-08-30 RX ORDER — ALBUTEROL SULFATE 90 UG/1
2 AEROSOL, METERED RESPIRATORY (INHALATION) EVERY 4 HOURS PRN
Qty: 6.7 G | Refills: 0 | Status: SHIPPED | OUTPATIENT
Start: 2024-08-30

## 2024-09-05 ENCOUNTER — HOSPITAL ENCOUNTER (OUTPATIENT)
Dept: MAMMOGRAPHY | Facility: HOSPITAL | Age: 49
Discharge: HOME | End: 2024-09-05
Admitting: NURSE PRACTITIONER
Payer: COMMERCIAL

## 2024-09-05 DIAGNOSIS — Z12.31 ENCOUNTER FOR SCREENING MAMMOGRAM FOR MALIGNANT NEOPLASM OF BREAST: ICD-10-CM

## 2024-09-05 PROCEDURE — 77063 BREAST TOMOSYNTHESIS BI: CPT

## 2024-09-05 PROCEDURE — 77067 SCR MAMMO BI INCL CAD: CPT

## 2024-09-17 ENCOUNTER — OFFICE VISIT (OUTPATIENT)
Dept: SURGERY | Facility: CLINIC | Age: 49
End: 2024-09-17
Payer: COMMERCIAL

## 2024-09-17 ENCOUNTER — PREP FOR SURGERY (OUTPATIENT)
Dept: OTHER | Facility: HOSPITAL | Age: 49
End: 2024-09-17
Payer: COMMERCIAL

## 2024-09-17 VITALS
BODY MASS INDEX: 29.96 KG/M2 | HEIGHT: 63 IN | TEMPERATURE: 98 F | DIASTOLIC BLOOD PRESSURE: 85 MMHG | WEIGHT: 169.1 LBS | SYSTOLIC BLOOD PRESSURE: 122 MMHG | HEART RATE: 63 BPM | OXYGEN SATURATION: 100 %

## 2024-09-17 DIAGNOSIS — L72.3 SEBACEOUS CYST: Primary | ICD-10-CM

## 2024-09-17 PROCEDURE — 99213 OFFICE O/P EST LOW 20 MIN: CPT | Performed by: STUDENT IN AN ORGANIZED HEALTH CARE EDUCATION/TRAINING PROGRAM

## 2024-09-17 NOTE — H&P (VIEW-ONLY)
"Chief Complaint  New Problem and Cyst (Scalp Cyst)    Subjective        Eleanor Bonds presents to [facility] GENERAL SURGERY  History of Present Illness    48-year-old lady here to see me for multiple sebaceous cysts of her scalp.  She has 2 on the right side 1 on the left side.  These are about 1 cm apiece.  Intermittently cause her pain with pressure when she bumps them.  Are not growing no other subcutaneous lesions.  Discussed options for continued monitoring versus excision in the operating room.  Discussed with benefits and alternatives of surgery including infection, bleeding, injury surrounding structures and patient elected to proceed.    Objective   Vital Signs:  /85 (Cuff Size: Large Adult)   Pulse 63   Temp 98 °F (36.7 °C) (Infrared)   Ht 160 cm (63\")   Wt 76.7 kg (169 lb 1.6 oz)   SpO2 100%   BMI 29.95 kg/m²   Estimated body mass index is 29.95 kg/m² as calculated from the following:    Height as of this encounter: 160 cm (63\").    Weight as of this encounter: 76.7 kg (169 lb 1.6 oz).            Physical Exam  Constitutional:       General: She is not in acute distress.     Appearance: Normal appearance. She is not ill-appearing.   HENT:      Head: Normocephalic and atraumatic.      Right Ear: External ear normal.      Left Ear: External ear normal.   Eyes:      Extraocular Movements: Extraocular movements intact.      Conjunctiva/sclera: Conjunctivae normal.   Cardiovascular:      Rate and Rhythm: Normal rate and regular rhythm.   Pulmonary:      Effort: Pulmonary effort is normal. No respiratory distress.   Abdominal:      General: There is no distension.      Palpations: Abdomen is soft.      Tenderness: There is no abdominal tenderness.   Musculoskeletal:         General: No swelling or deformity.   Skin:     General: Skin is warm and dry.   Neurological:      Mental Status: She is alert and oriented to person, place, and time. Mental status is at baseline.      "   Result Review :                Assessment and Plan   Diagnoses and all orders for this visit:    1. Sebaceous cyst (Primary)      48-year-old lady here to see me for multiple sebaceous cysts of her scalp.  She has 2 on the right side 1 on the left side.  These are about 1 cm apiece.  Intermittently cause her pain with pressure when she bumps them.  Are not growing no other subcutaneous lesions.  Discussed options for continued monitoring versus excision in the operating room.  Discussed with benefits and alternatives of surgery including infection, bleeding, injury surrounding structures and patient elected to proceed.         Follow Up   No follow-ups on file.  Patient was given instructions and counseling regarding her condition or for health maintenance advice. Please see specific information pulled into the AVS if appropriate.

## 2024-10-08 ENCOUNTER — ANESTHESIA EVENT (OUTPATIENT)
Dept: PERIOP | Facility: HOSPITAL | Age: 49
End: 2024-10-08
Payer: COMMERCIAL

## 2024-10-08 ENCOUNTER — HOSPITAL ENCOUNTER (OUTPATIENT)
Facility: HOSPITAL | Age: 49
Setting detail: HOSPITAL OUTPATIENT SURGERY
Discharge: HOME | End: 2024-10-08
Attending: STUDENT IN AN ORGANIZED HEALTH CARE EDUCATION/TRAINING PROGRAM | Admitting: STUDENT IN AN ORGANIZED HEALTH CARE EDUCATION/TRAINING PROGRAM
Payer: COMMERCIAL

## 2024-10-08 ENCOUNTER — ANESTHESIA (OUTPATIENT)
Dept: PERIOP | Facility: HOSPITAL | Age: 49
End: 2024-10-08
Payer: COMMERCIAL

## 2024-10-08 VITALS
TEMPERATURE: 98.1 F | HEART RATE: 68 BPM | WEIGHT: 169 LBS | OXYGEN SATURATION: 100 % | HEIGHT: 63 IN | SYSTOLIC BLOOD PRESSURE: 116 MMHG | RESPIRATION RATE: 11 BRPM | DIASTOLIC BLOOD PRESSURE: 79 MMHG | BODY MASS INDEX: 29.95 KG/M2

## 2024-10-08 DIAGNOSIS — L72.3 SEBACEOUS CYST: Primary | ICD-10-CM

## 2024-10-08 PROCEDURE — 25010000002 CEFAZOLIN PER 500 MG: Performed by: STUDENT IN AN ORGANIZED HEALTH CARE EDUCATION/TRAINING PROGRAM

## 2024-10-08 PROCEDURE — 25010000002 MIDAZOLAM PER 1 MG: Performed by: NURSE ANESTHETIST, CERTIFIED REGISTERED

## 2024-10-08 PROCEDURE — 25010000002 ONDANSETRON PER 1 MG: Performed by: NURSE ANESTHETIST, CERTIFIED REGISTERED

## 2024-10-08 PROCEDURE — 25810000003 LACTATED RINGERS PER 1000 ML: Performed by: NURSE ANESTHETIST, CERTIFIED REGISTERED

## 2024-10-08 PROCEDURE — 25010000002 LIDOCAINE PF 2% 2 % SOLUTION: Performed by: NURSE ANESTHETIST, CERTIFIED REGISTERED

## 2024-10-08 PROCEDURE — 25010000002 HYDROMORPHONE 1 MG/ML SOLUTION: Performed by: NURSE ANESTHETIST, CERTIFIED REGISTERED

## 2024-10-08 PROCEDURE — 25010000002 SUGAMMADEX 200 MG/2ML SOLUTION: Performed by: NURSE ANESTHETIST, CERTIFIED REGISTERED

## 2024-10-08 PROCEDURE — 25010000002 BUPIVACAINE (PF) 0.25 % SOLUTION: Performed by: STUDENT IN AN ORGANIZED HEALTH CARE EDUCATION/TRAINING PROGRAM

## 2024-10-08 PROCEDURE — 25010000002 PROPOFOL 200 MG/20ML EMULSION: Performed by: NURSE ANESTHETIST, CERTIFIED REGISTERED

## 2024-10-08 PROCEDURE — 25010000002 DEXAMETHASONE PER 1 MG: Performed by: NURSE ANESTHETIST, CERTIFIED REGISTERED

## 2024-10-08 PROCEDURE — 25810000003 LACTATED RINGERS SOLUTION: Performed by: ANESTHESIOLOGY

## 2024-10-08 PROCEDURE — 25010000002 FENTANYL CITRATE (PF) 100 MCG/2ML SOLUTION: Performed by: NURSE ANESTHETIST, CERTIFIED REGISTERED

## 2024-10-08 PROCEDURE — 25810000003 LACTATED RINGERS PER 1000 ML: Performed by: STUDENT IN AN ORGANIZED HEALTH CARE EDUCATION/TRAINING PROGRAM

## 2024-10-08 PROCEDURE — 88304 TISSUE EXAM BY PATHOLOGIST: CPT | Performed by: STUDENT IN AN ORGANIZED HEALTH CARE EDUCATION/TRAINING PROGRAM

## 2024-10-08 RX ORDER — MIDAZOLAM HYDROCHLORIDE 1 MG/ML
INJECTION INTRAMUSCULAR; INTRAVENOUS AS NEEDED
Status: DISCONTINUED | OUTPATIENT
Start: 2024-10-08 | End: 2024-10-08 | Stop reason: SURG

## 2024-10-08 RX ORDER — DEXAMETHASONE SODIUM PHOSPHATE 4 MG/ML
INJECTION, SOLUTION INTRA-ARTICULAR; INTRALESIONAL; INTRAMUSCULAR; INTRAVENOUS; SOFT TISSUE AS NEEDED
Status: DISCONTINUED | OUTPATIENT
Start: 2024-10-08 | End: 2024-10-08 | Stop reason: SURG

## 2024-10-08 RX ORDER — ONDANSETRON 4 MG/1
4 TABLET, FILM COATED ORAL EVERY 8 HOURS PRN
Qty: 30 TABLET | Refills: 1 | Status: SHIPPED | OUTPATIENT
Start: 2024-10-08 | End: 2025-10-08

## 2024-10-08 RX ORDER — OXYCODONE HYDROCHLORIDE 5 MG/1
10 TABLET ORAL EVERY 4 HOURS PRN
Status: DISCONTINUED | OUTPATIENT
Start: 2024-10-08 | End: 2024-10-08 | Stop reason: HOSPADM

## 2024-10-08 RX ORDER — POLYETHYLENE GLYCOL 3350 17 G/17G
17 POWDER, FOR SOLUTION ORAL DAILY
Qty: 14 EACH | Refills: 0 | Status: SHIPPED | OUTPATIENT
Start: 2024-10-08

## 2024-10-08 RX ORDER — SODIUM CHLORIDE 0.9 % (FLUSH) 0.9 %
10 SYRINGE (ML) INJECTION AS NEEDED
Status: DISCONTINUED | OUTPATIENT
Start: 2024-10-08 | End: 2024-10-08 | Stop reason: HOSPADM

## 2024-10-08 RX ORDER — DIPHENHYDRAMINE HYDROCHLORIDE 50 MG/ML
12.5 INJECTION INTRAMUSCULAR; INTRAVENOUS
Status: DISCONTINUED | OUTPATIENT
Start: 2024-10-08 | End: 2024-10-08 | Stop reason: HOSPADM

## 2024-10-08 RX ORDER — ROCURONIUM BROMIDE 10 MG/ML
INJECTION, SOLUTION INTRAVENOUS AS NEEDED
Status: DISCONTINUED | OUTPATIENT
Start: 2024-10-08 | End: 2024-10-08 | Stop reason: SURG

## 2024-10-08 RX ORDER — GINSENG 100 MG
CAPSULE ORAL AS NEEDED
Status: DISCONTINUED | OUTPATIENT
Start: 2024-10-08 | End: 2024-10-08 | Stop reason: HOSPADM

## 2024-10-08 RX ORDER — IPRATROPIUM BROMIDE AND ALBUTEROL SULFATE 2.5; .5 MG/3ML; MG/3ML
3 SOLUTION RESPIRATORY (INHALATION) ONCE AS NEEDED
Status: DISCONTINUED | OUTPATIENT
Start: 2024-10-08 | End: 2024-10-08 | Stop reason: HOSPADM

## 2024-10-08 RX ORDER — OXYCODONE HYDROCHLORIDE 5 MG/1
5 TABLET ORAL ONCE AS NEEDED
Status: COMPLETED | OUTPATIENT
Start: 2024-10-08 | End: 2024-10-08

## 2024-10-08 RX ORDER — SODIUM CHLORIDE, SODIUM LACTATE, POTASSIUM CHLORIDE, CALCIUM CHLORIDE 600; 310; 30; 20 MG/100ML; MG/100ML; MG/100ML; MG/100ML
INJECTION, SOLUTION INTRAVENOUS CONTINUOUS PRN
Status: DISCONTINUED | OUTPATIENT
Start: 2024-10-08 | End: 2024-10-08 | Stop reason: SURG

## 2024-10-08 RX ORDER — LIDOCAINE HYDROCHLORIDE 20 MG/ML
INJECTION, SOLUTION EPIDURAL; INFILTRATION; INTRACAUDAL; PERINEURAL AS NEEDED
Status: DISCONTINUED | OUTPATIENT
Start: 2024-10-08 | End: 2024-10-08 | Stop reason: SURG

## 2024-10-08 RX ORDER — DIPHENHYDRAMINE HYDROCHLORIDE 50 MG/ML
12.5 INJECTION INTRAMUSCULAR; INTRAVENOUS ONCE AS NEEDED
Status: DISCONTINUED | OUTPATIENT
Start: 2024-10-08 | End: 2024-10-08 | Stop reason: HOSPADM

## 2024-10-08 RX ORDER — SODIUM CHLORIDE, SODIUM LACTATE, POTASSIUM CHLORIDE, CALCIUM CHLORIDE 600; 310; 30; 20 MG/100ML; MG/100ML; MG/100ML; MG/100ML
25 INJECTION, SOLUTION INTRAVENOUS CONTINUOUS
Status: DISPENSED | OUTPATIENT
Start: 2024-10-08 | End: 2024-10-08

## 2024-10-08 RX ORDER — PROPOFOL 10 MG/ML
INJECTION, EMULSION INTRAVENOUS AS NEEDED
Status: DISCONTINUED | OUTPATIENT
Start: 2024-10-08 | End: 2024-10-08 | Stop reason: SURG

## 2024-10-08 RX ORDER — FENTANYL CITRATE 50 UG/ML
INJECTION, SOLUTION INTRAMUSCULAR; INTRAVENOUS AS NEEDED
Status: DISCONTINUED | OUTPATIENT
Start: 2024-10-08 | End: 2024-10-08 | Stop reason: SURG

## 2024-10-08 RX ORDER — LIDOCAINE HYDROCHLORIDE 10 MG/ML
0.5 INJECTION, SOLUTION EPIDURAL; INFILTRATION; INTRACAUDAL; PERINEURAL ONCE AS NEEDED
Status: DISCONTINUED | OUTPATIENT
Start: 2024-10-08 | End: 2024-10-08 | Stop reason: HOSPADM

## 2024-10-08 RX ORDER — LABETALOL HYDROCHLORIDE 5 MG/ML
5 INJECTION, SOLUTION INTRAVENOUS
Status: DISCONTINUED | OUTPATIENT
Start: 2024-10-08 | End: 2024-10-08 | Stop reason: HOSPADM

## 2024-10-08 RX ORDER — BUPIVACAINE HYDROCHLORIDE 2.5 MG/ML
INJECTION, SOLUTION EPIDURAL; INFILTRATION; INTRACAUDAL AS NEEDED
Status: DISCONTINUED | OUTPATIENT
Start: 2024-10-08 | End: 2024-10-08 | Stop reason: HOSPADM

## 2024-10-08 RX ORDER — HYDROCODONE BITARTRATE AND ACETAMINOPHEN 5; 325 MG/1; MG/1
1 TABLET ORAL EVERY 6 HOURS PRN
Qty: 15 TABLET | Refills: 0 | Status: SHIPPED | OUTPATIENT
Start: 2024-10-08

## 2024-10-08 RX ORDER — DEXMEDETOMIDINE HYDROCHLORIDE 100 UG/ML
INJECTION, SOLUTION INTRAVENOUS AS NEEDED
Status: DISCONTINUED | OUTPATIENT
Start: 2024-10-08 | End: 2024-10-08 | Stop reason: SURG

## 2024-10-08 RX ORDER — EPHEDRINE SULFATE 5 MG/ML
5 INJECTION INTRAVENOUS ONCE AS NEEDED
Status: DISCONTINUED | OUTPATIENT
Start: 2024-10-08 | End: 2024-10-08 | Stop reason: HOSPADM

## 2024-10-08 RX ORDER — MEPERIDINE HYDROCHLORIDE 25 MG/ML
12.5 INJECTION INTRAMUSCULAR; INTRAVENOUS; SUBCUTANEOUS
Status: DISCONTINUED | OUTPATIENT
Start: 2024-10-08 | End: 2024-10-08 | Stop reason: HOSPADM

## 2024-10-08 RX ORDER — HYDRALAZINE HYDROCHLORIDE 20 MG/ML
5 INJECTION INTRAMUSCULAR; INTRAVENOUS
Status: DISCONTINUED | OUTPATIENT
Start: 2024-10-08 | End: 2024-10-08 | Stop reason: HOSPADM

## 2024-10-08 RX ORDER — ONDANSETRON 2 MG/ML
4 INJECTION INTRAMUSCULAR; INTRAVENOUS ONCE AS NEEDED
Status: DISCONTINUED | OUTPATIENT
Start: 2024-10-08 | End: 2024-10-08 | Stop reason: HOSPADM

## 2024-10-08 RX ORDER — ONDANSETRON 2 MG/ML
INJECTION INTRAMUSCULAR; INTRAVENOUS AS NEEDED
Status: DISCONTINUED | OUTPATIENT
Start: 2024-10-08 | End: 2024-10-08 | Stop reason: SURG

## 2024-10-08 RX ORDER — NALOXONE HCL 0.4 MG/ML
0.4 VIAL (ML) INJECTION AS NEEDED
Status: DISCONTINUED | OUTPATIENT
Start: 2024-10-08 | End: 2024-10-08 | Stop reason: HOSPADM

## 2024-10-08 RX ADMIN — FENTANYL CITRATE 50 MCG: 50 INJECTION, SOLUTION INTRAMUSCULAR; INTRAVENOUS at 15:54

## 2024-10-08 RX ADMIN — MIDAZOLAM 1 MG: 1 INJECTION INTRAMUSCULAR; INTRAVENOUS at 15:04

## 2024-10-08 RX ADMIN — MIDAZOLAM 1 MG: 1 INJECTION INTRAMUSCULAR; INTRAVENOUS at 15:07

## 2024-10-08 RX ADMIN — DEXMEDETOMIDINE HYDROCHLORIDE 4 MCG: 100 INJECTION, SOLUTION INTRAVENOUS at 15:30

## 2024-10-08 RX ADMIN — SODIUM CHLORIDE, SODIUM LACTATE, POTASSIUM CHLORIDE, AND CALCIUM CHLORIDE: .6; .31; .03; .02 INJECTION, SOLUTION INTRAVENOUS at 15:08

## 2024-10-08 RX ADMIN — DEXAMETHASONE SODIUM PHOSPHATE 8 MG: 4 INJECTION, SOLUTION INTRAMUSCULAR; INTRAVENOUS at 15:18

## 2024-10-08 RX ADMIN — HYDROMORPHONE HYDROCHLORIDE 0.5 MG: 1 INJECTION, SOLUTION INTRAMUSCULAR; INTRAVENOUS; SUBCUTANEOUS at 17:27

## 2024-10-08 RX ADMIN — PROPOFOL 200 MG: 10 INJECTION, EMULSION INTRAVENOUS at 15:10

## 2024-10-08 RX ADMIN — HYDROMORPHONE HYDROCHLORIDE 0.5 MG: 1 INJECTION, SOLUTION INTRAMUSCULAR; INTRAVENOUS; SUBCUTANEOUS at 15:59

## 2024-10-08 RX ADMIN — FENTANYL CITRATE 50 MCG: 50 INJECTION, SOLUTION INTRAMUSCULAR; INTRAVENOUS at 15:21

## 2024-10-08 RX ADMIN — SUGAMMADEX 150 MG: 100 INJECTION, SOLUTION INTRAVENOUS at 16:34

## 2024-10-08 RX ADMIN — SODIUM CHLORIDE 2000 MG: 900 INJECTION INTRAVENOUS at 15:05

## 2024-10-08 RX ADMIN — OXYCODONE 5 MG: 5 TABLET ORAL at 17:56

## 2024-10-08 RX ADMIN — PROPOFOL 50 MG: 10 INJECTION, EMULSION INTRAVENOUS at 16:43

## 2024-10-08 RX ADMIN — HYDROMORPHONE HYDROCHLORIDE 0.5 MG: 1 INJECTION, SOLUTION INTRAMUSCULAR; INTRAVENOUS; SUBCUTANEOUS at 16:21

## 2024-10-08 RX ADMIN — SODIUM CHLORIDE, POTASSIUM CHLORIDE, SODIUM LACTATE AND CALCIUM CHLORIDE 25 ML/HR: 600; 310; 30; 20 INJECTION, SOLUTION INTRAVENOUS at 15:05

## 2024-10-08 RX ADMIN — DEXMEDETOMIDINE HYDROCHLORIDE 4 MCG: 100 INJECTION, SOLUTION INTRAVENOUS at 15:26

## 2024-10-08 RX ADMIN — LIDOCAINE HYDROCHLORIDE 80 MG: 20 INJECTION, SOLUTION EPIDURAL; INFILTRATION; INTRACAUDAL; PERINEURAL at 15:10

## 2024-10-08 RX ADMIN — ROCURONIUM BROMIDE 40 MG: 10 INJECTION, SOLUTION INTRAVENOUS at 15:10

## 2024-10-08 RX ADMIN — DEXMEDETOMIDINE HYDROCHLORIDE 4 MCG: 100 INJECTION, SOLUTION INTRAVENOUS at 15:22

## 2024-10-08 RX ADMIN — SODIUM CHLORIDE, POTASSIUM CHLORIDE, SODIUM LACTATE AND CALCIUM CHLORIDE 500 ML: 600; 310; 30; 20 INJECTION, SOLUTION INTRAVENOUS at 18:48

## 2024-10-08 RX ADMIN — ONDANSETRON 4 MG: 2 INJECTION INTRAMUSCULAR; INTRAVENOUS at 16:13

## 2024-10-08 RX ADMIN — DEXMEDETOMIDINE HYDROCHLORIDE 4 MCG: 100 INJECTION, SOLUTION INTRAVENOUS at 16:10

## 2024-10-08 RX ADMIN — DEXMEDETOMIDINE HYDROCHLORIDE 4 MCG: 100 INJECTION, SOLUTION INTRAVENOUS at 16:03

## 2024-10-08 NOTE — ANESTHESIA PREPROCEDURE EVALUATION
Anesthesia Evaluation     Patient summary reviewed and Nursing notes reviewed   NPO Solid Status: > 8 hours  NPO Liquid Status: > 8 hours           Airway   Mallampati: I  TM distance: >3 FB  Neck ROM: full  No difficulty expected  Dental - normal exam   (+) edentulous, lower dentures and upper dentures    Pulmonary - normal exam   (+) ,shortness of breath  Cardiovascular - normal exam    (+) hypertension, PVD, DVT      Neuro/Psych  (+) dizziness/light headedness  GI/Hepatic/Renal/Endo    (+) GERD, renal disease-    Musculoskeletal     Abdominal  - normal exam    Bowel sounds: normal.   Substance History      OB/GYN          Other   arthritis,   history of cancer    ROS/Med Hx Other: SR     Impression  Structurally and functionally normal cardiac valves.  Normal left ventricular size and contractility.  Normal echo Doppler study.  Left ventricular ejection fraction is 60%.                      Anesthesia Plan    ASA 3     general     intravenous induction     Anesthetic plan, risks, benefits, and alternatives have been provided, discussed and informed consent has been obtained with: patient.    Plan discussed with CRNA.        CODE STATUS:

## 2024-10-08 NOTE — ANESTHESIA PROCEDURE NOTES
Airway  Urgency: elective    Date/Time: 10/8/2024 3:12 PM    General Information and Staff    Patient location during procedure: OR  CRNA/CAA: Yoselin Chapman CRNA    Indications and Patient Condition  Indications for airway management: airway protection    Preoxygenated: yes  MILS maintained throughout  Mask difficulty assessment: 1 - vent by mask    Final Airway Details  Final airway type: endotracheal airway      Successful airway: ETT  Cuffed: yes   Successful intubation technique: direct laryngoscopy  Facilitating devices/methods: intubating stylet  Endotracheal tube insertion site: oral  Blade: Enedelia  Blade size: 3  ETT size (mm): 7.0  Cormack-Lehane Classification: grade I - full view of glottis  Placement verified by: chest auscultation and capnometry   Measured from: lips  ETT/EBT  to lips (cm): 21  Number of attempts at approach: 1  Assessment: lips, teeth, and gum same as pre-op and atraumatic intubation

## 2024-10-09 NOTE — ANESTHESIA POSTPROCEDURE EVALUATION
Patient: Eleanor Bonds    Procedure Summary       Date: 10/08/24 Room / Location: [facility] OR 08 / [facility] MAIN OR    Anesthesia Start: 1508 Anesthesia Stop: 1659    Procedure: SCALP CYST EXCISION x3 (Bilateral) Diagnosis:       Sebaceous cyst      (Sebaceous cyst [L72.3])    Surgeons: Clarence Shanks MD Provider: Yoselin Chapman CRNA    Anesthesia Type: general ASA Status: 3            Anesthesia Type: general    Vitals  Vitals Value Taken Time   /69 10/08/24 1758   Temp 98.1 °F (36.7 °C) 10/08/24 1655   Pulse 49 10/08/24 1801   Resp 11 10/08/24 1755   SpO2 95 % 10/08/24 1801   Vitals shown include unfiled device data.        Post Anesthesia Care and Evaluation    Patient location during evaluation: PACU  Patient participation: complete - patient participated  Level of consciousness: awake  Pain score: 0  Pain management: adequate  Anesthetic complications: No anesthetic complications  PONV Status: none  Cardiovascular status: acceptable  Respiratory status: acceptable  Hydration status: acceptable

## 2024-10-10 LAB
LAB AP CASE REPORT: NORMAL
PATH REPORT.FINAL DX SPEC: NORMAL
PATH REPORT.GROSS SPEC: NORMAL

## 2024-10-11 ENCOUNTER — TELEPHONE (OUTPATIENT)
Dept: SURGERY | Facility: CLINIC | Age: 49
End: 2024-10-11
Payer: COMMERCIAL

## 2024-10-17 NOTE — OP NOTE
Operative Report:    Patient Name:  Eleanor Bonds  YOB: 1975    Date of Surgery:  10/8/2024     Indications:    48-year-old lady here to see me for multiple sebaceous cysts of her scalp.  She has 2 on the right side 1 on the left side.  These are about 1 cm apiece.  Intermittently cause her pain with pressure when she bumps them.  Are not growing no other subcutaneous lesions.  Discussed options for continued monitoring versus excision in the operating room.  Discussed with benefits and alternatives of surgery including infection, bleeding, injury surrounding structures and patient elected to proceed.     Pre-op Diagnosis:   Sebaceous cyst [L72.3]       Post-Op Diagnosis Codes:     * Sebaceous cyst [L72.3]    Procedure/CPT® Codes:      Procedure(s):  SCALP CYST EXCISION x3    Staff:  Surgeon(s):  Clarence Shanks MD    Circulator: Jennifer Horn RN  Scrub Person: Sharon Kebede  Physician Assistant: Diamond Mc PA  was responsible for performing the following activities: Retraction, Suction, Irrigation, Suturing, Closing, and Placing Dressing and their skilled assistance was necessary for the success of this case.        Anesthesia: General    Estimated Blood Loss: 100 mL    Implants:    Nothing was implanted during the procedure    Specimen:          Specimens       ID Source Type Tests Collected By Collected At Frozen?    A Scalp Tissue TISSUE PATHOLOGY EXAM   Clarence Shanks MD 10/8/24 [id number] No    Description: SCALP CYSTS x3                Findings: 3 scalp cysts each about 2 cm    Complications: None    Description of Procedure:   After risk benefits alternatives were discussed with patient informed sent was obtained.  She was transported to the operating room placed supine on the operating table and underwent general anesthesia.  She was then placed in prone position.  Scalp was partially shaved and her head was prepped and draped in sterile fashion.  Patient had a cyst on the  right side of her head 1 in the middle of the posterior scalp and 1 on the left lateral side.  I made elliptical incisions with 15 blade scalpel over each of the cyst and then remove them with electrocautery.  Cyst were sent to pathology for evaluation.  Irrigated the wounds and used electrocautery to obtain hemostasis.  Skin was reapproximated with interrupted 3-0 Vicryl deep dermal sutures and running 4-0 subcuticular layer.  Incisions were covered with bacitracin and sterile dressing was applied.  Patient was woken from general anesthesia transported recovery unit without incident.      Clarence Shanks MD     Date: 10/17/2024  Time: 08:50 EDT    This note was created using Dragon Voice Recognition software.

## 2024-10-22 ENCOUNTER — OFFICE VISIT (OUTPATIENT)
Dept: SURGERY | Facility: CLINIC | Age: 49
End: 2024-10-22
Payer: COMMERCIAL

## 2024-10-22 VITALS
HEART RATE: 74 BPM | WEIGHT: 167 LBS | HEIGHT: 63 IN | TEMPERATURE: 98.7 F | BODY MASS INDEX: 29.59 KG/M2 | OXYGEN SATURATION: 99 % | SYSTOLIC BLOOD PRESSURE: 116 MMHG | DIASTOLIC BLOOD PRESSURE: 81 MMHG

## 2024-10-22 DIAGNOSIS — L72.3 SEBACEOUS CYST: Primary | ICD-10-CM

## 2024-10-22 PROCEDURE — 99024 POSTOP FOLLOW-UP VISIT: CPT

## 2024-10-22 NOTE — PROGRESS NOTES
"Chief Complaint  Post-op Follow-up (Post op 2 week follow up scalp excision  10-8-24 Dr. Shanks )    Subjective        Eleanor Bonds presents to [facility] GENERAL SURGERY status post scalp excision of cyst x 3 with Dr. Shanks on 10/8/2024.  Doing okay, does report some \"fullness\" in scalp adjacent to right sided incision.  Reports minimal pain.  Tolerating regular diet, no nausea vomiting.  Had regular bowel function.  History of Present Illness    Objective   Vital Signs:  /81 (BP Location: Left arm, Patient Position: Sitting, Cuff Size: Adult)   Pulse 74   Temp 98.7 °F (37.1 °C) (Infrared)   Ht 160 cm (63\")   Wt 75.8 kg (167 lb)   SpO2 99%   BMI 29.58 kg/m²   Estimated body mass index is 29.58 kg/m² as calculated from the following:    Height as of this encounter: 160 cm (63\").    Weight as of this encounter: 75.8 kg (167 lb).            Physical Exam  Constitutional:       General: She is not in acute distress.  HENT:      Head:      Comments: Incisions appear to be healing.  Tenderness to palpation surrounding incisions.  No erythema or ecchymosis noted.  Cardiovascular:      Rate and Rhythm: Normal rate.   Pulmonary:      Effort: Pulmonary effort is normal. No respiratory distress.   Abdominal:      General: There is no distension.      Palpations: Abdomen is soft.      Tenderness: There is no abdominal tenderness. There is no guarding.   Neurological:      Mental Status: She is alert.   Psychiatric:         Mood and Affect: Mood normal.         Behavior: Behavior normal.        Result Review :                Assessment and Plan   Diagnoses and all orders for this visit:    1. Sebaceous cyst (Primary)    status post scalp excision of cyst x 3 with Dr. Shanks on 10/8/2024.  Doing okay, does report some \"fullness\" in scalp adjacent to right sided incision.  Reports minimal pain.  Tolerating regular diet, no nausea vomiting.  Had regular bowel function.  Pathology discussed with " "patient.  Discussed using over-the-counter Tylenol/ibuprofen and ice for residual soreness.  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  Will see in 2 weeks to follow-up on residual pain and \"full\" sensation.         Follow Up   No follow-ups on file.  Patient was given instructions and counseling regarding her condition or for health maintenance advice. Please see specific information pulled into the AVS if appropriate.             "

## 2024-11-04 ENCOUNTER — OFFICE VISIT (OUTPATIENT)
Dept: SURGERY | Facility: CLINIC | Age: 49
End: 2024-11-04
Payer: COMMERCIAL

## 2024-11-04 VITALS
WEIGHT: 170 LBS | HEART RATE: 61 BPM | SYSTOLIC BLOOD PRESSURE: 139 MMHG | TEMPERATURE: 98.2 F | OXYGEN SATURATION: 99 % | HEIGHT: 63 IN | DIASTOLIC BLOOD PRESSURE: 87 MMHG | BODY MASS INDEX: 30.12 KG/M2

## 2024-11-04 DIAGNOSIS — L72.3 SEBACEOUS CYST: Primary | ICD-10-CM

## 2024-11-04 PROCEDURE — 99024 POSTOP FOLLOW-UP VISIT: CPT

## 2024-11-04 NOTE — PROGRESS NOTES
"Chief Complaint  Post-op Follow-up (PO 2 week follow upPO Scalp excision 10/8/24 Dimitris x 3 /)    Subjective        Eleanor Bonds presents to [facility] GENERAL SURGERY status post scalp excision x 3 with Dr. Shanks on 10/8/2024.  Reports improvement in \"full\" sensation.  Denies pain.  Tolerating regular diet without nausea or vomiting.  Having regular bowel function.  History of Present Illness    Objective   Vital Signs:  /87 (BP Location: Right arm, Patient Position: Sitting, Cuff Size: Adult)   Pulse 61   Temp 98.2 °F (36.8 °C) (Infrared)   Ht 160 cm (63\")   Wt 77.1 kg (170 lb)   SpO2 99%   BMI 30.11 kg/m²   Estimated body mass index is 30.11 kg/m² as calculated from the following:    Height as of this encounter: 160 cm (63\").    Weight as of this encounter: 77.1 kg (170 lb).            Physical Exam  Constitutional:       General: She is not in acute distress.  Cardiovascular:      Rate and Rhythm: Normal rate.   Pulmonary:      Effort: Pulmonary effort is normal. No respiratory distress.   Abdominal:      General: There is no distension.      Palpations: Abdomen is soft.      Tenderness: There is no abdominal tenderness. There is no guarding.   Skin:     Comments: Incisions appear healed.   Neurological:      Mental Status: She is alert. Mental status is at baseline.   Psychiatric:         Mood and Affect: Mood normal.         Behavior: Behavior normal.        Result Review :                Assessment and Plan   Diagnoses and all orders for this visit:    1. Sebaceous cyst (Primary)    status post scalp excision x 3 with Dr. Shanks on 10/8/2024.  Reports improvement in \"full\" sensation.  Denies pain.  Tolerating regular diet without nausea or vomiting.  Having regular bowel function.  Overall, appears to be improving.  Can follow-up as needed.  Discussed following up with Dr. Shanks to discuss removal of new cyst on scalp.         Follow Up   No follow-ups on file.  Patient " was given instructions and counseling regarding her condition or for health maintenance advice. Please see specific information pulled into the AVS if appropriate.

## 2024-12-04 NOTE — PROGRESS NOTES
HEMATOLOGY ONCOLOGY OUTPATIENT FOLLOW  UP       Patient name: Eleanor Bonds  : 1975  MRN: [id number]  Primary Care Physician: Lizz Moraes APRN  Referring Physician: Lizz Moraes APRN  Reason For Consult:       History of Present Illness:  Patient is a 48 y.o. female who has been referred to us for further evaluation and management of leukocytosis.  Most recent labs were reviewed as follows    2024:  CBC: 21.59/13.6/41.2/337 [ANC 17.88, lymphocyte count 2.46, monocyte 1K]  CMP is unremarkable.    On review of labs, the patient appears to have chronic neutrophilic leukocytosis going back to , likely beyond that.  There has been an uptick in the WBC count since 2020 until 2022.  With peak WBC count 26,000 in 2022.  Differential count showed neutrophilic predominance with left shift.  Patient reported that she was treated with oral antibiotics for suspected cellulitis of left lower extremity about 2 weeks ago due to noted skin discoloration, however the symptoms have not changed much.    She has reported history of left lower extremity DVT about 20 years ago following her pregnancy for which she was on warfarin for approximately 1 year.  Recent left lower extremity ultrasound on 2024 is suggestive of chronic DVT involving common femoral and proximal femoral veins.  Also noted to have deep venous valvular incompetence in the left popliteal vein.  No acute DVT was noted on the study.    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  As per patient, there are no indication for any surgical intervention at this time.  His CT abdomen pelvis on 2024 showed persistent changes of left iliac vein being compressed against the lumbar spine with the right and left leg arteries along with prominence of the pubic veins.    She reported having history of stage IA1  Cervical cancer for which she is under surveillance with [facility].  She reported undergoing annual CT scan/PET scan for the same.    Smoking: quit 7 years ago, history of smoking 2 PPD prior to that.    She reported being up-to-date with her age-appropriate cancer screening and prior screening colonoscopy.    5/10/24:   Patient presents for initial consultation today.  Pertinent medical history summarized as above.  She denied any signs and symptoms concerning for acute infection [upper respiratory/LRTI GI or  infection].  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    6/5/2024: Pt seen today to follow up on lab results. She reported having symptoms of heartburn, weight loss (around 10lb) and is planned for an EGD later this week for further evaluation.   Reported having chronic hip and knee arthritis, which remains unchanged.    Subjective:  12/5/2024: Patient seen today for 6-month follow-up.  Reports overall feeling well and has no new concerns or complaints today.  Continues to follow with vascular surgery for her chronic DVT/history of May Thurner syndrome.  Continues to follow with [facility] for surveillance.  Is up-to-date on her age-appropriate screenings including colonoscopy, mammogram, lung cancer screenings.    Past Medical History:   Diagnosis Date    Acute embolism and thrombosis of unspecified deep veins of unspecified lower extremity     Arthritis of spine     Cardiomyopathy, unspecified     Cervical cancer     Chronic embolism and thrombosis of left femoral vein     7/28/2021    Chronic embolism and thrombosis of left popliteal vein     7/28/2021    Compression of vein     8/17/2023    GERD (gastroesophageal reflux disease)     High grade squamous intraepithelial lesion on cytologic smear of cervix (HGSIL)     Hypertension     Kidney calculi     Kidney cysts     Localized edema     Low back pain     previous back surgery     May-Thurner syndrome     to be ruled out seeing specialist    Muscle weakness (generalized)     LOWER EXTREMITY    Papillomavirus as the cause of diseases classified elsewhere     Venous insufficiency (chronic) (peripheral)     7/28/2021       Past Surgical History:   Procedure Laterality Date    BACK SURGERY      CHOLECYSTECTOMY N/A 07/11/2022    Procedure: CHOLECYSTECTOMY LAPAROSCOPIC WITH DAVINCI ROBOT;  Surgeon: Clarence Shanks MD;  Location: [facility] MAIN OR;  Service: Robotics - DaVinci;  Laterality: N/A;    COLONOSCOPY N/A 04/28/2023    Procedure: COLONOSCOPY WITH POLYPECTOMY X4;  Surgeon: NICK Montejo MD;  Location: [facility] ENDOSCOPY;  Service: Gastroenterology;  Laterality: N/A;  Post: COLON POLYPS    CYSTOSCOPY W/ URETERAL STENT PLACEMENT Right 08/08/2021    Procedure: CYSTOSCOPY, RIGHT URETEROSCOPY, STONE EXTRACTION, RETROGRADE AND STENT PLACEMENT RIGHT URETER (NO STRING);  Surgeon: Jose Maria Mcelroy MD;  Location: [facility] MAIN OR;  Service: Urology;  Laterality: Right;    HYSTERECTOMY  12/2020    MASS EXCISION Bilateral 10/8/2024    Procedure: SCALP CYST EXCISION x3;  Surgeon: Clarence Shanks MD;  Location: [facility] MAIN OR;  Service: General;  Laterality: Bilateral;    OTHER SURGICAL HISTORY      Stent placement X 3 IN KIDNEY FROM FEBRUARY TO MARCH.         Current Outpatient Medications:     albuterol sulfate  (90 Base) MCG/ACT inhaler, Inhale 2 puffs Every 4 (Four) Hours As Needed for Wheezing., Disp: 6.7 g, Rfl: 0    famotidine (PEPCID) 40 MG tablet, Take 1 tablet by mouth Daily., Disp: , Rfl:     losartan (Cozaar) 50 MG tablet, Take 1 tablet by mouth Daily., Disp: 90 tablet, Rfl: 1    omeprazole (priLOSEC) 40 MG capsule, Take 1 capsule by mouth Daily., Disp: , Rfl:     sucralfate (CARAFATE) 1 g tablet, Take 1 tablet by mouth 4 (Four) Times a Day., Disp: 120 tablet, Rfl: 0    Allergies   Allergen Reactions    Aspirin GI Intolerance       Family History   Problem Relation Age of Onset     "Diabetes Mother     Hyperlipidemia Mother     Hypertension Mother     Arthritis Mother     Heart disease Father     Hyperlipidemia Father     Hypertension Father     COPD Father     Emphysema Father     Cirrhosis Father        Cancer-related family history is not on file.      Social History     Tobacco Use    Smoking status: Former     Current packs/day: 0.00     Average packs/day: 2.0 packs/day for 28.6 years (57.1 ttl pk-yrs)     Types: Cigarettes     Start date:      Quit date: 2015     Years since quittin.3     Passive exposure: Past    Smokeless tobacco: Never    Tobacco comments:     Patient quit smoking on: 2015   Vaping Use    Vaping status: Never Used   Substance Use Topics    Alcohol use: Yes     Comment: occ    Drug use: No     Social History     Social History Narrative    Not on file       ROS:   Review of Systems   Constitutional: Negative.    HENT: Negative.     Eyes: Negative.    Respiratory: Negative.     Cardiovascular: Negative.    Gastrointestinal: Negative.    Genitourinary: Negative.    Musculoskeletal: Negative.    Neurological: Negative.    Hematological: Negative.    Psychiatric/Behavioral: Negative.           Objective:    Vital Signs:  Vitals:    24 0902   BP: 127/85   Pulse: 85   Temp: 98 °F (36.7 °C)   SpO2: 99%   Weight: 77.6 kg (171 lb)   Height: 160 cm (62.99\")         Body mass index is 30.3 kg/m².    ECOG  (0) Fully active, able to carry on all predisease performance without restriction      Physical Exam  Vitals reviewed.   Constitutional:       Appearance: Normal appearance. She is not toxic-appearing.   HENT:      Head: Normocephalic and atraumatic.      Mouth/Throat:      Pharynx: No oropharyngeal exudate.   Eyes:      General: No scleral icterus.     Pupils: Pupils are equal, round, and reactive to light.   Cardiovascular:      Rate and Rhythm: Normal rate and regular rhythm.      Pulses: Normal pulses.      Heart sounds: No murmur heard.  Pulmonary:    " "  Effort: Pulmonary effort is normal.      Breath sounds: Normal breath sounds.   Abdominal:      General: There is no distension.      Palpations: Abdomen is soft. There is no mass.      Tenderness: There is no abdominal tenderness.   Musculoskeletal:         General: Normal range of motion.      Cervical back: Normal range of motion and neck supple.   Skin:     General: Skin is warm.      Coloration: Skin is not jaundiced or pale.      Findings: No bruising, erythema, lesion or rash.   Neurological:      General: No focal deficit present.      Mental Status: She is alert.      Motor: No weakness.   Psychiatric:         Mood and Affect: Mood normal.         Behavior: Behavior normal.         Thought Content: Thought content normal.         Judgment: Judgment normal.         Lab Results - Last 18 Months   Lab Units 12/05/24  0857 05/10/24  1419 04/30/24  1300   WBC 10*3/mm3 12.83* 12.06* 21.59*   HEMOGLOBIN g/dL 14.4 15.0 13.6   HEMATOCRIT % 45.0 46.2 41.2   PLATELETS 10*3/mm3 365 361 337   MCV fL 91.1 90.4 87.3     Lab Results - Last 18 Months   Lab Units 05/10/24  1419 04/30/24  1300   SODIUM mmol/L 140 141   POTASSIUM mmol/L 4.3 3.8   CHLORIDE mmol/L 103 103   CO2 mmol/L 26.0 27.0   BUN mg/dL 8 6   CREATININE mg/dL 0.67 0.73   CALCIUM mg/dL 9.2 9.9   BILIRUBIN mg/dL 0.4 0.4   ALK PHOS U/L 75 79   ALT (SGPT) U/L 15 13   AST (SGOT) U/L 15 14   GLUCOSE mg/dL 103* 115*       Lab Results   Component Value Date    GLUCOSE 103 (H) 05/10/2024    BUN 8 05/10/2024    CREATININE 0.67 05/10/2024    EGFRIFNONA 95 08/09/2021    BCR 11.9 05/10/2024    K 4.3 05/10/2024    CO2 26.0 05/10/2024    CALCIUM 9.2 05/10/2024    ALBUMIN 4.7 05/10/2024    AST 15 05/10/2024    ALT 15 05/10/2024       No results for input(s): \"APTT\", \"INR\", \"PTT\" in the last 07926 hours.    Lab Results   Component Value Date    IRON 59 05/10/2024    TIBC 329 05/10/2024    FERRITIN 236.60 (H) 05/10/2024       Lab Results   Component Value Date    FOLATE " 15.10 05/10/2024     Lab Results   Component Value Date    RETICCTPCT 1.36 05/10/2024     Lab Results   Component Value Date    BSAEIEUI91 315 05/10/2024     Lab Results   Component Value Date    PTT 29.7 07/11/2022    INR 0.97 07/11/2022     Lab Results   Component Value Date    SEDRATE 5 05/10/2024          Assessment & Plan :    Chronic neutrophilic leukocytosis:  -Discussed with patient regarding various differential diagnosis for neutrophilia including chronic infectious/inflammatory/autoimmune disorders, secondary to underlying medical comorbidities, smoking, obesity and likely primary hematologic disorder such as MPN/CML etc.  -Repeat CBC showed improvement in WBC count to 12.0 and mild neutrophilia.  Lymphocyte count is mildly elevated proportionately to WBC count.  -CMP is unremarkable, CRP is mildly elevated.  Iron panel, B12, folate, LDH levels are WNL.  -Flow cytometry, JAK2 mutational analysis, BCR-ABL PCR and KIRBY testing was ordered and ireported negtive.  -Discussed these results with patient, the leukocytosis is likely reactive in  nature. Will continue to monitor.  -Repeat CBC 12/5/2024 is stable      History of DVT:  May Thurner syndrome:  -Recent lower extremity duplex ultrasound suggestive of chronic left lower extremity DVT.  -Prior history of DVT following her pregnancy more than 20 years ago.  Patient is not on anticoagulation presently.  -Given long time having elapsed since her diagnosis of DVT and not being on therapeutic anticoagulation since then and also in the absence of any ongoing symptoms presently, likely not a candidate for anticoagulation in my view.  Will continue to observe.  -Patient is following with vascular surgery for May-Thurner syndrome, no indications for surgical intervention at this time.  -No new symptoms reported today    History of MARIBEL/stage Ia cervical cancer:  Continue surveillance with [facility].    Weight Loss:  Decreased  appetite:  Patient has completed upper endoscopy with gastroenterology.  Will continue to follow  This improved    Follow-up with Dr. Harris in 6 months, repeat CBC at that time.  Sooner if condition indicates    Thank you very much for providing the opportunity to participate in this patient’s care. Please do not hesitate to call if there are any other questions.

## 2024-12-05 ENCOUNTER — OFFICE VISIT (OUTPATIENT)
Dept: ONCOLOGY | Facility: CLINIC | Age: 49
End: 2024-12-05
Payer: COMMERCIAL

## 2024-12-05 ENCOUNTER — LAB (OUTPATIENT)
Dept: LAB | Facility: HOSPITAL | Age: 49
End: 2024-12-05
Payer: COMMERCIAL

## 2024-12-05 VITALS
WEIGHT: 171 LBS | BODY MASS INDEX: 30.3 KG/M2 | DIASTOLIC BLOOD PRESSURE: 85 MMHG | TEMPERATURE: 98 F | HEIGHT: 63 IN | OXYGEN SATURATION: 99 % | SYSTOLIC BLOOD PRESSURE: 127 MMHG | HEART RATE: 85 BPM

## 2024-12-05 DIAGNOSIS — D72.829 LEUKOCYTOSIS, UNSPECIFIED TYPE: Primary | ICD-10-CM

## 2024-12-05 DIAGNOSIS — I82.512 CHRONIC DEEP VEIN THROMBOSIS (DVT) OF FEMORAL VEIN OF LEFT LOWER EXTREMITY: ICD-10-CM

## 2024-12-05 DIAGNOSIS — D72.828 ACQUIRED NEUTROPHILIA: Primary | ICD-10-CM

## 2024-12-05 DIAGNOSIS — N87.9 CERVICAL INTRAEPITHELIAL NEOPLASIA (CIN): ICD-10-CM

## 2024-12-05 DIAGNOSIS — D72.829 LEUKOCYTOSIS, UNSPECIFIED TYPE: ICD-10-CM

## 2024-12-05 LAB
ALBUMIN SERPL-MCNC: 4.4 G/DL (ref 3.5–5.2)
ALBUMIN/GLOB SERPL: 1.5 G/DL
ALP SERPL-CCNC: 85 U/L (ref 39–117)
ALT SERPL W P-5'-P-CCNC: 39 U/L (ref 1–33)
ANION GAP SERPL CALCULATED.3IONS-SCNC: 10.6 MMOL/L (ref 5–15)
AST SERPL-CCNC: 27 U/L (ref 1–32)
BASOPHILS # BLD AUTO: 0.03 10*3/MM3 (ref 0–0.2)
BASOPHILS NFR BLD AUTO: 0.2 % (ref 0–1.5)
BILIRUB SERPL-MCNC: 0.4 MG/DL (ref 0–1.2)
BUN SERPL-MCNC: 14 MG/DL (ref 6–20)
BUN/CREAT SERPL: 16.9 (ref 7–25)
CALCIUM SPEC-SCNC: 9.4 MG/DL (ref 8.6–10.5)
CHLORIDE SERPL-SCNC: 102 MMOL/L (ref 98–107)
CO2 SERPL-SCNC: 27.4 MMOL/L (ref 22–29)
CREAT SERPL-MCNC: 0.83 MG/DL (ref 0.57–1)
CRP SERPL-MCNC: 0.36 MG/DL (ref 0–0.5)
DEPRECATED RDW RBC AUTO: 43.8 FL (ref 37–54)
EGFRCR SERPLBLD CKD-EPI 2021: 87.1 ML/MIN/1.73
EOSINOPHIL # BLD AUTO: 0.33 10*3/MM3 (ref 0–0.4)
EOSINOPHIL NFR BLD AUTO: 2.6 % (ref 0.3–6.2)
ERYTHROCYTE [DISTWIDTH] IN BLOOD BY AUTOMATED COUNT: 13.4 % (ref 12.3–15.4)
ERYTHROCYTE [SEDIMENTATION RATE] IN BLOOD: 2 MM/HR (ref 0–20)
FERRITIN SERPL-MCNC: 191 NG/ML (ref 13–150)
GLOBULIN UR ELPH-MCNC: 3 GM/DL
GLUCOSE SERPL-MCNC: 119 MG/DL (ref 65–99)
HCT VFR BLD AUTO: 45 % (ref 34–46.6)
HGB BLD-MCNC: 14.4 G/DL (ref 12–15.9)
HOLD SPECIMEN: NORMAL
IRON 24H UR-MRATE: 56 MCG/DL (ref 37–145)
IRON SATN MFR SERPL: 16 % (ref 20–50)
LYMPHOCYTES # BLD AUTO: 3.35 10*3/MM3 (ref 0.7–3.1)
LYMPHOCYTES NFR BLD AUTO: 26.1 % (ref 19.6–45.3)
MCH RBC QN AUTO: 29.1 PG (ref 26.6–33)
MCHC RBC AUTO-ENTMCNC: 32 G/DL (ref 31.5–35.7)
MCV RBC AUTO: 91.1 FL (ref 79–97)
MONOCYTES # BLD AUTO: 0.65 10*3/MM3 (ref 0.1–0.9)
MONOCYTES NFR BLD AUTO: 5.1 % (ref 5–12)
NEUTROPHILS NFR BLD AUTO: 66 % (ref 42.7–76)
NEUTROPHILS NFR BLD AUTO: 8.47 10*3/MM3 (ref 1.7–7)
PLATELET # BLD AUTO: 365 10*3/MM3 (ref 140–450)
PMV BLD AUTO: 10.5 FL (ref 6–12)
POTASSIUM SERPL-SCNC: 4 MMOL/L (ref 3.5–5.2)
PROT SERPL-MCNC: 7.4 G/DL (ref 6–8.5)
RBC # BLD AUTO: 4.94 10*6/MM3 (ref 3.77–5.28)
SODIUM SERPL-SCNC: 140 MMOL/L (ref 136–145)
TIBC SERPL-MCNC: 349 MCG/DL (ref 298–536)
TRANSFERRIN SERPL-MCNC: 234 MG/DL (ref 200–360)
WBC NRBC COR # BLD AUTO: 12.83 10*3/MM3 (ref 3.4–10.8)

## 2024-12-05 PROCEDURE — 85025 COMPLETE CBC W/AUTO DIFF WBC: CPT

## 2024-12-05 PROCEDURE — 84466 ASSAY OF TRANSFERRIN: CPT | Performed by: STUDENT IN AN ORGANIZED HEALTH CARE EDUCATION/TRAINING PROGRAM

## 2024-12-05 PROCEDURE — 86140 C-REACTIVE PROTEIN: CPT | Performed by: STUDENT IN AN ORGANIZED HEALTH CARE EDUCATION/TRAINING PROGRAM

## 2024-12-05 PROCEDURE — 82728 ASSAY OF FERRITIN: CPT | Performed by: STUDENT IN AN ORGANIZED HEALTH CARE EDUCATION/TRAINING PROGRAM

## 2024-12-05 PROCEDURE — 80053 COMPREHEN METABOLIC PANEL: CPT | Performed by: STUDENT IN AN ORGANIZED HEALTH CARE EDUCATION/TRAINING PROGRAM

## 2024-12-05 PROCEDURE — 83540 ASSAY OF IRON: CPT | Performed by: STUDENT IN AN ORGANIZED HEALTH CARE EDUCATION/TRAINING PROGRAM

## 2024-12-05 PROCEDURE — 36415 COLL VENOUS BLD VENIPUNCTURE: CPT

## 2024-12-05 PROCEDURE — 85652 RBC SED RATE AUTOMATED: CPT | Performed by: STUDENT IN AN ORGANIZED HEALTH CARE EDUCATION/TRAINING PROGRAM

## 2024-12-05 NOTE — LETTER
December 5, 2024     Patient: Eleanor Bonds   YOB: 1975   Date of Visit: 12/5/2024       To Whom it May Concern:    Eleanor Bonds was seen in my clinic on 12/5/2024.     If you have any questions or concerns, please don't hesitate to call.         Sincerely,          Mita Solomon PA-C        CC: No Recipients

## 2024-12-30 ENCOUNTER — OFFICE VISIT (OUTPATIENT)
Dept: SURGERY | Facility: CLINIC | Age: 49
End: 2024-12-30
Payer: COMMERCIAL

## 2024-12-30 VITALS
HEIGHT: 63 IN | WEIGHT: 176 LBS | TEMPERATURE: 98 F | SYSTOLIC BLOOD PRESSURE: 117 MMHG | HEART RATE: 74 BPM | BODY MASS INDEX: 31.18 KG/M2 | OXYGEN SATURATION: 97 % | DIASTOLIC BLOOD PRESSURE: 78 MMHG

## 2024-12-30 DIAGNOSIS — L72.3 SEBACEOUS CYST: Primary | ICD-10-CM

## 2024-12-30 NOTE — PROGRESS NOTES
"Chief Complaint  Post-op Follow-up (Post op 10-8-24 scalp cyst excision x 3 )    Subjective        Eleanor Bonds presents to [facility] GENERAL SURGERY status post scalp excision x 3 with Dr. Shanks on 10/8/2024.  Following up for incision concerns.  Patient states she still continues to have pain on incision on right temporal region and would like incision to be evaluated.      Objective   Vital Signs:  /78 (BP Location: Right arm, Patient Position: Sitting, Cuff Size: Adult)   Pulse 74   Temp 98 °F (36.7 °C) (Infrared)   Ht 160 cm (63\")   Wt 79.8 kg (176 lb)   SpO2 97%   BMI 31.18 kg/m²   Estimated body mass index is 31.18 kg/m² as calculated from the following:    Height as of this encounter: 160 cm (63\").    Weight as of this encounter: 79.8 kg (176 lb).            Physical Exam  Constitutional:       General: She is not in acute distress.  HENT:      Head:      Comments: Incision x 3.  Right temporal incision scabbed over.  No surrounding erythema, bleeding, or or drainage observed.  Cardiovascular:      Rate and Rhythm: Normal rate.   Pulmonary:      Effort: Pulmonary effort is normal. No respiratory distress.   Abdominal:      General: There is no distension.      Palpations: Abdomen is soft.      Tenderness: There is no abdominal tenderness. There is no guarding.   Neurological:      Mental Status: She is alert. Mental status is at baseline.   Psychiatric:         Mood and Affect: Mood normal.         Behavior: Behavior normal.        Result Review :                Assessment and Plan   Diagnoses and all orders for this visit:    1. Sebaceous cyst (Primary)    status post scalp excision x 3 with Dr. Shanks on 10/8/2024.  Following up for incision concerns.  Patient states she still continues to have pain on incision on right temporal region and would like incision to be evaluated.  Upon examination, appears incision is scabbed over.  Patient does report picking scabs.  " Recommending letting scab fully heal before removing.  Reassured patient that incision and pain will heal with time.         Follow Up   No follow-ups on file.  Patient was given instructions and counseling regarding her condition or for health maintenance advice. Please see specific information pulled into the AVS if appropriate.

## 2025-01-17 ENCOUNTER — APPOINTMENT (OUTPATIENT)
Dept: GENERAL RADIOLOGY | Facility: HOSPITAL | Age: 50
End: 2025-01-17
Payer: COMMERCIAL

## 2025-01-17 ENCOUNTER — HOSPITAL ENCOUNTER (EMERGENCY)
Facility: HOSPITAL | Age: 50
Discharge: HOME | End: 2025-01-17
Attending: EMERGENCY MEDICINE
Payer: COMMERCIAL

## 2025-01-17 VITALS
OXYGEN SATURATION: 100 % | WEIGHT: 176.37 LBS | SYSTOLIC BLOOD PRESSURE: 154 MMHG | RESPIRATION RATE: 17 BRPM | HEIGHT: 63 IN | DIASTOLIC BLOOD PRESSURE: 90 MMHG | TEMPERATURE: 98.3 F | BODY MASS INDEX: 31.25 KG/M2 | HEART RATE: 65 BPM

## 2025-01-17 DIAGNOSIS — S60.051A CONTUSION OF RIGHT LITTLE FINGER WITHOUT DAMAGE TO NAIL, INITIAL ENCOUNTER: Primary | ICD-10-CM

## 2025-01-17 PROCEDURE — 99283 EMERGENCY DEPT VISIT LOW MDM: CPT

## 2025-01-17 PROCEDURE — 73140 X-RAY EXAM OF FINGER(S): CPT

## 2025-01-17 NOTE — ED PROVIDER NOTES
Physical Therapy Treatment    Patient Name:  Lily Green   MRN:  [id number]    Recommendations:     Discharge Recommendations: Low Intensity Therapy  Discharge Equipment Recommendations: none  Barriers to discharge: Decreased caregiver support    Assessment:     Lily Green is a 73 y.o. female admitted with a medical diagnosis of Malignant carcinoid tumor of bronchus.  She presents with the following impairments/functional limitations: weakness, gait instability, impaired balance, impaired functional mobility, impaired cardiopulmonary response to activity . Pt Progressing with PT Intervention. Pt Progressing with improving gait distance. Pt would continue to benefit from skilled PT to address overall functional mobility, goals , and to return to functional baseline.  Goals remain appropriate.     Rehab Prognosis: Good; patient would benefit from acute skilled PT services to address these deficits and reach maximum level of function.    Recent Surgery: Procedure(s) (LRB):  LYMPHADENECTOMY (Right)  THORACOTOMY (Right)  THORACOTOMY, WITH INITIAL THERAPEUTIC WEDGE RESECTION OF LUNG (Right) 2 Days Post-Op    Plan:     During this hospitalization, patient to be seen 4 x/week to address the identified rehab impairments via gait training, therapeutic activities, therapeutic exercises, neuromuscular re-education and progress toward the following goals:    Plan of Care Expires:  02/02/24    Subjective     Chief Complaint: R shoulder pain    Pain/Comfort:  Pain Rating 1: 5/10  Location - Side 1: Right  Location - Orientation 1: lateral  Location 1: shoulder  Pain Addressed 1: Reposition, Distraction, Cessation of Activity      Objective:     Communicated with RN prior to session.  Patient found up in chair with chest tube upon PT entry to room.     General Precautions: Standard, fall  Orthopedic Precautions: N/A  Braces: N/A  Respiratory Status: Room air     Functional Mobility:  Transfers:     Sit to  Subjective   History of Present Illness  Patient 49-year-old female complaint of pain to her right fifth finger after she had fallen yesterday.  Denies other complaint of injury.      Review of Systems    Past Medical History:   Diagnosis Date    Acute embolism and thrombosis of unspecified deep veins of unspecified lower extremity     Arthritis of spine     Cardiomyopathy, unspecified     Cervical cancer     Chronic embolism and thrombosis of left femoral vein     7/28/2021    Chronic embolism and thrombosis of left popliteal vein     7/28/2021    Compression of vein     8/17/2023    GERD (gastroesophageal reflux disease)     High grade squamous intraepithelial lesion on cytologic smear of cervix (HGSIL)     Hypertension     Kidney calculi     Kidney cysts     Localized edema     Low back pain     previous back surgery    May-Thurner syndrome     to be ruled out seeing specialist    Muscle weakness (generalized)     LOWER EXTREMITY    Papillomavirus as the cause of diseases classified elsewhere     Venous insufficiency (chronic) (peripheral)     7/28/2021       Allergies   Allergen Reactions    Aspirin GI Intolerance       Past Surgical History:   Procedure Laterality Date    BACK SURGERY      CHOLECYSTECTOMY N/A 07/11/2022    Procedure: CHOLECYSTECTOMY LAPAROSCOPIC WITH DAVINCI ROBOT;  Surgeon: Clarence Shanks MD;  Location: [facility] MAIN OR;  Service: Robotics - DaVinci;  Laterality: N/A;    COLONOSCOPY N/A 04/28/2023    Procedure: COLONOSCOPY WITH POLYPECTOMY X4;  Surgeon: NICK Montejo MD;  Location: [facility] ENDOSCOPY;  Service: Gastroenterology;  Laterality: N/A;  Post: COLON POLYPS    CYSTOSCOPY W/ URETERAL STENT PLACEMENT Right 08/08/2021    Procedure: CYSTOSCOPY, RIGHT URETEROSCOPY, STONE EXTRACTION, RETROGRADE AND STENT PLACEMENT RIGHT URETER (NO STRING);  Surgeon: Jose Maria Mcelroy MD;  Location: [facility] MAIN OR;  Service: Urology;  Laterality: Right;    HYSTERECTOMY  12/2020    MASS EXCISION Bilateral  10/8/2024    Procedure: SCALP CYST EXCISION x3;  Surgeon: Clarence Shanks MD;  Location: [facility] MAIN OR;  Service: General;  Laterality: Bilateral;    OTHER SURGICAL HISTORY      Stent placement X 3 IN KIDNEY FROM FEBRUARY TO MARCH.       Family History   Problem Relation Age of Onset    Diabetes Mother     Hyperlipidemia Mother     Hypertension Mother     Arthritis Mother     Heart disease Father     Hyperlipidemia Father     Hypertension Father     COPD Father     Emphysema Father     Cirrhosis Father        Social History     Socioeconomic History    Marital status:    Tobacco Use    Smoking status: Former     Current packs/day: 0.00     Average packs/day: 2.0 packs/day for 28.6 years (57.1 ttl pk-yrs)     Types: Cigarettes     Start date:      Quit date: 2015     Years since quittin.4     Passive exposure: Past    Smokeless tobacco: Never    Tobacco comments:     Patient quit smoking on: 2015   Vaping Use    Vaping status: Never Used   Substance and Sexual Activity    Alcohol use: Yes     Comment: occ    Drug use: No    Sexual activity: Defer           Objective   Physical Exam  Extremities Amcill's patient plan of progression of right fifth finger.  She has no swelling ecchymosis or deformity.  She has neurovasc intact  Procedures           ED Course      XR Finger 2+ View Right    Result Date: 2025  Impression: Mild degenerative change of the right fifth finger. No acute osseous abnormality. Electronically Signed: Judy Reid MD  2025 9:16 AM EST  Workstation ID: [id number]                                                    Medical Decision Making  My interpretation patient's right fifth finger x-ray shows no fracture or dislocation.  Patient be discharged.  Will place on Naprosyn.  She is ice elevate see her MD for recheck as needed.    Amount and/or Complexity of Data Reviewed  Radiology: ordered and independent interpretation performed.    Risk  Prescription drug  Stand:  minimum assistance with hand-held assist  Gait: 15ft with no AD with minimal assist. Pt with posterior and lateral instability during gait. Pt then amb with HHA CGA x 350 ft with no LOB          AM-PAC 6 CLICK MOBILITY  Turning over in bed (including adjusting bedclothes, sheets and blankets)?: 3  Sitting down on and standing up from a chair with arms (e.g., wheelchair, bedside commode, etc.): 3  Moving from lying on back to sitting on the side of the bed?: 3  Moving to and from a bed to a chair (including a wheelchair)?: 3  Need to walk in hospital room?: 3  Climbing 3-5 steps with a railing?: 2  Basic Mobility Total Score: 17       Treatment & Education:  Therapist provided instruction and educated of  patient on progress, safety,d/c,PT POC,   proper body mechanics, energy conservation, and fall prevention strategies during tasks listed above, on the effects of prolonged immobility and the importance of performing OOB activity and exercises to promote healing and reduce recovery time     Updated white board with appropriate PT mobility information for medical team notification   Donned an extra gown   Pt safe to ambulate in hallway with RN or PCT assistance  Call nursing/pct to transfer to chair/use bathroom. Pt stated understanding  Patient  facilitated therex X 15 reps seated in bedside chair B LE AROM AP, LAQ, Hip Flexion, Hip Abd/Add. Patient required skilled PTA for instruction of exercises and appropriate cues to perform exercises safely, sequencing and appropriately.   Exercises performed to develop and maintain pt's strength, endurance and flexibility.   Bedside table in front of patient and area set up for function, convenience, and safety. RN aware of patient's mobility needs and status. Questions/concerns addressed within PTA scope of practice; patient  with no further questions. Time was provided for active listening, discussion of health disposition, and discussion of safe discharge.  Pt?verbalized?agreement .    Patient left up in chair with all lines intact, call button in reach, and nsg notified..    GOALS:   Multidisciplinary Problems       Physical Therapy Goals          Problem: Physical Therapy    Goal Priority Disciplines Outcome Goal Variances Interventions   Physical Therapy Goal     PT, PT/OT Ongoing, Progressing     Description: PT goals until 1/10/24    1. Pt supine to sit with supervision-not met  2. Pt sit to supine with supervision-not met  3. Pt sit to stand with no AD with supervision-not met  4. Pt to perform gait 300ft with no AD with supervision.-not met  5. Pt to go up/down curb step with no AD with SBA.-not met                         Time Tracking:     PT Received On: 01/04/24  PT Start Time: 0816     PT Stop Time: 0840  PT Total Time (min): 24 min     Billable Minutes: Gait Training 15 and Therapeutic Exercise 9    Treatment Type: Treatment  PT/PTA: PTA     Number of PTA visits since last PT visit: 1 01/04/2024   management.        Final diagnoses:   Contusion of right little finger without damage to nail, initial encounter       ED Disposition  ED Disposition       ED Disposition   Discharge    Condition   Stable    Comment   --               No follow-up provider specified.       Medication List        New Prescriptions      naproxen 375 MG tablet  Commonly known as: NAPROSYN  Take 1 tablet by mouth 2 (Two) Times a Day As Needed for Moderate Pain.               Where to Get Your Medications        Information about where to get these medications is not yet available    Ask your nurse or doctor about these medications  naproxen 375 MG tablet            Ron Romero MD  01/17/25 1007

## 2025-02-04 DIAGNOSIS — I10 HYPERTENSION, UNSPECIFIED TYPE: ICD-10-CM

## 2025-02-05 RX ORDER — LOSARTAN POTASSIUM 50 MG/1
50 TABLET ORAL DAILY
Qty: 90 TABLET | Refills: 1 | Status: SHIPPED | OUTPATIENT
Start: 2025-02-05

## 2025-02-28 ENCOUNTER — OFFICE VISIT (OUTPATIENT)
Dept: FAMILY MEDICINE CLINIC | Facility: CLINIC | Age: 50
End: 2025-02-28
Payer: COMMERCIAL

## 2025-02-28 VITALS
HEART RATE: 62 BPM | OXYGEN SATURATION: 98 % | DIASTOLIC BLOOD PRESSURE: 83 MMHG | WEIGHT: 174 LBS | SYSTOLIC BLOOD PRESSURE: 129 MMHG | HEIGHT: 63 IN | TEMPERATURE: 97.3 F | BODY MASS INDEX: 30.83 KG/M2

## 2025-02-28 DIAGNOSIS — K21.9 GASTROESOPHAGEAL REFLUX DISEASE, UNSPECIFIED WHETHER ESOPHAGITIS PRESENT: ICD-10-CM

## 2025-02-28 DIAGNOSIS — I87.1 MAY-THURNER SYNDROME: ICD-10-CM

## 2025-02-28 DIAGNOSIS — L98.9 SKIN LESION: ICD-10-CM

## 2025-02-28 DIAGNOSIS — R06.02 SHORTNESS OF BREATH: ICD-10-CM

## 2025-02-28 DIAGNOSIS — I10 HYPERTENSION, UNSPECIFIED TYPE: Primary | ICD-10-CM

## 2025-02-28 PROCEDURE — 99214 OFFICE O/P EST MOD 30 MIN: CPT | Performed by: NURSE PRACTITIONER

## 2025-02-28 RX ORDER — ALBUTEROL SULFATE 90 UG/1
2 INHALANT RESPIRATORY (INHALATION) EVERY 4 HOURS PRN
Qty: 6.7 G | Refills: 2 | Status: SHIPPED | OUTPATIENT
Start: 2025-02-28

## 2025-02-28 NOTE — PROGRESS NOTES
Subjective     Eleanor Bonds is a 49 y.o. female.     History of Present Illness  Patient is here today to follow-up on chronic medical conditions.  Works in a factory  She is   She has 4 children  She does not smoke  Drinks on rare occasion  She stays active with work and with her grandchilldren  Eats a well balanced diet- cut out sodas     Hypertension-patient is currently on losartan 50 mg daily. She is doing well on the med. Denies CP, dizziness, HA. SOA with exertion     GERD-patient is currently on Prilosec 40 mg daily, pepcid 40mg daily, and carafate. She saw GI. Had an EGD on 7/7/24and they stretched her esophagus. She is doing ok on meds.      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  She has seen hematology and vascular for this.  Vascular wants to do conservative measures and monitor at this time.  She is supposed to be wearing compression stockings.  Hematology does not think she needs anticoagulation at this time and we will see her biannually. She has some chronic hardening of the left leg.      Cervical cancer-patient was diagnosed in 2023 and sees Dr. Starkey.  She had a partial hysterectomy.  She did not have to do radiation or chemo. She sees him yearly.      Labs- UTD  Pap smear- UTD 10/2024  Mammogram- 9/5/24  Colonoscopy- 4/28/23     Vaccines:  Flu- refused  PNA-  Shingles-  Tdap- due  Covid-19-     Dental exam-  Eye exam-            The following portions of the patient's history were reviewed and updated as appropriate: allergies, current medications, past family history, past medical history, past social history, past surgical history, and problem list.    Review of Systems   Constitutional:  Negative for chills, fatigue and fever.   Respiratory:  Positive for shortness of breath. Negative for chest tightness.    Cardiovascular:  Negative for chest pain and palpitations.   Skin:  Positive for skin lesions (left pinky lesion).   Neurological:  Negative for  "dizziness and headache.   Psychiatric/Behavioral:  Negative for depressed mood and stress. The patient is not nervous/anxious.        Objective     /83 (BP Location: Left arm, Patient Position: Sitting, Cuff Size: Large Adult)   Pulse 62   Temp 97.3 °F (36.3 °C) (Temporal)   Ht 160 cm (62.99\")   Wt 78.9 kg (174 lb)   LMP 03/03/2020   SpO2 98%   BMI 30.83 kg/m²     Current Outpatient Medications on File Prior to Visit   Medication Sig Dispense Refill    famotidine (PEPCID) 40 MG tablet Take 1 tablet by mouth Daily.      losartan (Cozaar) 50 MG tablet Take 1 tablet by mouth Daily. 90 tablet 1    naproxen (NAPROSYN) 375 MG tablet Take 1 tablet by mouth 2 (Two) Times a Day As Needed for Moderate Pain. 14 tablet 0    omeprazole (priLOSEC) 40 MG capsule Take 1 capsule by mouth Daily.      sucralfate (CARAFATE) 1 g tablet Take 1 tablet by mouth 4 (Four) Times a Day. 120 tablet 0    [DISCONTINUED] albuterol sulfate  (90 Base) MCG/ACT inhaler Inhale 2 puffs Every 4 (Four) Hours As Needed for Wheezing. 6.7 g 0     No current facility-administered medications on file prior to visit.                 Physical Exam  Constitutional:       General: She is not in acute distress.     Appearance: Normal appearance. She is not ill-appearing.   HENT:      Head: Normocephalic and atraumatic.   Eyes:      Extraocular Movements: Extraocular movements intact.   Cardiovascular:      Rate and Rhythm: Normal rate and regular rhythm.      Heart sounds: No murmur heard.  Pulmonary:      Effort: Pulmonary effort is normal. No respiratory distress.   Skin:     Findings: Lesion (mass on left pinky finger) present.   Neurological:      General: No focal deficit present.      Mental Status: She is alert and oriented to person, place, and time.   Psychiatric:         Mood and Affect: Mood normal.         Behavior: Behavior normal.         Thought Content: Thought content normal.         Judgment: Judgment normal. "           Assessment & Plan     Diagnoses and all orders for this visit:    1. 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 Gastroesophageal reflux disease, unspecified whether esophagitis present  Comments:  stable  cont omeprazole, pepcid, and carafate    4. Shortness of breath  Comments:  prenew albuterol  Orders:  -     albuterol sulfate  (90 Base) MCG/ACT inhaler; Inhale 2 puffs Every 4 (Four) Hours As Needed for Wheezing.  Dispense: 6.7 g; Refill: 2    5. Skin lesion  Comments:  left pinky finger  poss cyst vs wart  referral to derm  Orders:  -     Ambulatory Referral to Dermatology

## 2025-03-03 ENCOUNTER — TELEPHONE (OUTPATIENT)
Dept: FAMILY MEDICINE CLINIC | Facility: CLINIC | Age: 50
End: 2025-03-03
Payer: COMMERCIAL

## 2025-03-03 NOTE — TELEPHONE ENCOUNTER
Caller: Eleanor Bonds    Relationship: Self    Best call back number: [phone]     What form or medical record are you requesting: EXCUSE FOR WORK FOR APPOINTMENT WITH PCP 2/28/25    Who is requesting this form or medical record from you: EMPLOYER    How would you like to receive the form or medical records (pick-up, mail, fax):   If fax, what is the fax number: [phone] LEROY CASTELLANOS      Timeframe paperwork needed: ASAP

## 2025-05-20 ENCOUNTER — OFFICE VISIT (OUTPATIENT)
Dept: FAMILY MEDICINE CLINIC | Facility: CLINIC | Age: 50
End: 2025-05-20
Payer: COMMERCIAL

## 2025-05-20 VITALS
WEIGHT: 171 LBS | BODY MASS INDEX: 30.29 KG/M2 | OXYGEN SATURATION: 100 % | HEART RATE: 67 BPM | SYSTOLIC BLOOD PRESSURE: 138 MMHG | DIASTOLIC BLOOD PRESSURE: 84 MMHG | TEMPERATURE: 98.7 F

## 2025-05-20 DIAGNOSIS — I10 HYPERTENSION, UNSPECIFIED TYPE: ICD-10-CM

## 2025-05-20 DIAGNOSIS — L50.9 HIVES: Primary | ICD-10-CM

## 2025-05-20 DIAGNOSIS — D23.5 DERMOID CYST OF SKIN OF BACK: ICD-10-CM

## 2025-05-20 PROCEDURE — 99214 OFFICE O/P EST MOD 30 MIN: CPT | Performed by: NURSE PRACTITIONER

## 2025-05-20 RX ORDER — LOSARTAN POTASSIUM 50 MG/1
50 TABLET ORAL DAILY
Qty: 90 TABLET | Refills: 1 | Status: SHIPPED | OUTPATIENT
Start: 2025-05-20

## 2025-05-20 NOTE — PROGRESS NOTES
Subjective     Eleanor Bonds is a 49 y.o. female.     History of Present Illness  Pt is here today with c/o hives  She reports she had hives last Tues  She was under a lot of stress  Slept with her window open with a fan  Hives were all over her body, including her face.  [place] [facility] [place] and zyrtec  She was also taking pepcid.  She had them for a few days and then they went away  She never had SOA.    Pt states that the cyst on her left upper back is back again  Denies redness or drainage  It is uncomfortable when she lays on it  She will see derm soon       The following portions of the patient's history were reviewed and updated as appropriate: allergies, current medications, past family history, past medical history, past social history, past surgical history, and problem list.    Review of Systems   Constitutional:  Negative for chills, fatigue and fever.   Respiratory:  Negative for chest tightness and shortness of breath.    Cardiovascular:  Negative for chest pain and palpitations.   Skin:  Positive for rash and skin lesions.   Neurological:  Negative for dizziness and headache.       Objective     /84   Pulse 67   Temp 98.7 °F (37.1 °C) (Tympanic)   Wt 77.6 kg (171 lb)   LMP 03/03/2020   SpO2 100%   BMI 30.29 kg/m²     Current Outpatient Medications on File Prior to Visit   Medication Sig Dispense Refill    albuterol sulfate  (90 Base) MCG/ACT inhaler Inhale 2 puffs Every 4 (Four) Hours As Needed for Wheezing. 6.7 g 2    famotidine (PEPCID) 40 MG tablet Take 1 tablet by mouth Daily.      naproxen (NAPROSYN) 375 MG tablet Take 1 tablet by mouth 2 (Two) Times a Day As Needed for Moderate Pain. 14 tablet 0    omeprazole (priLOSEC) 40 MG capsule Take 1 capsule by mouth Daily.      [DISCONTINUED] losartan (Cozaar) 50 MG tablet Take 1 tablet by mouth Daily. 90 tablet 1     No current facility-administered medications on file prior to visit.                  Physical Exam  Constitutional:       General: She is not in acute distress.     Appearance: Normal appearance. She is not ill-appearing.   HENT:      Head: Normocephalic and atraumatic.   Eyes:      Extraocular Movements: Extraocular movements intact.   Pulmonary:      Effort: Pulmonary effort is normal. No respiratory distress.   Skin:     Findings: No rash.             Comments: Firm cyst on left upper back. No surrounding erythema. Large amount of cystic substance removed   Neurological:      General: No focal deficit present.      Mental Status: She is alert and oriented to person, place, and time.   Psychiatric:         Mood and Affect: Mood normal.         Behavior: Behavior normal.         Thought Content: Thought content normal.         Judgment: Judgment normal.           Assessment & Plan     Diagnoses and all orders for this visit:    1. Hives (Primary)  Comments:  resolved  likely environmental  take zyrtec daily    2. Hypertension, unspecified type  Comments:  stable  cont losartan    3. Dermoid cyst of skin of back  Comments:  large amount of cystic material removed  doesnt appear infected  will see derm to remove capsule    Other orders  -     losartan (Cozaar) 50 MG tablet; Take 1 tablet by mouth Daily.  Dispense: 90 tablet; Refill: 1

## 2025-07-09 PROBLEM — E66.811 OBESITY (BMI 30.0-34.9): Status: ACTIVE | Noted: 2025-07-09

## 2025-07-09 NOTE — PROGRESS NOTES
"[facility] VASCULAR SURGERY    Chief Complaint  Deep Vein Thrombosis    Subjective          History of Present Illness  Eleanor Bonds is a 49 y.o. female with history of a left leg DVT who now has a chronically occluded left iliac vein. She presents to the office today for follow up. She is followed by Dr. Ac. She has not had any previous vascular interventions performed. She denies any hospitalizations or new diagnosis since we last saw her.  She reports continued swelling to her left lower extremity.  This is not severe or lifestyle limiting.  It occurs mainly if she is on her feet too much during the day.  She does not wear compression stockings as recommended.  She has thigh-high compression stockings at home, she will wear them occasionally during the wintertime.  She does not wear them at all during the summertime as it is \"too hot.\"  She denies any pain or wounds to her lower extremities.  She does have an area along the posterior left calf that is chronically discolored and skin is thick/hardened.  She reports some numbness along her left hip.  She is not on chronic anticoagulation.  She was treated for about 6 months with warfarin in the acute phase of her DVT. She is a former smoker.  Quit smoking about 10 years ago.    Review of Systems   Cardiovascular:  Positive for leg swelling (occasional).   Musculoskeletal:  Negative for myalgias.   Skin:  Negative for skin lesions and wound.        Allergies: Aspirin    Prior to Admission medications    Medication Sig Start Date End Date Taking? Authorizing Provider   albuterol sulfate  (90 Base) MCG/ACT inhaler Inhale 2 puffs Every 4 (Four) Hours As Needed for Wheezing. 2/28/25   Lizz Moraes APRN   famotidine (PEPCID) 40 MG tablet Take 1 tablet by mouth Daily. 6/7/24   Provider, MD Lori   losartan (Cozaar) 50 MG tablet Take 1 tablet by mouth Daily. 5/20/25   Lizz Moraes APRN   naproxen (NAPROSYN) 375 MG tablet " "Take 1 tablet by mouth 2 (Two) Times a Day As Needed for Moderate Pain. 1/17/25   Ron Romero MD   omeprazole (priLOSEC) 40 MG capsule Take 1 capsule by mouth Daily.    Provider, MD Lori         Objective   Vital Signs:  /83 (BP Location: Left arm)   Ht 160 cm (62.99\")   Wt 79.1 kg (174 lb 6.4 oz)   BMI 30.90 kg/m²   Estimated body mass index is 30.9 kg/m² as calculated from the following:    Height as of this encounter: 160 cm (62.99\").    Weight as of this encounter: 79.1 kg (174 lb 6.4 oz).       Physical Exam  Vitals reviewed.   Constitutional:       General: She is not in acute distress.     Appearance: She is not ill-appearing.   Cardiovascular:      Pulses:           Radial pulses are 2+ on the right side and 2+ on the left side.        Dorsalis pedis pulses are 2+ on the right side and 2+ on the left side.        Posterior tibial pulses are 2+ on the right side and 2+ on the left side.   Pulmonary:      Effort: Pulmonary effort is normal. No respiratory distress.   Skin:         Neurological:      General: No focal deficit present.      Mental Status: She is alert and oriented to person, place, and time.        Result Review :    The following data was reviewed by: HOLLEY White on 07/10/2025:  I reviewed the office note by Dr. Jef Ventura from 6/17/2025     Progress Notes by Lizz Moraes APRN (05/20/2025 08:30)        Assessment and Plan     Diagnoses and all orders for this visit:    1. Acquired occlusion of iliac vein (Primary)    2. Chronic deep vein thrombosis (DVT) of iliac vein of left lower extremity    3. Obesity (BMI 30.0-34.9)    BMI is >= 30 and <35. (Class 1 Obesity). The following options were offered after discussion;: Information on healthy weight added to patient's after visit summary.         Eleanor Bonds is a 49 y.o. female with history of previous DVT, likely previous May-Thurner syndrome now with completely occluded left common iliac vein " that is chronically occluded based on CT scan performed almost 2 years ago.  She has chronic left leg swelling that comes and goes depending on her activity level.  She has no ulcerations to her lower extremities.  She is not compliant with compression stockings use.  She does elevate her legs at times.  We discussed the importance of compression stocking use in the setting of chronic DVT and post thrombotic syndrome.  She has thigh-high compression stockings at home which are bothersome for her to wear and not comfortable.  We discussed any type of compression would be helpful and she may be more comfortable in a knee-high compression stocking.  I gave her a prescription for 20 to 30 mmHg knee-high compression stockings that she should wear daily.  We discussed putting stockings on in the morning and taking them off at night.  She should replace her stockings every 3 to 6 months. Recommend elevating her legs at times where she knows she will be in a dependent position for a long period.  She verbalized understanding of this.  We had a discussion about her plan of care and follow-up, we came to the decision that she will follow-up with me as needed. She was instructed to call our office if she had any questions or concerns.     Follow Up     Return if symptoms worsen or fail to improve.    Patient was given instructions and counseling regarding her condition or for health maintenance advice. Please see specific information pulled into the AVS if appropriate.     HOLLEY White

## 2025-07-10 ENCOUNTER — OFFICE VISIT (OUTPATIENT)
Age: 50
End: 2025-07-10
Payer: COMMERCIAL

## 2025-07-10 VITALS
DIASTOLIC BLOOD PRESSURE: 83 MMHG | WEIGHT: 174.4 LBS | BODY MASS INDEX: 30.9 KG/M2 | HEIGHT: 63 IN | SYSTOLIC BLOOD PRESSURE: 135 MMHG

## 2025-07-10 DIAGNOSIS — I82.522 CHRONIC DEEP VEIN THROMBOSIS (DVT) OF ILIAC VEIN OF LEFT LOWER EXTREMITY: ICD-10-CM

## 2025-07-10 DIAGNOSIS — I82.429: Primary | ICD-10-CM

## 2025-07-10 DIAGNOSIS — E66.811 OBESITY (BMI 30.0-34.9): ICD-10-CM

## 2025-07-10 PROCEDURE — 99214 OFFICE O/P EST MOD 30 MIN: CPT | Performed by: NURSE PRACTITIONER

## 2025-08-06 RX ORDER — LOSARTAN POTASSIUM 50 MG/1
50 TABLET ORAL DAILY
Qty: 90 TABLET | Refills: 1 | Status: SHIPPED | OUTPATIENT
Start: 2025-08-06

## 2025-08-28 ENCOUNTER — OFFICE VISIT (OUTPATIENT)
Dept: FAMILY MEDICINE CLINIC | Facility: CLINIC | Age: 50
End: 2025-08-28
Payer: COMMERCIAL

## 2025-08-28 VITALS
SYSTOLIC BLOOD PRESSURE: 127 MMHG | OXYGEN SATURATION: 98 % | BODY MASS INDEX: 30.48 KG/M2 | DIASTOLIC BLOOD PRESSURE: 81 MMHG | HEART RATE: 68 BPM | TEMPERATURE: 97.7 F | WEIGHT: 172 LBS

## 2025-08-28 DIAGNOSIS — I87.1 MAY-THURNER SYNDROME: ICD-10-CM

## 2025-08-28 DIAGNOSIS — K21.9 GASTROESOPHAGEAL REFLUX DISEASE, UNSPECIFIED WHETHER ESOPHAGITIS PRESENT: ICD-10-CM

## 2025-08-28 DIAGNOSIS — F32.A DEPRESSION, UNSPECIFIED DEPRESSION TYPE: ICD-10-CM

## 2025-08-28 DIAGNOSIS — I10 HYPERTENSION, UNSPECIFIED TYPE: Primary | ICD-10-CM

## 2025-08-28 PROCEDURE — 99214 OFFICE O/P EST MOD 30 MIN: CPT | Performed by: NURSE PRACTITIONER

## 2025-08-28 RX ORDER — SERTRALINE HYDROCHLORIDE 25 MG/1
25 TABLET, FILM COATED ORAL DAILY
Qty: 30 TABLET | Refills: 0 | Status: SHIPPED | OUTPATIENT
Start: 2025-08-28

## (undated) DEVICE — SOL IRRIG NACL 1000ML

## (undated) DEVICE — SUT VIC 3/0 CT2 27IN J232H

## (undated) DEVICE — REDUCER: Brand: ENDOWRIST

## (undated) DEVICE — SOLUTION,WATER,IRRIGATION,1000ML,STERILE: Brand: MEDLINE

## (undated) DEVICE — SYS IRR PUMP SGL ACTN VAC SYR 10CC

## (undated) DEVICE — ENDOPATH XCEL WITH OPTIVIEW TECHNOLOGY BLADELESS TROCARS WITH STABILITY SLEEVES: Brand: ENDOPATH XCEL OPTIVIEW

## (undated) DEVICE — CFF SCD HEMFRCE SEQ CLF STD XL

## (undated) DEVICE — DECANTER: Brand: UNBRANDED

## (undated) DEVICE — GLV SURG BIOGEL LTX PF 7

## (undated) DEVICE — ENDOPATH XCEL BLADELESS TROCARS WITH STABILITY SLEEVES: Brand: ENDOPATH XCEL

## (undated) DEVICE — SYR LUERLOK 30CC

## (undated) DEVICE — 3M™ STERI-STRIP™ REINFORCED ADHESIVE SKIN CLOSURES, R1547, 1/2 IN X 4 IN (12 MM X 100 MM), 6 STRIPS/ENVELOPE: Brand: 3M™ STERI-STRIP™

## (undated) DEVICE — DUAL LUMEN URETERAL CATHETER

## (undated) DEVICE — PK CYSTO 50

## (undated) DEVICE — ANTIBACTERIAL UNDYED BRAIDED (POLYGLACTIN 910), SYNTHETIC ABSORBABLE SUTURE: Brand: COATED VICRYL

## (undated) DEVICE — ARM DRAPE

## (undated) DEVICE — GW AMPLTZ SUPRSTF PTFE JB .035 7X145CM

## (undated) DEVICE — PENCL SMOKE/EVAC MEGADYNE TELESCP 15FT

## (undated) DEVICE — TRAP WIDEEYE POLYP

## (undated) DEVICE — SLV SCD CALF HEMOFORCE DVT THERP REPROC MD

## (undated) DEVICE — BG RETRV TISS SUPERBAG INTRO RIP/STOP NLY 10MM 240ML MD

## (undated) DEVICE — CATH URETRL OPN/END 5F70CM

## (undated) DEVICE — SYR LL TP 10ML STRL

## (undated) DEVICE — NDL HYPO PRECISIONGLIDE REG 22G 1 1/2

## (undated) DEVICE — PK ENDO GI 50

## (undated) DEVICE — SOL IRRG H2O BG 3000ML STRL

## (undated) DEVICE — 2, DISPOSABLE SUCTION/IRRIGATOR WITH DISPOSABLE TIP: Brand: STRYKEFLOW

## (undated) DEVICE — UNDERGLV SURG BIOGEL INDICATOR LF PF 7.5

## (undated) DEVICE — KT SURG TURNOVER 050

## (undated) DEVICE — UNDYED BRAIDED (POLYGLACTIN 910), SYNTHETIC ABSORBABLE SUTURE: Brand: COATED VICRYL

## (undated) DEVICE — LAPAROSCOPIC GAS CONDITIONING DEVICE.: Brand: INSUFLOW

## (undated) DEVICE — SEAL

## (undated) DEVICE — GLV SURG SIGNATURE ESSENTIAL PF LTX SZ7

## (undated) DEVICE — DRSNG WND GZ PAD BORDERED 4X8IN STRL

## (undated) DEVICE — SYR LUERLOK 20CC BX/50

## (undated) DEVICE — SUT VIC 0/0 UR6 27IN DYED J603H

## (undated) DEVICE — COLUMN DRAPE

## (undated) DEVICE — BLADELESS OBTURATOR: Brand: WECK VISTA

## (undated) DEVICE — SNAR POLYP HOTSNARE/BRAIDED OVL/MINI 7F 2.8X10MM 230CM 1P/U

## (undated) DEVICE — ENDOPOUCH RETRIEVER SPECIMEN RETRIEVAL BAGS: Brand: ENDOPOUCH RETRIEVER

## (undated) DEVICE — 3M™ UNIVERSAL ELECTROSURGICAL PAD, SPLIT, WITH 15 FT CORD 9165L: Brand: 3M™

## (undated) DEVICE — NITINOL STONE RETRIEVAL BASKET: Brand: ZERO TIP

## (undated) DEVICE — BLANKT WARM UPPR/BDY ARM/OUT 57X196CM

## (undated) DEVICE — CANNULA SEAL

## (undated) DEVICE — NDL HYPO PRECISIONGLIDE/REG 25G 5/8 BLU

## (undated) DEVICE — KENDALL SCD EXPRESS FOOT CUFF, MEDIUM: Brand: KENDALL SCD

## (undated) DEVICE — ADHS SKIN PREMIERPRO EXOFIN TOPICAL HI/VISC .5ML

## (undated) DEVICE — PASS SUT PRO BARIATRIC XL W/TROC SWABS

## (undated) DEVICE — GENERAL LAPAROSCOPY CDS: Brand: MEDLINE INDUSTRIES, INC.

## (undated) DEVICE — PK MINOR LAPAROTOMY 50

## (undated) DEVICE — VISUALIZATION SYSTEM: Brand: CLEARIFY

## (undated) DEVICE — SYR ANGIO FNGR FIX CONTRL MLL 10ML